# Patient Record
Sex: FEMALE | Race: WHITE | Employment: OTHER | URBAN - METROPOLITAN AREA
[De-identification: names, ages, dates, MRNs, and addresses within clinical notes are randomized per-mention and may not be internally consistent; named-entity substitution may affect disease eponyms.]

---

## 2019-07-03 ASSESSMENT — ENCOUNTER SYMPTOMS
SORE THROAT: 0
CONSTIPATION: 0
DIARRHEA: 0
BLOOD IN STOOL: 0
SINUS PAIN: 0
COUGH: 0
NAUSEA: 0
BACK PAIN: 0
ABDOMINAL PAIN: 0
WHEEZING: 0
SHORTNESS OF BREATH: 0
VOMITING: 0

## 2019-07-03 NOTE — PROGRESS NOTES
Constitutional: She is oriented to person, place, and time. She appears well-developed and well-nourished. No distress. HENT:   Head: Normocephalic and atraumatic. Right Ear: External ear normal.   Left Ear: External ear normal.   Mouth/Throat: Oropharynx is clear and moist. No oropharyngeal exudate. Eyes: Pupils are equal, round, and reactive to light. Conjunctivae are normal.   Neck: Normal range of motion. Neck supple. No thyromegaly present. Cardiovascular: Normal rate, regular rhythm, normal heart sounds and intact distal pulses. Exam reveals no gallop and no friction rub. No murmur heard. Pulmonary/Chest: Effort normal and breath sounds normal. No respiratory distress. She has no wheezes. She has no rales. She exhibits no tenderness. Abdominal: Soft. Bowel sounds are normal. She exhibits no distension. There is no tenderness. There is no guarding. Musculoskeletal: Normal range of motion. She exhibits no edema, tenderness or deformity. Lymphadenopathy:     She has no cervical adenopathy. Neurological: She is alert and oriented to person, place, and time. Coordination normal.   Skin: Skin is warm and dry. No rash noted. She is not diaphoretic. No erythema. No pallor. Psychiatric: She has a normal mood and affect. Her behavior is normal. Judgment and thought content normal.   Vitals reviewed. Assessment and Plan:  Avery Melo was seen today for establish care. Diagnoses and all orders for this visit:    Encounter to establish care with new doctor    Anxiety and depression/PTSD (post-traumatic stress disorder)   - The patient is very poor historian. She is unsure of what medications she is taking and what doses she is taking. She did not bring her medications with her. She is unsure of the dose of Zoloft at this time.   - PHQ9 today is 3.   - The patient was seeing a counselor in her old city and she would like to establish with one today.    - Referral to psychology    Type 2 diabetes

## 2019-07-10 ENCOUNTER — OFFICE VISIT (OUTPATIENT)
Dept: INTERNAL MEDICINE CLINIC | Age: 64
End: 2019-07-10
Payer: MEDICAID

## 2019-07-10 VITALS
BODY MASS INDEX: 22.12 KG/M2 | DIASTOLIC BLOOD PRESSURE: 78 MMHG | SYSTOLIC BLOOD PRESSURE: 130 MMHG | HEART RATE: 90 BPM | HEIGHT: 71 IN | OXYGEN SATURATION: 98 % | WEIGHT: 158 LBS

## 2019-07-10 DIAGNOSIS — F43.10 PTSD (POST-TRAUMATIC STRESS DISORDER): ICD-10-CM

## 2019-07-10 DIAGNOSIS — Z11.59 NEED FOR HEPATITIS C SCREENING TEST: ICD-10-CM

## 2019-07-10 DIAGNOSIS — Z11.4 SCREENING FOR HIV (HUMAN IMMUNODEFICIENCY VIRUS): ICD-10-CM

## 2019-07-10 DIAGNOSIS — I10 ESSENTIAL HYPERTENSION: ICD-10-CM

## 2019-07-10 DIAGNOSIS — Z79.4 TYPE 2 DIABETES MELLITUS WITHOUT COMPLICATION, WITH LONG-TERM CURRENT USE OF INSULIN (HCC): ICD-10-CM

## 2019-07-10 DIAGNOSIS — F32.A ANXIETY AND DEPRESSION: ICD-10-CM

## 2019-07-10 DIAGNOSIS — Z12.39 SCREENING FOR BREAST CANCER: ICD-10-CM

## 2019-07-10 DIAGNOSIS — F41.9 ANXIETY AND DEPRESSION: ICD-10-CM

## 2019-07-10 DIAGNOSIS — E11.9 TYPE 2 DIABETES MELLITUS WITHOUT COMPLICATION, WITH LONG-TERM CURRENT USE OF INSULIN (HCC): ICD-10-CM

## 2019-07-10 DIAGNOSIS — Z76.89 ENCOUNTER TO ESTABLISH CARE WITH NEW DOCTOR: Primary | ICD-10-CM

## 2019-07-10 DIAGNOSIS — A60.00 GENITAL HERPES SIMPLEX, UNSPECIFIED SITE: ICD-10-CM

## 2019-07-10 PROCEDURE — 96160 PT-FOCUSED HLTH RISK ASSMT: CPT | Performed by: NURSE PRACTITIONER

## 2019-07-10 PROCEDURE — G8420 CALC BMI NORM PARAMETERS: HCPCS | Performed by: NURSE PRACTITIONER

## 2019-07-10 PROCEDURE — 1036F TOBACCO NON-USER: CPT | Performed by: NURSE PRACTITIONER

## 2019-07-10 PROCEDURE — 99204 OFFICE O/P NEW MOD 45 MIN: CPT | Performed by: NURSE PRACTITIONER

## 2019-07-10 PROCEDURE — 3017F COLORECTAL CA SCREEN DOC REV: CPT | Performed by: NURSE PRACTITIONER

## 2019-07-10 PROCEDURE — 3046F HEMOGLOBIN A1C LEVEL >9.0%: CPT | Performed by: NURSE PRACTITIONER

## 2019-07-10 PROCEDURE — G8427 DOCREV CUR MEDS BY ELIG CLIN: HCPCS | Performed by: NURSE PRACTITIONER

## 2019-07-10 PROCEDURE — 2022F DILAT RTA XM EVC RTNOPTHY: CPT | Performed by: NURSE PRACTITIONER

## 2019-07-10 RX ORDER — CHOLECALCIFEROL (VITAMIN D3) 25 MCG
1000 CAPSULE ORAL DAILY
Qty: 30 CAPSULE | Refills: 0 | Status: CANCELLED | OUTPATIENT
Start: 2019-07-10

## 2019-07-10 RX ORDER — SERTRALINE HYDROCHLORIDE 100 MG/1
100 TABLET, FILM COATED ORAL DAILY
Refills: 11 | COMMUNITY
Start: 2019-04-29 | End: 2019-07-18 | Stop reason: SDUPTHER

## 2019-07-10 RX ORDER — CYCLOBENZAPRINE HCL 5 MG
5 TABLET ORAL 3 TIMES DAILY
Refills: 0 | COMMUNITY
Start: 2019-05-10 | End: 2019-07-18 | Stop reason: SDUPTHER

## 2019-07-10 RX ORDER — CHOLECALCIFEROL (VITAMIN D3) 25 MCG
1000 CAPSULE ORAL DAILY
Refills: 90 | COMMUNITY
Start: 2019-04-29 | End: 2019-07-18

## 2019-07-10 RX ORDER — SERTRALINE HYDROCHLORIDE 100 MG/1
50 TABLET, FILM COATED ORAL 2 TIMES DAILY
Qty: 30 TABLET | Refills: 0 | Status: CANCELLED | OUTPATIENT
Start: 2019-07-10

## 2019-07-10 RX ORDER — CYCLOBENZAPRINE HCL 5 MG
5 TABLET ORAL 3 TIMES DAILY
Qty: 90 TABLET | Refills: 0 | Status: CANCELLED | OUTPATIENT
Start: 2019-07-10

## 2019-07-10 RX ORDER — ACYCLOVIR 200 MG/1
400 CAPSULE ORAL 2 TIMES DAILY
Qty: 60 CAPSULE | Refills: 0 | Status: CANCELLED | OUTPATIENT
Start: 2019-07-10

## 2019-07-10 RX ORDER — AMITRIPTYLINE HYDROCHLORIDE 100 MG/1
100 TABLET, FILM COATED ORAL NIGHTLY
COMMUNITY
Start: 2019-05-01 | End: 2019-07-18 | Stop reason: SDUPTHER

## 2019-07-10 RX ORDER — ACYCLOVIR 200 MG/1
400 CAPSULE ORAL 2 TIMES DAILY
Refills: 0 | COMMUNITY
Start: 2019-06-17 | End: 2019-07-18 | Stop reason: SDUPTHER

## 2019-07-10 RX ORDER — AMITRIPTYLINE HYDROCHLORIDE 100 MG/1
100 TABLET, FILM COATED ORAL NIGHTLY
Qty: 30 TABLET | Refills: 0 | Status: CANCELLED | OUTPATIENT
Start: 2019-07-10

## 2019-07-10 SDOH — HEALTH STABILITY: MENTAL HEALTH: HOW OFTEN DO YOU HAVE A DRINK CONTAINING ALCOHOL?: NEVER

## 2019-07-10 ASSESSMENT — PATIENT HEALTH QUESTIONNAIRE - PHQ9
SUM OF ALL RESPONSES TO PHQ QUESTIONS 1-9: 3
SUM OF ALL RESPONSES TO PHQ QUESTIONS 1-9: 3
SUM OF ALL RESPONSES TO PHQ9 QUESTIONS 1 & 2: 1
6. FEELING BAD ABOUT YOURSELF - OR THAT YOU ARE A FAILURE OR HAVE LET YOURSELF OR YOUR FAMILY DOWN: 0
4. FEELING TIRED OR HAVING LITTLE ENERGY: 1
7. TROUBLE CONCENTRATING ON THINGS, SUCH AS READING THE NEWSPAPER OR WATCHING TELEVISION: 0
3. TROUBLE FALLING OR STAYING ASLEEP: 1
8. MOVING OR SPEAKING SO SLOWLY THAT OTHER PEOPLE COULD HAVE NOTICED. OR THE OPPOSITE, BEING SO FIGETY OR RESTLESS THAT YOU HAVE BEEN MOVING AROUND A LOT MORE THAN USUAL: 0
5. POOR APPETITE OR OVEREATING: 0
10. IF YOU CHECKED OFF ANY PROBLEMS, HOW DIFFICULT HAVE THESE PROBLEMS MADE IT FOR YOU TO DO YOUR WORK, TAKE CARE OF THINGS AT HOME, OR GET ALONG WITH OTHER PEOPLE: 0
9. THOUGHTS THAT YOU WOULD BE BETTER OFF DEAD, OR OF HURTING YOURSELF: 0
1. LITTLE INTEREST OR PLEASURE IN DOING THINGS: 1
2. FEELING DOWN, DEPRESSED OR HOPELESS: 0

## 2019-07-15 ASSESSMENT — ENCOUNTER SYMPTOMS
DIARRHEA: 0
ABDOMINAL PAIN: 0
SHORTNESS OF BREATH: 0
NAUSEA: 0
WHEEZING: 0
CONSTIPATION: 0
COUGH: 0
VOMITING: 0

## 2019-07-15 NOTE — PROGRESS NOTES
Office Visit  7/18/2019    Subjective:  Chief Complaint   Patient presents with    Medication Check     1 week med. check     Shoulder Pain     R. shoulder no improvement      HPI:   Farida Pisano is a 60 y.o. female who presents to the clinic today for medication management. The pt established care last visit and forgot her medications and was unable to provide information on her PMH and medications. She brought them this visit for evaluation. T1DM- Pt takes Lantus 50 units nightly and Humalog 15 units SQ 3 times daily with sliding scale. She follows with endocrine - Dr. Barrington Joyce. Prescribed losartan 25 mg PO daily- she states that she would like to check her microalbumin before taking this. This was already ordered. Reviewed that if another specialist prescribed this medication, she should take as recommended. Pt agreeable. HTN- Pt reports that she takes losartan for kidneys. No BP medications for BP per pt. BP well controlled. Asymptomatic. No chest pain, palpitations, shortness of breath, trouble breathing, lightheadedness, dizziness or blurred vision. Depression/PTSD/Anxiety-Pt reports her  tried to commit suicide multiple times. Her  was placed in a facility and his family took him and now she is unable to see him. She reports that her mother was in hospice in the pt's home. Life stressors. Pt is taking Zoloft 100 mg daily and amitriptyline 100 mg nightly. She reports her mood is to baseline and she is much better on these medications. Did not schedule with psychology- scheduling out of this office d/t insurance. Genital herpes- Pt reports that she takes acyclovir 400 mg once nightly- prescribed for BID. She states she increases the dose with a flare, which she has not experienced. Vit D deficiency- Pt reports that she takes these daily. Right shoulder pains- She also reports chronic shoulder pains that have been present for years. No injury/trauma. she ran a  distal pulses. Exam reveals no gallop and no friction rub. No murmur heard. Pulmonary/Chest: Effort normal and breath sounds normal. No stridor. No respiratory distress. She has no wheezes. She has no rales. She exhibits no tenderness. Abdominal: Soft. Bowel sounds are normal.   Musculoskeletal:   Drop arm test negative. Empty can test negative. Pain with palpation of anterior right shoulder. Pt refusing right sided adduction and internal rotation d/t pain. No redness, swelling, heat or rash. Neurological: She is alert and oriented to person, place, and time. Coordination normal.   Diabetic foot exam: 2+ DP and PT pulses. Sensation intact to monofilament testing. No callous or ulcers visualized; toenails are normal in appearance, proprioception intact in the bilateral feet   Skin: Skin is warm and dry. No rash noted. She is not diaphoretic. No erythema. No pallor. Psychiatric: She has a normal mood and affect. Her behavior is normal. Judgment and thought content normal.   Vitals reviewed. Assessment and Plan:  Avery Melo was seen today for medication check and shoulder pain. Diagnoses and all orders for this visit:    Type 1 diabetes mellitus without complication, with long-term current use of insulin ([address]  -     Continue with endocrine.   - Continue current regimen as prescribed by endocrine.  - Lifestyle modifications such as exercise, weight loss and healthy diet encouraged and reviewed with the pt. - Diabetic Foot Exam- see physical exam    Vitamin D deficiency  -    Takes supplements daily. Will re-evaluate labs today   - VITAMIN D 25 HYDROXY; Future    Genital herpes simplex, unspecified site  -    Taking acyclovir to prevent outbreaks. Recommended the patient take medication as prescribed. No side effects. Denies flares at this time. - acyclovir (ZOVIRAX) 200 MG capsule; Take 2 capsules by mouth 2 times daily- refilled today    Chronic right shoulder pain  -    Pain for years.   No injury or

## 2019-07-18 ENCOUNTER — OFFICE VISIT (OUTPATIENT)
Dept: INTERNAL MEDICINE CLINIC | Age: 64
End: 2019-07-18
Payer: MEDICAID

## 2019-07-18 VITALS
BODY MASS INDEX: 22.26 KG/M2 | WEIGHT: 159 LBS | OXYGEN SATURATION: 98 % | SYSTOLIC BLOOD PRESSURE: 120 MMHG | HEART RATE: 82 BPM | HEIGHT: 71 IN | DIASTOLIC BLOOD PRESSURE: 64 MMHG

## 2019-07-18 DIAGNOSIS — I10 ESSENTIAL HYPERTENSION: ICD-10-CM

## 2019-07-18 DIAGNOSIS — E55.9 VITAMIN D DEFICIENCY: ICD-10-CM

## 2019-07-18 DIAGNOSIS — G89.29 CHRONIC RIGHT SHOULDER PAIN: ICD-10-CM

## 2019-07-18 DIAGNOSIS — F43.10 PTSD (POST-TRAUMATIC STRESS DISORDER): ICD-10-CM

## 2019-07-18 DIAGNOSIS — F32.A ANXIETY AND DEPRESSION: ICD-10-CM

## 2019-07-18 DIAGNOSIS — E10.9 TYPE 1 DIABETES MELLITUS WITHOUT COMPLICATION (HCC): Primary | ICD-10-CM

## 2019-07-18 DIAGNOSIS — Z11.4 SCREENING FOR HIV (HUMAN IMMUNODEFICIENCY VIRUS): ICD-10-CM

## 2019-07-18 DIAGNOSIS — Z11.59 NEED FOR HEPATITIS C SCREENING TEST: ICD-10-CM

## 2019-07-18 DIAGNOSIS — E11.9 TYPE 2 DIABETES MELLITUS WITHOUT COMPLICATION, WITH LONG-TERM CURRENT USE OF INSULIN (HCC): ICD-10-CM

## 2019-07-18 DIAGNOSIS — A60.00 GENITAL HERPES SIMPLEX, UNSPECIFIED SITE: ICD-10-CM

## 2019-07-18 DIAGNOSIS — M25.511 CHRONIC RIGHT SHOULDER PAIN: ICD-10-CM

## 2019-07-18 DIAGNOSIS — F41.9 ANXIETY AND DEPRESSION: ICD-10-CM

## 2019-07-18 DIAGNOSIS — Z79.4 TYPE 2 DIABETES MELLITUS WITHOUT COMPLICATION, WITH LONG-TERM CURRENT USE OF INSULIN (HCC): ICD-10-CM

## 2019-07-18 LAB
A/G RATIO: 2.2 (ref 1.1–2.2)
ALBUMIN SERPL-MCNC: 4.3 G/DL (ref 3.4–5)
ALP BLD-CCNC: 105 U/L (ref 40–129)
ALT SERPL-CCNC: 18 U/L (ref 10–40)
ANION GAP SERPL CALCULATED.3IONS-SCNC: 11 MMOL/L (ref 3–16)
AST SERPL-CCNC: 25 U/L (ref 15–37)
BASOPHILS ABSOLUTE: 0.1 K/UL (ref 0–0.2)
BASOPHILS RELATIVE PERCENT: 1.2 %
BILIRUB SERPL-MCNC: 0.3 MG/DL (ref 0–1)
BUN BLDV-MCNC: 11 MG/DL (ref 7–20)
CALCIUM SERPL-MCNC: 10 MG/DL (ref 8.3–10.6)
CHLORIDE BLD-SCNC: 103 MMOL/L (ref 99–110)
CHOLESTEROL, FASTING: 212 MG/DL (ref 0–199)
CO2: 29 MMOL/L (ref 21–32)
CREAT SERPL-MCNC: 0.6 MG/DL (ref 0.6–1.2)
CREATININE URINE: 49.2 MG/DL (ref 28–259)
EOSINOPHILS ABSOLUTE: 0.2 K/UL (ref 0–0.6)
EOSINOPHILS RELATIVE PERCENT: 2.5 %
GFR AFRICAN AMERICAN: >60
GFR NON-AFRICAN AMERICAN: >60
GLOBULIN: 2 G/DL
GLUCOSE BLD-MCNC: 163 MG/DL (ref 70–99)
HCT VFR BLD CALC: 39.9 % (ref 36–48)
HDLC SERPL-MCNC: 58 MG/DL (ref 40–60)
HEMOGLOBIN: 13.2 G/DL (ref 12–16)
HEPATITIS C ANTIBODY INTERPRETATION: NORMAL
LDL CHOLESTEROL CALCULATED: 131 MG/DL
LYMPHOCYTES ABSOLUTE: 2.4 K/UL (ref 1–5.1)
LYMPHOCYTES RELATIVE PERCENT: 32.5 %
MCH RBC QN AUTO: 31.2 PG (ref 26–34)
MCHC RBC AUTO-ENTMCNC: 33.1 G/DL (ref 31–36)
MCV RBC AUTO: 94.2 FL (ref 80–100)
MICROALBUMIN UR-MCNC: <1.2 MG/DL
MICROALBUMIN/CREAT UR-RTO: NORMAL MG/G (ref 0–30)
MONOCYTES ABSOLUTE: 0.5 K/UL (ref 0–1.3)
MONOCYTES RELATIVE PERCENT: 6.3 %
NEUTROPHILS ABSOLUTE: 4.2 K/UL (ref 1.7–7.7)
NEUTROPHILS RELATIVE PERCENT: 57.5 %
PDW BLD-RTO: 13.3 % (ref 12.4–15.4)
PLATELET # BLD: 258 K/UL (ref 135–450)
PMV BLD AUTO: 8.6 FL (ref 5–10.5)
POTASSIUM SERPL-SCNC: 4.4 MMOL/L (ref 3.5–5.1)
RBC # BLD: 4.23 M/UL (ref 4–5.2)
SODIUM BLD-SCNC: 143 MMOL/L (ref 136–145)
TOTAL PROTEIN: 6.3 G/DL (ref 6.4–8.2)
TRIGLYCERIDE, FASTING: 117 MG/DL (ref 0–150)
VITAMIN D 25-HYDROXY: 23.9 NG/ML
VLDLC SERPL CALC-MCNC: 23 MG/DL
WBC # BLD: 7.3 K/UL (ref 4–11)

## 2019-07-18 PROCEDURE — 2022F DILAT RTA XM EVC RTNOPTHY: CPT | Performed by: NURSE PRACTITIONER

## 2019-07-18 PROCEDURE — G8427 DOCREV CUR MEDS BY ELIG CLIN: HCPCS | Performed by: NURSE PRACTITIONER

## 2019-07-18 PROCEDURE — 3017F COLORECTAL CA SCREEN DOC REV: CPT | Performed by: NURSE PRACTITIONER

## 2019-07-18 PROCEDURE — 1036F TOBACCO NON-USER: CPT | Performed by: NURSE PRACTITIONER

## 2019-07-18 PROCEDURE — 3046F HEMOGLOBIN A1C LEVEL >9.0%: CPT | Performed by: NURSE PRACTITIONER

## 2019-07-18 PROCEDURE — G8420 CALC BMI NORM PARAMETERS: HCPCS | Performed by: NURSE PRACTITIONER

## 2019-07-18 PROCEDURE — 99214 OFFICE O/P EST MOD 30 MIN: CPT | Performed by: NURSE PRACTITIONER

## 2019-07-18 RX ORDER — FLUTICASONE PROPIONATE 50 MCG
1 SPRAY, SUSPENSION (ML) NASAL DAILY
COMMUNITY
Start: 2018-12-29 | End: 2020-02-06 | Stop reason: SDUPTHER

## 2019-07-18 RX ORDER — CYCLOBENZAPRINE HCL 5 MG
5 TABLET ORAL NIGHTLY
Qty: 90 TABLET | Refills: 0 | Status: SHIPPED | OUTPATIENT
Start: 2019-07-18 | End: 2020-01-21 | Stop reason: SDUPTHER

## 2019-07-18 RX ORDER — INSULIN GLARGINE 100 [IU]/ML
50 INJECTION, SOLUTION SUBCUTANEOUS EVERY MORNING
COMMUNITY

## 2019-07-18 RX ORDER — BRIMONIDINE TARTRATE 2 MG/ML
1 SOLUTION/ DROPS OPHTHALMIC EVERY 12 HOURS
COMMUNITY
End: 2021-11-09

## 2019-07-18 RX ORDER — LOSARTAN POTASSIUM 25 MG/1
25 TABLET ORAL DAILY
COMMUNITY
End: 2019-09-18

## 2019-07-18 RX ORDER — AMITRIPTYLINE HYDROCHLORIDE 100 MG/1
100 TABLET, FILM COATED ORAL NIGHTLY
Qty: 90 TABLET | Refills: 0 | Status: SHIPPED | OUTPATIENT
Start: 2019-07-18 | End: 2019-11-07 | Stop reason: SDUPTHER

## 2019-07-18 RX ORDER — ACYCLOVIR 200 MG/1
400 CAPSULE ORAL 2 TIMES DAILY
Qty: 120 CAPSULE | Refills: 1 | Status: SHIPPED | OUTPATIENT
Start: 2019-07-18 | End: 2019-11-07 | Stop reason: SDUPTHER

## 2019-07-18 RX ORDER — SERTRALINE HYDROCHLORIDE 100 MG/1
100 TABLET, FILM COATED ORAL DAILY
Qty: 90 TABLET | Refills: 0 | Status: SHIPPED | OUTPATIENT
Start: 2019-07-18 | End: 2019-09-25 | Stop reason: SDUPTHER

## 2019-07-18 RX ORDER — PREDNISOLONE ACETATE 10 MG/ML
4 SUSPENSION/ DROPS OPHTHALMIC 4 TIMES DAILY
COMMUNITY
Start: 2019-01-09 | End: 2021-11-09

## 2019-07-19 LAB
ESTIMATED AVERAGE GLUCOSE: 174.3 MG/DL
HBA1C MFR BLD: 7.7 %
HIV AG/AB: NORMAL
HIV ANTIGEN: NORMAL
HIV-1 ANTIBODY: NORMAL
HIV-2 AB: NORMAL

## 2019-07-30 ENCOUNTER — HOSPITAL ENCOUNTER (OUTPATIENT)
Dept: MAMMOGRAPHY | Age: 64
Discharge: HOME | End: 2019-07-30
Payer: MEDICAID

## 2019-07-30 DIAGNOSIS — Z12.39 SCREENING FOR BREAST CANCER: ICD-10-CM

## 2019-07-30 PROCEDURE — 77067 SCR MAMMO BI INCL CAD: CPT

## 2019-07-31 ENCOUNTER — TELEPHONE (OUTPATIENT)
Dept: INTERNAL MEDICINE CLINIC | Age: 64
End: 2019-07-31

## 2019-07-31 NOTE — LETTER
[address] [place] [address] INTERNAL MEDICINE  [address] [place] [address]  Dept: [phone]  Dept Fax: [phone] Ze Cano, APRN - CNP   7/31/2019        Dear John Caro ,    Our records show that you are due or overdue for a colon cancer screening. Colorectal cancer is the second leading cause of cancer-related death in the [facility]. The good news is that this disease can be prevented. Colon Cancer screening can detect precancerous polyps that can be removed with easy procedures. The U.S Preventative Services Task Force tells us that appropriately scheduled colon cancer screening reduces death from colorectal cancer, and strongly suggests screening for men and women ages 48 and older. I recommend that you elect one of the following two approved options:         1) A test that finds polyps and cancer - colonoscopy       2) A test that primarily finds cancer - yearly stool testing, with a colonoscopy for                    positive tests showing presence of blood in the bowels (FOBT or FIT)    The colonoscopy is preferable as it is able to identify and treat both pre-cancerous polyps and early forms of colon cancer. Please call our office at [phone] so we can assist you in scheduling your colonoscopy. We look forward to hearing from you. If you already have had this done, we praise your attention to your health. Please call our office so we can update your records. Please make sure to let us know who performed your test and where it was done. We also welcome you to reach out to us online using [employer]. Ask us how!     Thank you!      www.cdc.gov

## 2019-08-06 DIAGNOSIS — R92.8 ABNORMAL MAMMOGRAM: Primary | ICD-10-CM

## 2019-08-26 ENCOUNTER — HOSPITAL ENCOUNTER (OUTPATIENT)
Dept: WOMENS IMAGING | Age: 64
Discharge: HOME | End: 2019-08-26
Payer: MEDICAID

## 2019-08-26 ENCOUNTER — HOSPITAL ENCOUNTER (OUTPATIENT)
Dept: ULTRASOUND IMAGING | Age: 64
Discharge: HOME | End: 2019-08-26
Payer: MEDICAID

## 2019-08-26 DIAGNOSIS — R92.8 ABNORMAL FINDING ON MAMMOGRAPHY: ICD-10-CM

## 2019-08-26 PROCEDURE — 76642 ULTRASOUND BREAST LIMITED: CPT

## 2019-08-26 PROCEDURE — G0279 TOMOSYNTHESIS, MAMMO: HCPCS

## 2019-08-27 DIAGNOSIS — R92.8 ABNORMAL MAMMOGRAM: Primary | ICD-10-CM

## 2019-09-18 ENCOUNTER — OFFICE VISIT (OUTPATIENT)
Dept: INTERNAL MEDICINE CLINIC | Age: 64
End: 2019-09-18
Payer: MEDICAID

## 2019-09-18 VITALS
TEMPERATURE: 98.5 F | HEART RATE: 95 BPM | BODY MASS INDEX: 21.9 KG/M2 | OXYGEN SATURATION: 98 % | DIASTOLIC BLOOD PRESSURE: 72 MMHG | WEIGHT: 157 LBS | SYSTOLIC BLOOD PRESSURE: 124 MMHG

## 2019-09-18 DIAGNOSIS — R42 DIZZINESS: ICD-10-CM

## 2019-09-18 DIAGNOSIS — R42 DIZZINESS: Primary | ICD-10-CM

## 2019-09-18 LAB
A/G RATIO: 2 (ref 1.1–2.2)
ALBUMIN SERPL-MCNC: 4.3 G/DL (ref 3.4–5)
ALP BLD-CCNC: 104 U/L (ref 40–129)
ALT SERPL-CCNC: 10 U/L (ref 10–40)
ANION GAP SERPL CALCULATED.3IONS-SCNC: 15 MMOL/L (ref 3–16)
AST SERPL-CCNC: 15 U/L (ref 15–37)
BASOPHILS ABSOLUTE: 0.1 K/UL (ref 0–0.2)
BASOPHILS RELATIVE PERCENT: 1.2 %
BILIRUB SERPL-MCNC: 0.4 MG/DL (ref 0–1)
BUN BLDV-MCNC: 11 MG/DL (ref 7–20)
CALCIUM SERPL-MCNC: 10 MG/DL (ref 8.3–10.6)
CHLORIDE BLD-SCNC: 98 MMOL/L (ref 99–110)
CO2: 25 MMOL/L (ref 21–32)
CREAT SERPL-MCNC: 0.6 MG/DL (ref 0.6–1.2)
EOSINOPHILS ABSOLUTE: 0.2 K/UL (ref 0–0.6)
EOSINOPHILS RELATIVE PERCENT: 2.8 %
GFR AFRICAN AMERICAN: >60
GFR NON-AFRICAN AMERICAN: >60
GLOBULIN: 2.2 G/DL
GLUCOSE BLD-MCNC: 230 MG/DL (ref 70–99)
HCT VFR BLD CALC: 40.9 % (ref 36–48)
HEMOGLOBIN: 13.6 G/DL (ref 12–16)
LYMPHOCYTES ABSOLUTE: 1.6 K/UL (ref 1–5.1)
LYMPHOCYTES RELATIVE PERCENT: 23.3 %
MCH RBC QN AUTO: 31.3 PG (ref 26–34)
MCHC RBC AUTO-ENTMCNC: 33.4 G/DL (ref 31–36)
MCV RBC AUTO: 94 FL (ref 80–100)
MONOCYTES ABSOLUTE: 0.5 K/UL (ref 0–1.3)
MONOCYTES RELATIVE PERCENT: 7 %
NEUTROPHILS ABSOLUTE: 4.4 K/UL (ref 1.7–7.7)
NEUTROPHILS RELATIVE PERCENT: 65.7 %
PDW BLD-RTO: 14.1 % (ref 12.4–15.4)
PLATELET # BLD: 263 K/UL (ref 135–450)
PMV BLD AUTO: 8.3 FL (ref 5–10.5)
POTASSIUM SERPL-SCNC: 4.4 MMOL/L (ref 3.5–5.1)
RBC # BLD: 4.35 M/UL (ref 4–5.2)
SODIUM BLD-SCNC: 138 MMOL/L (ref 136–145)
TOTAL PROTEIN: 6.5 G/DL (ref 6.4–8.2)
TSH REFLEX FT4: 2.12 UIU/ML (ref 0.27–4.2)
WBC # BLD: 6.8 K/UL (ref 4–11)

## 2019-09-18 PROCEDURE — G8427 DOCREV CUR MEDS BY ELIG CLIN: HCPCS | Performed by: NURSE PRACTITIONER

## 2019-09-18 PROCEDURE — 99213 OFFICE O/P EST LOW 20 MIN: CPT | Performed by: NURSE PRACTITIONER

## 2019-09-18 PROCEDURE — 1036F TOBACCO NON-USER: CPT | Performed by: NURSE PRACTITIONER

## 2019-09-18 PROCEDURE — G8420 CALC BMI NORM PARAMETERS: HCPCS | Performed by: NURSE PRACTITIONER

## 2019-09-18 PROCEDURE — 3017F COLORECTAL CA SCREEN DOC REV: CPT | Performed by: NURSE PRACTITIONER

## 2019-09-18 RX ORDER — MECLIZINE HCL 12.5 MG/1
12.5 TABLET ORAL 3 TIMES DAILY PRN
Qty: 15 TABLET | Refills: 0 | Status: CANCELLED | OUTPATIENT
Start: 2019-09-18 | End: 2019-09-28

## 2019-09-18 ASSESSMENT — ENCOUNTER SYMPTOMS
CONSTIPATION: 0
SHORTNESS OF BREATH: 0
WHEEZING: 0
ABDOMINAL PAIN: 0
NAUSEA: 0
DIARRHEA: 0
VOMITING: 0
COUGH: 0

## 2019-09-18 NOTE — PROGRESS NOTES
(ELAVIL) 100 MG tablet Take 1 tablet by mouth nightly 90 tablet 0    acyclovir (ZOVIRAX) 200 MG capsule Take 2 capsules by mouth 2 times daily 120 capsule 1    cyclobenzaprine (FLEXERIL) 5 MG tablet Take 1 tablet by mouth nightly 90 tablet 0    insulin lispro (HUMALOG) 100 UNIT/ML injection vial Inject 15 Units into the skin 3 times daily (before meals) Indications: with sliding scale Sliding scal   <200 = 0 units   221-250 = 1 unit  251-300 = 2 units   301 - 350 = 3 units   351-400= 4 units   401-430 = 5 units       No current facility-administered medications for this visit. Review of Systems   Constitutional: Negative for chills, fatigue, fever and unexpected weight change. Eyes: Negative for visual disturbance. Respiratory: Negative for cough, shortness of breath and wheezing. Cardiovascular: Negative for chest pain, palpitations and leg swelling. Gastrointestinal: Negative for abdominal pain, constipation, diarrhea, nausea and vomiting. Skin: Negative for pallor and rash. Neurological: Positive for dizziness and light-headedness. Negative for tremors, seizures, syncope, facial asymmetry, speech difficulty, weakness, numbness and headaches. OBJECTIVE:  /72 (Site: Left Upper Arm, Position: Supine)   Pulse 95   Temp 98.5 °F (36.9 °C) (Temporal)   Wt 157 lb (71.2 kg)   SpO2 98%   BMI 21.90 kg/m²    Physical Exam   Constitutional: She is oriented to person, place, and time. She appears well-developed and well-nourished. No distress. HENT:   Head: Normocephalic and atraumatic. Eyes: Pupils are equal, round, and reactive to light. Neck: Normal range of motion. Cardiovascular: Normal rate, regular rhythm, normal heart sounds and intact distal pulses. Exam reveals no gallop and no friction rub. No murmur heard. Pulmonary/Chest: Effort normal and breath sounds normal. No respiratory distress. She has no wheezes. She has no rales. She exhibits no tenderness.    Abdominal:

## 2019-09-18 NOTE — PATIENT INSTRUCTIONS
Labs today- I will call you with your results  Epley maneuver - perform exercises  Increase fluid water intake      Patient Education   Epley Maneuver at Home for Vertigo: Exercises  Introduction  Vertigo is a spinning or whirling sensation when you move your head. Your doctor may have moved you in different positions to help your vertigo get better faster. This is called the Epley maneuver. Your doctor also may have asked you to do these exercises at home. Do the exercises as often as your doctor recommends. If your vertigo is getting worse, your doctor may have you change the exercise or stop it. Step 1  Step 1    1. Sit on the edge of a bed or sofa. Step 2    1. Turn your head 45 degrees in the direction your doctor told you to. This should be toward the ear that causes the most vertigo for you. In this picture, the woman is turning toward her left ear. Step 3    1. Tilt yourself backward until you are lying on your back. Your head should still be at a 45-degree turn. Your head should be about midway between looking straight ahead and looking out to your side. Hold for 30 seconds. If you have vertigo, stay in this position until it stops. Step 4    1. Turn your head 90 degrees toward the ear that has the least vertigo. In this picture, the woman is turning to the right because she has vertigo on her left side. The point of your chin should be raised and over your shoulder. Hold for 30 seconds. Step 5    1. Roll onto the side with the least vertigo. You should now be looking at the floor. Hold for 30 seconds. Follow-up care is a key part of your treatment and safety. Be sure to make and go to all appointments, and call your doctor if you are having problems. It's also a good idea to know your test results and keep a list of the medicines you take. Where can you learn more? Go to [email or web address] and sign in to your [employer] account.  Enter F242 in the [address]

## 2019-09-20 ENCOUNTER — TELEPHONE (OUTPATIENT)
Dept: INTERNAL MEDICINE CLINIC | Age: 64
End: 2019-09-20

## 2019-09-23 ASSESSMENT — ENCOUNTER SYMPTOMS
NAUSEA: 0
CONSTIPATION: 0
VOMITING: 0
WHEEZING: 0
DIARRHEA: 0
SHORTNESS OF BREATH: 0
ABDOMINAL PAIN: 0
COUGH: 0

## 2019-09-23 NOTE — PROGRESS NOTES
pulses. Exam reveals no gallop and no friction rub. No murmur heard. Pulmonary/Chest: Effort normal and breath sounds normal. No respiratory distress. She has no wheezes. She has no rales. She exhibits no tenderness. Abdominal: Soft. Bowel sounds are normal.   Neurological: She is alert and oriented to person, place, and time. No sensory deficit. She exhibits normal muscle tone. Coordination normal.   Skin: Skin is warm and dry. No rash noted. She is not diaphoretic. No erythema. No pallor. Psychiatric: She has a normal mood and affect. Her behavior is normal. Judgment and thought content normal.   Vitals reviewed. Assessment and Plan:  Meliton Coyne was seen today for dizziness. Diagnoses and all orders for this visit:    Dizziness  -    The patient reports that the dizziness is becoming less frequent, but it is more severe when they occur. The patient reports that she took her friend's Antivert with relief. She would like a prescription for this. No dizziness at this time. Safety reviewed. Encouraged an increase in fluid water intake. - Labs reviewed with the patient. Vertigo reviewed with the patient. Patient education handout on vertigo provided and reviewed with the patient. The patient did not perform the Epley maneuvers as recommended. I recommended that the patient perform the Epley maneuver and patient education handout was provided and reviewed with the patient today. - meclizine (ANTIVERT) 12.5 MG tablet; Take 1 tablet by mouth 3 times daily as needed for Dizziness - patient education handout provided and reviewed with the pt. - The patient will call if symptoms worsen or fail to improve. - Red flag findings reviewed with the patient and she will call if these occur    Return for as previously scheduled or sooner if needed. Pt will call if symptoms worsen or fail to improve. All questions answered. Pt states no further questions or concerns at this time.    Electronically signed by: Franci Jorgensen 09/25/19

## 2019-09-25 ENCOUNTER — OFFICE VISIT (OUTPATIENT)
Dept: INTERNAL MEDICINE CLINIC | Age: 64
End: 2019-09-25
Payer: MEDICAID

## 2019-09-25 VITALS
WEIGHT: 158 LBS | BODY MASS INDEX: 22.04 KG/M2 | HEART RATE: 98 BPM | OXYGEN SATURATION: 98 % | SYSTOLIC BLOOD PRESSURE: 120 MMHG | DIASTOLIC BLOOD PRESSURE: 80 MMHG

## 2019-09-25 DIAGNOSIS — F43.10 PTSD (POST-TRAUMATIC STRESS DISORDER): ICD-10-CM

## 2019-09-25 DIAGNOSIS — R42 DIZZINESS: Primary | ICD-10-CM

## 2019-09-25 PROCEDURE — G8420 CALC BMI NORM PARAMETERS: HCPCS | Performed by: NURSE PRACTITIONER

## 2019-09-25 PROCEDURE — 3017F COLORECTAL CA SCREEN DOC REV: CPT | Performed by: NURSE PRACTITIONER

## 2019-09-25 PROCEDURE — 99213 OFFICE O/P EST LOW 20 MIN: CPT | Performed by: NURSE PRACTITIONER

## 2019-09-25 PROCEDURE — G8427 DOCREV CUR MEDS BY ELIG CLIN: HCPCS | Performed by: NURSE PRACTITIONER

## 2019-09-25 PROCEDURE — 1036F TOBACCO NON-USER: CPT | Performed by: NURSE PRACTITIONER

## 2019-09-25 RX ORDER — MECLIZINE HCL 12.5 MG/1
12.5 TABLET ORAL 3 TIMES DAILY PRN
Qty: 15 TABLET | Refills: 0 | Status: SHIPPED | OUTPATIENT
Start: 2019-09-25 | End: 2019-10-05

## 2019-09-25 NOTE — PATIENT INSTRUCTIONS
Increase fluid water intake      Patient Education   Epley Maneuver at Home for Vertigo: Exercises  Introduction  Vertigo is a spinning or whirling sensation when you move your head. Your doctor may have moved you in different positions to help your vertigo get better faster. This is called the Epley maneuver. Your doctor also may have asked you to do these exercises at home. Do the exercises as often as your doctor recommends. If your vertigo is getting worse, your doctor may have you change the exercise or stop it. Step 1  Step 1    1. Sit on the edge of a bed or sofa. Step 2    1. Turn your head 45 degrees in the direction your doctor told you to. This should be toward the ear that causes the most vertigo for you. In this picture, the woman is turning toward her left ear. Step 3    1. Tilt yourself backward until you are lying on your back. Your head should still be at a 45-degree turn. Your head should be about midway between looking straight ahead and looking out to your side. Hold for 30 seconds. If you have vertigo, stay in this position until it stops. Step 4    1. Turn your head 90 degrees toward the ear that has the least vertigo. In this picture, the woman is turning to the right because she has vertigo on her left side. The point of your chin should be raised and over your shoulder. Hold for 30 seconds. Step 5    1. Roll onto the side with the least vertigo. You should now be looking at the floor. Hold for 30 seconds. Follow-up care is a key part of your treatment and safety. Be sure to make and go to all appointments, and call your doctor if you are having problems. It's also a good idea to know your test results and keep a list of the medicines you take. Where can you learn more? Go to [email or web address] and sign in to your [employer] account. Enter Y504 in the [place] box to learn more about \"Epley Maneuver at Home for Vertigo: Exercises. \"     If

## 2019-09-26 RX ORDER — SERTRALINE HYDROCHLORIDE 100 MG/1
TABLET, FILM COATED ORAL
Qty: 90 TABLET | Refills: 0 | Status: SHIPPED | OUTPATIENT
Start: 2019-09-26 | End: 2019-11-07 | Stop reason: SDUPTHER

## 2019-11-07 ENCOUNTER — OFFICE VISIT (OUTPATIENT)
Dept: INTERNAL MEDICINE CLINIC | Age: 64
End: 2019-11-07
Payer: MEDICAID

## 2019-11-07 VITALS
BODY MASS INDEX: 21.9 KG/M2 | OXYGEN SATURATION: 98 % | WEIGHT: 157 LBS | DIASTOLIC BLOOD PRESSURE: 70 MMHG | SYSTOLIC BLOOD PRESSURE: 126 MMHG | HEART RATE: 91 BPM

## 2019-11-07 DIAGNOSIS — G89.29 CHRONIC RIGHT SHOULDER PAIN: ICD-10-CM

## 2019-11-07 DIAGNOSIS — E78.2 MIXED HYPERLIPIDEMIA: ICD-10-CM

## 2019-11-07 DIAGNOSIS — A60.00 GENITAL HERPES SIMPLEX, UNSPECIFIED SITE: ICD-10-CM

## 2019-11-07 DIAGNOSIS — F43.10 PTSD (POST-TRAUMATIC STRESS DISORDER): ICD-10-CM

## 2019-11-07 DIAGNOSIS — E10.9 TYPE 1 DIABETES MELLITUS WITHOUT COMPLICATION (HCC): Primary | ICD-10-CM

## 2019-11-07 DIAGNOSIS — Z12.11 SCREEN FOR COLON CANCER: ICD-10-CM

## 2019-11-07 DIAGNOSIS — M25.511 CHRONIC RIGHT SHOULDER PAIN: ICD-10-CM

## 2019-11-07 DIAGNOSIS — E55.9 VITAMIN D DEFICIENCY: ICD-10-CM

## 2019-11-07 DIAGNOSIS — I10 ESSENTIAL HYPERTENSION: ICD-10-CM

## 2019-11-07 PROCEDURE — G8420 CALC BMI NORM PARAMETERS: HCPCS | Performed by: NURSE PRACTITIONER

## 2019-11-07 PROCEDURE — 1036F TOBACCO NON-USER: CPT | Performed by: NURSE PRACTITIONER

## 2019-11-07 PROCEDURE — 96160 PT-FOCUSED HLTH RISK ASSMT: CPT | Performed by: NURSE PRACTITIONER

## 2019-11-07 PROCEDURE — 99214 OFFICE O/P EST MOD 30 MIN: CPT | Performed by: NURSE PRACTITIONER

## 2019-11-07 PROCEDURE — G8427 DOCREV CUR MEDS BY ELIG CLIN: HCPCS | Performed by: NURSE PRACTITIONER

## 2019-11-07 PROCEDURE — 3017F COLORECTAL CA SCREEN DOC REV: CPT | Performed by: NURSE PRACTITIONER

## 2019-11-07 PROCEDURE — G8484 FLU IMMUNIZE NO ADMIN: HCPCS | Performed by: NURSE PRACTITIONER

## 2019-11-07 PROCEDURE — 2022F DILAT RTA XM EVC RTNOPTHY: CPT | Performed by: NURSE PRACTITIONER

## 2019-11-07 RX ORDER — SERTRALINE HYDROCHLORIDE 100 MG/1
TABLET, FILM COATED ORAL
Qty: 90 TABLET | Refills: 1 | Status: SHIPPED | OUTPATIENT
Start: 2019-11-07 | End: 2020-02-06 | Stop reason: SDUPTHER

## 2019-11-07 RX ORDER — AMITRIPTYLINE HYDROCHLORIDE 100 MG/1
100 TABLET, FILM COATED ORAL NIGHTLY
Qty: 90 TABLET | Refills: 1 | Status: SHIPPED | OUTPATIENT
Start: 2019-11-07 | End: 2020-02-06 | Stop reason: SDUPTHER

## 2019-11-07 RX ORDER — ACYCLOVIR 200 MG/1
400 CAPSULE ORAL 2 TIMES DAILY
Qty: 120 CAPSULE | Refills: 1 | Status: SHIPPED | OUTPATIENT
Start: 2019-11-07 | End: 2020-01-21 | Stop reason: SDUPTHER

## 2019-11-07 RX ORDER — PRAVASTATIN SODIUM 40 MG
40 TABLET ORAL DAILY
Qty: 30 TABLET | Refills: 0 | Status: SHIPPED | OUTPATIENT
Start: 2019-11-07 | End: 2020-05-06 | Stop reason: SDUPTHER

## 2019-11-07 ASSESSMENT — PATIENT HEALTH QUESTIONNAIRE - PHQ9
5. POOR APPETITE OR OVEREATING: 0
SUM OF ALL RESPONSES TO PHQ9 QUESTIONS 1 & 2: 2
SUM OF ALL RESPONSES TO PHQ QUESTIONS 1-9: 6
8. MOVING OR SPEAKING SO SLOWLY THAT OTHER PEOPLE COULD HAVE NOTICED. OR THE OPPOSITE, BEING SO FIGETY OR RESTLESS THAT YOU HAVE BEEN MOVING AROUND A LOT MORE THAN USUAL: 0
4. FEELING TIRED OR HAVING LITTLE ENERGY: 1
9. THOUGHTS THAT YOU WOULD BE BETTER OFF DEAD, OR OF HURTING YOURSELF: 0
10. IF YOU CHECKED OFF ANY PROBLEMS, HOW DIFFICULT HAVE THESE PROBLEMS MADE IT FOR YOU TO DO YOUR WORK, TAKE CARE OF THINGS AT HOME, OR GET ALONG WITH OTHER PEOPLE: 0
SUM OF ALL RESPONSES TO PHQ QUESTIONS 1-9: 6
3. TROUBLE FALLING OR STAYING ASLEEP: 1
6. FEELING BAD ABOUT YOURSELF - OR THAT YOU ARE A FAILURE OR HAVE LET YOURSELF OR YOUR FAMILY DOWN: 1
1. LITTLE INTEREST OR PLEASURE IN DOING THINGS: 1
7. TROUBLE CONCENTRATING ON THINGS, SUCH AS READING THE NEWSPAPER OR WATCHING TELEVISION: 1
2. FEELING DOWN, DEPRESSED OR HOPELESS: 1

## 2019-11-07 ASSESSMENT — ENCOUNTER SYMPTOMS
ABDOMINAL PAIN: 0
NAUSEA: 0
COUGH: 0
SHORTNESS OF BREATH: 0
WHEEZING: 0
DIARRHEA: 0
CONSTIPATION: 0
VOMITING: 0

## 2019-11-11 ENCOUNTER — OFFICE VISIT (OUTPATIENT)
Dept: PSYCHOLOGY | Age: 64
End: 2019-11-11
Payer: MEDICAID

## 2019-11-11 DIAGNOSIS — F33.0 MILD EPISODE OF RECURRENT MAJOR DEPRESSIVE DISORDER (HCC): Primary | ICD-10-CM

## 2019-11-11 PROCEDURE — 90791 PSYCH DIAGNOSTIC EVALUATION: CPT | Performed by: PSYCHOLOGIST

## 2019-11-11 PROCEDURE — 99999 PR OFFICE/OUTPT VISIT,PROCEDURE ONLY: CPT | Performed by: PSYCHOLOGIST

## 2019-11-11 ASSESSMENT — PATIENT HEALTH QUESTIONNAIRE - PHQ9
SUM OF ALL RESPONSES TO PHQ QUESTIONS 1-9: 6
8. MOVING OR SPEAKING SO SLOWLY THAT OTHER PEOPLE COULD HAVE NOTICED. OR THE OPPOSITE, BEING SO FIGETY OR RESTLESS THAT YOU HAVE BEEN MOVING AROUND A LOT MORE THAN USUAL: 0
4. FEELING TIRED OR HAVING LITTLE ENERGY: 1
7. TROUBLE CONCENTRATING ON THINGS, SUCH AS READING THE NEWSPAPER OR WATCHING TELEVISION: 1
SUM OF ALL RESPONSES TO PHQ9 QUESTIONS 1 & 2: 2
3. TROUBLE FALLING OR STAYING ASLEEP: 0
SUM OF ALL RESPONSES TO PHQ QUESTIONS 1-9: 6
9. THOUGHTS THAT YOU WOULD BE BETTER OFF DEAD, OR OF HURTING YOURSELF: 0
2. FEELING DOWN, DEPRESSED OR HOPELESS: 1
10. IF YOU CHECKED OFF ANY PROBLEMS, HOW DIFFICULT HAVE THESE PROBLEMS MADE IT FOR YOU TO DO YOUR WORK, TAKE CARE OF THINGS AT HOME, OR GET ALONG WITH OTHER PEOPLE: 1
6. FEELING BAD ABOUT YOURSELF - OR THAT YOU ARE A FAILURE OR HAVE LET YOURSELF OR YOUR FAMILY DOWN: 1
1. LITTLE INTEREST OR PLEASURE IN DOING THINGS: 1
5. POOR APPETITE OR OVEREATING: 1

## 2019-11-22 ENCOUNTER — OFFICE VISIT (OUTPATIENT)
Dept: ORTHOPEDIC SURGERY | Age: 64
End: 2019-11-22
Payer: MEDICAID

## 2019-11-22 VITALS
WEIGHT: 156 LBS | DIASTOLIC BLOOD PRESSURE: 76 MMHG | BODY MASS INDEX: 32.75 KG/M2 | HEIGHT: 58 IN | HEART RATE: 115 BPM | SYSTOLIC BLOOD PRESSURE: 145 MMHG

## 2019-11-22 DIAGNOSIS — M19.011 PRIMARY OSTEOARTHRITIS OF RIGHT SHOULDER: Primary | ICD-10-CM

## 2019-11-22 DIAGNOSIS — M75.31 CALCIFIC TENDINITIS OF RIGHT SHOULDER: ICD-10-CM

## 2019-11-22 DIAGNOSIS — M25.511 CHRONIC RIGHT SHOULDER PAIN: ICD-10-CM

## 2019-11-22 DIAGNOSIS — G89.29 CHRONIC RIGHT SHOULDER PAIN: ICD-10-CM

## 2019-11-22 PROCEDURE — 20610 DRAIN/INJ JOINT/BURSA W/O US: CPT | Performed by: PHYSICIAN ASSISTANT

## 2019-11-22 PROCEDURE — 3017F COLORECTAL CA SCREEN DOC REV: CPT | Performed by: PHYSICIAN ASSISTANT

## 2019-11-22 PROCEDURE — 99203 OFFICE O/P NEW LOW 30 MIN: CPT | Performed by: PHYSICIAN ASSISTANT

## 2019-11-22 PROCEDURE — G8417 CALC BMI ABV UP PARAM F/U: HCPCS | Performed by: PHYSICIAN ASSISTANT

## 2019-11-22 PROCEDURE — G8484 FLU IMMUNIZE NO ADMIN: HCPCS | Performed by: PHYSICIAN ASSISTANT

## 2019-11-22 PROCEDURE — 1036F TOBACCO NON-USER: CPT | Performed by: PHYSICIAN ASSISTANT

## 2019-11-22 PROCEDURE — G8427 DOCREV CUR MEDS BY ELIG CLIN: HCPCS | Performed by: PHYSICIAN ASSISTANT

## 2019-11-22 RX ORDER — BETAMETHASONE SODIUM PHOSPHATE AND BETAMETHASONE ACETATE 3; 3 MG/ML; MG/ML
12 INJECTION, SUSPENSION INTRA-ARTICULAR; INTRALESIONAL; INTRAMUSCULAR; SOFT TISSUE ONCE
Status: COMPLETED | OUTPATIENT
Start: 2019-11-22 | End: 2019-11-22

## 2019-11-22 RX ADMIN — BETAMETHASONE SODIUM PHOSPHATE AND BETAMETHASONE ACETATE 12 MG: 3; 3 INJECTION, SUSPENSION INTRA-ARTICULAR; INTRALESIONAL; INTRAMUSCULAR; SOFT TISSUE at 14:41

## 2020-01-09 LAB — DIABETIC RETINOPATHY: NEGATIVE

## 2020-01-21 RX ORDER — ACYCLOVIR 200 MG/1
400 CAPSULE ORAL 2 TIMES DAILY
Qty: 120 CAPSULE | Refills: 1 | Status: SHIPPED | OUTPATIENT
Start: 2020-01-21 | End: 2020-02-06 | Stop reason: SDUPTHER

## 2020-01-21 RX ORDER — CYCLOBENZAPRINE HCL 5 MG
5 TABLET ORAL NIGHTLY
Qty: 90 TABLET | Refills: 0 | Status: SHIPPED | OUTPATIENT
Start: 2020-01-21 | End: 2020-02-06 | Stop reason: SDUPTHER

## 2020-01-21 NOTE — TELEPHONE ENCOUNTER
Pt is asking for a refill to [facility] in [place], [place].     Last appt 11/07/2019  Next appt 02/06/2020    Please call Pt if unable  Thank you

## 2020-01-29 ASSESSMENT — ENCOUNTER SYMPTOMS
ABDOMINAL PAIN: 0
WHEEZING: 0
SHORTNESS OF BREATH: 0
DIARRHEA: 0
VOMITING: 0
COUGH: 0
CONSTIPATION: 0
NAUSEA: 0

## 2020-01-29 NOTE — PROGRESS NOTES
Office Visit  2/6/2020    Subjective:  Chief Complaint   Patient presents with    Mood Swings     \"better\"    Hypertension     f/u     HPI:  Alondra Talamantes is a 63 y.o. female who presents to the clinic today for follow up. T1DM- Pt takes Lantus and Humalog with sliding scale. She follows with endocrine - Dr. Candice Liu. No episodes of hypoglycemia. Asymptomatic.      HTN- Pt reports that she was taking losartan for kidneys. Stopped d/t cough. Approved by endocrinology. BP well controlled today without medications. Asymptomatic. No chest pain, palpitations, shortness of breath, trouble breathing, lightheadedness, dizziness or blurred vision.     Depression/PTSD/Anxiety-Pt reports her  tried to commit suicide multiple times. Her  was placed in a facility and his family took him and now she is unable to see him. Today is her late 's birthday and her fathers date of passing. Life stressors are frequent. Pt is taking Zoloft 100 mg daily and amitriptyline 100 mg nightly. States her mood is doing better. She would like to remain on the current regimen. No SI/HI. Did see a psychologist- reports that this went \"pretty good. \"    HLD- Allergy to atorvastatin- swelling reported \"all over my body. \" Took pravastatin with with relief but insurance stopped covering it. Not on any statin at this time.     Genital herpes- Pt reports that she takes acyclovir 400 mg once nightly- prescribed for BID. She states she increases the dose with a flare, which she has not experienced.     Vit D deficiency- Pt reports that she takes these daily. Asymptomatic.      Right shoulder pains- She also reports chronic shoulder pains that have been present for years.  No injury/trauma. 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 Stable. Referred to ortho and had a cortisone injection. Ortho had Xrays completed. Takes flexeril PRN. No acute concerns.     Review of Systems   Constitutional: Negative for Indications: with sliding scale Sliding scal   <200 = 0 units   221-250 = 1 unit  251-300 = 2 units   301 - 350 = 3 units   351-400= 4 units   401-430 = 5 units      brimonidine (ALPHAGAN) 0.2 % ophthalmic solution Place 1 drop into both eyes every 12 hours      prednisoLONE acetate (PRED FORTE) 1 % ophthalmic suspension Place 4 drops into both eyes 4 times daily       Patient Active Problem List   Diagnosis    Anxiety and depression    PTSD (post-traumatic stress disorder)    Type 2 diabetes mellitus without complication, with long-term current use of insulin ([facility])    Essential hypertension    Genital herpes simplex    Hyperlipidemia      Wt Readings from Last 3 Encounters:   02/06/20 158 lb (71.7 kg)   11/22/19 156 lb (70.8 kg)   11/07/19 157 lb (71.2 kg)     BP Readings from Last 3 Encounters:   02/06/20 (!) 107/59   11/22/19 (!) 145/76   11/07/19 126/70     The 10-year ASCVD risk score (Jennifer Cabrera, et al., 2013) is: 6.2%    Values used to calculate the score:      Age: 63 years      Sex: Female      Is Non- : No      Diabetic: Yes      Tobacco smoker: No      Systolic Blood Pressure: 189 mmHg      Is BP treated: No      HDL Cholesterol: 58 mg/dL      Total Cholesterol: 212 mg/dL    PHQ-9 Total Score: 4 (2/6/2020  2:16 PM)  Thoughts that you would be better off dead, or of hurting yourself in some way: 0 (2/6/2020  2:16 PM)    Objective/Physical Exam:  BP (!) 107/59   Pulse 94   Wt 158 lb (71.7 kg)   SpO2 98%   BMI 33.02 kg/m²   Body mass index is 33.02 kg/m². Physical Exam  Vitals signs reviewed. Constitutional:       General: She is not in acute distress. Appearance: She is well-developed. She is not diaphoretic. HENT:      Head: Normocephalic and atraumatic. Eyes:      Pupils: Pupils are equal, round, and reactive to light. Cardiovascular:      Rate and Rhythm: Normal rate and regular rhythm.    Pulmonary:      Effort: Pulmonary effort is normal. No respiratory distress. Breath sounds: Normal breath sounds. No wheezing or rales. Chest:      Chest wall: No tenderness. Abdominal:      General: Bowel sounds are normal.      Palpations: Abdomen is soft. Skin:     General: Skin is warm and dry. Coloration: Skin is not pale. Findings: No erythema or rash. Neurological:      Mental Status: She is alert and oriented to person, place, and time. Coordination: Coordination normal.   Psychiatric:         Mood and Affect: Mood normal.       Assessment and Plan:  Nathalia Saldivar was seen today for mood swings and hypertension. Diagnoses and all orders for this visit:    PTSD (post-traumatic stress disorder)  -    Well controlled on the current regimen. No SI/HI.  - She is seeing psych PRN. - Continue current regimen. - sertraline (ZOLOFT) 100 MG tablet; TAKE 1 TABLET BY MOUTH ONCE DAILY  -     amitriptyline (ELAVIL) 100 MG tablet; Take 1 tablet by mouth nightly    Genital herpes simplex, unspecified site   - Well controlled on the current regimen. No recent flares. Continue current regimen. - acyclovir (ZOVIRAX) 200 MG capsule; Take 2 capsules by mouth 2 times daily    Chronic right shoulder pain  -    Continue seeing Ortho. - cyclobenzaprine (FLEXERIL) 5 MG tablet; Take 1 tablet by mouth nightly- refilled today    Seasonal allergic rhinitis due to other allergic trigger  -    Well-controlled at this time. Continue current regimen.  - fluticasone (FLONASE) 50 MCG/ACT nasal spray; 1 spray by Each Nostril route daily    Essential hypertension   - Lifestyle modifications such as exercise, weight loss and healthy diet encouraged and reviewed with the pt. - Well controlled today. Continue current regimen. Mixed hyperlipidemia   - ASCVD risk score reviewed. - Lifestyle modifications such as exercise, weight loss and healthy diet encouraged and reviewed with the pt. Will monitor.     Vitamin D deficiency   - Taking vit D supplements   - Reviewed repeating labs at this time for re-evaluation. She would like to wait until yearly labs    Well woman exam  -    Has not had pap smear completed. She would like to see OBGYN. Referral placed. - Lisa Knutson MD, Gynecology, [facility]    Continue with endocrinology. Return in about 3 months (around 5/6/2020) for HTN/mood/HLD/genital herpes/vit D f/u, or sooner if needed. Pt will call if symptoms worsen or fail to improve. All questions answered. Pt states no further questions or concerns at this time.    Electronically signed by: Clint Keller 02/06/20

## 2020-01-31 ENCOUNTER — TELEPHONE (OUTPATIENT)
Dept: INTERNAL MEDICINE CLINIC | Age: 65
End: 2020-01-31

## 2020-02-06 ENCOUNTER — TELEPHONE (OUTPATIENT)
Dept: INTERNAL MEDICINE CLINIC | Age: 65
End: 2020-02-06

## 2020-02-06 ENCOUNTER — OFFICE VISIT (OUTPATIENT)
Dept: INTERNAL MEDICINE CLINIC | Age: 65
End: 2020-02-06
Payer: MEDICAID

## 2020-02-06 VITALS
WEIGHT: 158 LBS | BODY MASS INDEX: 33.02 KG/M2 | SYSTOLIC BLOOD PRESSURE: 107 MMHG | DIASTOLIC BLOOD PRESSURE: 59 MMHG | OXYGEN SATURATION: 98 % | HEART RATE: 94 BPM

## 2020-02-06 LAB
CONTROL: NORMAL
HEMOCCULT STL QL: NEGATIVE

## 2020-02-06 PROCEDURE — 99214 OFFICE O/P EST MOD 30 MIN: CPT | Performed by: NURSE PRACTITIONER

## 2020-02-06 PROCEDURE — G8417 CALC BMI ABV UP PARAM F/U: HCPCS | Performed by: NURSE PRACTITIONER

## 2020-02-06 PROCEDURE — 82274 ASSAY TEST FOR BLOOD FECAL: CPT | Performed by: NURSE PRACTITIONER

## 2020-02-06 PROCEDURE — 1036F TOBACCO NON-USER: CPT | Performed by: NURSE PRACTITIONER

## 2020-02-06 PROCEDURE — G8427 DOCREV CUR MEDS BY ELIG CLIN: HCPCS | Performed by: NURSE PRACTITIONER

## 2020-02-06 PROCEDURE — 96160 PT-FOCUSED HLTH RISK ASSMT: CPT | Performed by: NURSE PRACTITIONER

## 2020-02-06 PROCEDURE — G8484 FLU IMMUNIZE NO ADMIN: HCPCS | Performed by: NURSE PRACTITIONER

## 2020-02-06 PROCEDURE — 3017F COLORECTAL CA SCREEN DOC REV: CPT | Performed by: NURSE PRACTITIONER

## 2020-02-06 RX ORDER — AMITRIPTYLINE HYDROCHLORIDE 100 MG/1
100 TABLET, FILM COATED ORAL NIGHTLY
Qty: 90 TABLET | Refills: 1 | Status: SHIPPED | OUTPATIENT
Start: 2020-02-06 | End: 2020-08-06 | Stop reason: SDUPTHER

## 2020-02-06 RX ORDER — ACYCLOVIR 200 MG/1
400 CAPSULE ORAL 2 TIMES DAILY
Qty: 120 CAPSULE | Refills: 1 | Status: SHIPPED | OUTPATIENT
Start: 2020-02-06 | End: 2020-05-06 | Stop reason: SDUPTHER

## 2020-02-06 RX ORDER — CYCLOBENZAPRINE HCL 5 MG
5 TABLET ORAL NIGHTLY
Qty: 90 TABLET | Refills: 1 | Status: SHIPPED | OUTPATIENT
Start: 2020-02-06 | End: 2020-09-22

## 2020-02-06 RX ORDER — FLUTICASONE PROPIONATE 50 MCG
1 SPRAY, SUSPENSION (ML) NASAL DAILY
Qty: 1 BOTTLE | Refills: 0 | Status: SHIPPED | OUTPATIENT
Start: 2020-02-06 | End: 2020-05-06 | Stop reason: SDUPTHER

## 2020-02-06 RX ORDER — SERTRALINE HYDROCHLORIDE 100 MG/1
TABLET, FILM COATED ORAL
Qty: 90 TABLET | Refills: 1 | Status: SHIPPED | OUTPATIENT
Start: 2020-02-06 | End: 2020-08-06 | Stop reason: SDUPTHER

## 2020-02-06 SDOH — ECONOMIC STABILITY: INCOME INSECURITY: HOW HARD IS IT FOR YOU TO PAY FOR THE VERY BASICS LIKE FOOD, HOUSING, MEDICAL CARE, AND HEATING?: NOT HARD AT ALL

## 2020-02-06 SDOH — ECONOMIC STABILITY: FOOD INSECURITY: WITHIN THE PAST 12 MONTHS, THE FOOD YOU BOUGHT JUST DIDN'T LAST AND YOU DIDN'T HAVE MONEY TO GET MORE.: NEVER TRUE

## 2020-02-06 SDOH — ECONOMIC STABILITY: TRANSPORTATION INSECURITY
IN THE PAST 12 MONTHS, HAS LACK OF TRANSPORTATION KEPT YOU FROM MEETINGS, WORK, OR FROM GETTING THINGS NEEDED FOR DAILY LIVING?: NO

## 2020-02-06 SDOH — ECONOMIC STABILITY: TRANSPORTATION INSECURITY
IN THE PAST 12 MONTHS, HAS THE LACK OF TRANSPORTATION KEPT YOU FROM MEDICAL APPOINTMENTS OR FROM GETTING MEDICATIONS?: NO

## 2020-02-06 SDOH — ECONOMIC STABILITY: FOOD INSECURITY: WITHIN THE PAST 12 MONTHS, YOU WORRIED THAT YOUR FOOD WOULD RUN OUT BEFORE YOU GOT MONEY TO BUY MORE.: NEVER TRUE

## 2020-02-06 ASSESSMENT — PATIENT HEALTH QUESTIONNAIRE - PHQ9
5. POOR APPETITE OR OVEREATING: 0
2. FEELING DOWN, DEPRESSED OR HOPELESS: 1
8. MOVING OR SPEAKING SO SLOWLY THAT OTHER PEOPLE COULD HAVE NOTICED. OR THE OPPOSITE, BEING SO FIGETY OR RESTLESS THAT YOU HAVE BEEN MOVING AROUND A LOT MORE THAN USUAL: 0
9. THOUGHTS THAT YOU WOULD BE BETTER OFF DEAD, OR OF HURTING YOURSELF: 0
6. FEELING BAD ABOUT YOURSELF - OR THAT YOU ARE A FAILURE OR HAVE LET YOURSELF OR YOUR FAMILY DOWN: 0
4. FEELING TIRED OR HAVING LITTLE ENERGY: 1
10. IF YOU CHECKED OFF ANY PROBLEMS, HOW DIFFICULT HAVE THESE PROBLEMS MADE IT FOR YOU TO DO YOUR WORK, TAKE CARE OF THINGS AT HOME, OR GET ALONG WITH OTHER PEOPLE: 0
7. TROUBLE CONCENTRATING ON THINGS, SUCH AS READING THE NEWSPAPER OR WATCHING TELEVISION: 1
SUM OF ALL RESPONSES TO PHQ QUESTIONS 1-9: 4
1. LITTLE INTEREST OR PLEASURE IN DOING THINGS: 1
SUM OF ALL RESPONSES TO PHQ9 QUESTIONS 1 & 2: 2
SUM OF ALL RESPONSES TO PHQ QUESTIONS 1-9: 4
3. TROUBLE FALLING OR STAYING ASLEEP: 0

## 2020-02-25 LAB
CANDIDA SPECIES, DNA PROBE: NORMAL
GARDNERELLA VAGINALIS, DNA PROBE: NORMAL
TRICHOMONAS VAGINALIS DNA: NORMAL

## 2020-02-26 LAB
HPV COMMENT: NORMAL
HPV TYPE 16: NOT DETECTED
HPV TYPE 18: NOT DETECTED
HPVOH (OTHER TYPES): NOT DETECTED

## 2020-02-28 ENCOUNTER — TELEPHONE (OUTPATIENT)
Dept: INTERNAL MEDICINE CLINIC | Age: 65
End: 2020-02-28

## 2020-05-06 ENCOUNTER — VIRTUAL VISIT (OUTPATIENT)
Dept: INTERNAL MEDICINE CLINIC | Age: 65
End: 2020-05-06
Payer: MEDICAID

## 2020-05-06 ENCOUNTER — TELEPHONE (OUTPATIENT)
Dept: INTERNAL MEDICINE CLINIC | Age: 65
End: 2020-05-06

## 2020-05-06 VITALS — SYSTOLIC BLOOD PRESSURE: 119 MMHG | HEART RATE: 109 BPM | DIASTOLIC BLOOD PRESSURE: 77 MMHG

## 2020-05-06 PROCEDURE — 2022F DILAT RTA XM EVC RTNOPTHY: CPT | Performed by: NURSE PRACTITIONER

## 2020-05-06 PROCEDURE — 99214 OFFICE O/P EST MOD 30 MIN: CPT | Performed by: NURSE PRACTITIONER

## 2020-05-06 PROCEDURE — 1036F TOBACCO NON-USER: CPT | Performed by: NURSE PRACTITIONER

## 2020-05-06 PROCEDURE — 3017F COLORECTAL CA SCREEN DOC REV: CPT | Performed by: NURSE PRACTITIONER

## 2020-05-06 PROCEDURE — G8427 DOCREV CUR MEDS BY ELIG CLIN: HCPCS | Performed by: NURSE PRACTITIONER

## 2020-05-06 PROCEDURE — G8417 CALC BMI ABV UP PARAM F/U: HCPCS | Performed by: NURSE PRACTITIONER

## 2020-05-06 PROCEDURE — 3046F HEMOGLOBIN A1C LEVEL >9.0%: CPT | Performed by: NURSE PRACTITIONER

## 2020-05-06 RX ORDER — ACYCLOVIR 200 MG/1
400 CAPSULE ORAL 2 TIMES DAILY
Qty: 120 CAPSULE | Refills: 1 | Status: SHIPPED | OUTPATIENT
Start: 2020-05-06 | End: 2020-08-06 | Stop reason: SDUPTHER

## 2020-05-06 RX ORDER — PRAVASTATIN SODIUM 40 MG
40 TABLET ORAL DAILY
Qty: 90 TABLET | Refills: 0 | Status: SHIPPED | OUTPATIENT
Start: 2020-05-06 | End: 2020-08-03

## 2020-05-06 RX ORDER — FLUTICASONE PROPIONATE 50 MCG
1 SPRAY, SUSPENSION (ML) NASAL DAILY
Qty: 1 BOTTLE | Refills: 0 | Status: SHIPPED | OUTPATIENT
Start: 2020-05-06 | End: 2020-08-06 | Stop reason: SDUPTHER

## 2020-05-06 RX ORDER — AMITRIPTYLINE HYDROCHLORIDE 100 MG/1
100 TABLET, FILM COATED ORAL NIGHTLY
Qty: 90 TABLET | Refills: 1 | Status: CANCELLED | OUTPATIENT
Start: 2020-05-06

## 2020-05-06 ASSESSMENT — ENCOUNTER SYMPTOMS
COUGH: 0
NAUSEA: 0
WHEEZING: 0
SHORTNESS OF BREATH: 0
DIARRHEA: 0
VOMITING: 0
ABDOMINAL PAIN: 0
CONSTIPATION: 0

## 2020-05-06 NOTE — PROGRESS NOTES
TELEHEALTH EVALUATION -- Audio/Visual (During [id number]-61 public health emergency)  Office Visit  5/6/2020    Subjective:  Chief Complaint   Patient presents with    Hypertension     HPI:  Robyn Cook is a 63 y.o. female who presents today for follow up. T1DM- Pt takes Lantus and Humalog with sliding scale. She follows with endocrine - Dr. Shivam Diaz episodes of hypoglycemia. Asymptomatic.      HTN- Pt reports that she was taking losartan for kidneys. Stopped d/t cough- approved by endocrinology. Not taking BP at home. BP controlled today without medications. Asymptomatic. No chest pain, palpitations, shortness of breath, trouble breathing, lightheadedness, dizziness or blurred vision. Walks for exercise- 3x/week. States her diet is \"pretty good. \"     Depression/PTSD/Anxiety-Pt reports her  tried to commit suicide multiple times. Her  was placed in a facility and his family took him and now the pt is unable to see him. Life stressors are multiple. Pt is taking Zoloft 100 mg daily and amitriptyline 100 mg nightly. States her mood is doing better. She would like to remain on the current medication regimen. No SI/HI. Has not seen a psychologist in a while. States she is trying to deal with all life stressors and is having trouble processing it all alone. Denies SI/HI. HLD- Allergy to atorvastatin- swelling reported \"all over my body. \" Took pravastatin with with relief (and no side effects) but insurance stopped covering it.  Not on any statin at this time. Genital herpes- Pt reports that she takes acyclovir 400 mg once nightly- prescribed for BID. She states she increases the dose with a flare, which she has not experienced recently. Vit D deficiency- Pt reports that she takes these daily. Asymptomatic.      Allergic rhinitis- well controlled with flonase. Would like a refill.     Right shoulder pains- She also reports chronic shoulder pains that have been present for years.  No

## 2020-05-06 NOTE — TELEPHONE ENCOUNTER
----- Message from Richardson Zepeda, [address] - CNP sent at 5/6/2020  1:37 PM EDT -----  Please call for denial on pravastatin

## 2020-05-19 ENCOUNTER — TELEPHONE (OUTPATIENT)
Dept: INTERNAL MEDICINE CLINIC | Age: 65
End: 2020-05-19

## 2020-05-19 ENCOUNTER — NURSE TRIAGE (OUTPATIENT)
Dept: OTHER | Facility: CLINIC | Age: 65
End: 2020-05-19

## 2020-05-19 NOTE — TELEPHONE ENCOUNTER
Reason for Disposition   [1] COVID-19 EXPOSURE (Close Contact) within last 14 days AND [2] NO cough, fever, or breathing difficulty    Answer Assessment - Initial Assessment Questions  1. CLOSE CONTACT: \"Who is the person with the confirmed or suspected COVID-19 infection that you were exposed to? \"      A lady at her gym  2. PLACE of CONTACT: \"Where were you when you were exposed to COVID-19? \" (e.g., home, school, medical waiting room; which city?)      A lady at a gym  3. TYPE of CONTACT: \"How much contact was there? \" (e.g., sitting next to, live in same house, work in same office, same building)      Sat near her on several ocassions  4. DURATION of CONTACT: \"How long were you in contact with the COVID-19 patient? \" (e.g., a few seconds, passed by person, a few minutes, live with the patient)      2 hours each time at the gym  5. DATE of CONTACT: \"When did you have contact with a COVID-19 patient? \" (e.g., how many days ago)      Last time was about 2 months ago  6. TRAVEL: \"Have you traveled out of the country recently? \" If so, \"When and where? \"      * Also ask about out-of-state travel, since the [facility] has identified some high risk cities for community spread in the [facility]. * Note: Travel becomes less relevant if there is widespread community transmission where the patient lives. no  7. COMMUNITY SPREAD: \"Are there lots of cases of COVID-19 (community spread) where you live? \" (See public health department website, if unsure)    * MAJOR community spread: high number of cases; numbers of cases are increasing; many people hospitalized. * MINOR community spread: low number of cases; not increasing; few or no people hospitalized      unknown  8. SYMPTOMS: \"Do you have any symptoms? \" (e.g., fever, cough, breathing difficulty)      no  9. PREGNANCY OR POSTPARTUM: \"Is there any chance you are pregnant? \" \"When was your last menstrual period? \" \"Did you deliver in the last 2 weeks? \"      no  10.  HIGH RISK: \"Do

## 2020-08-03 RX ORDER — PRAVASTATIN SODIUM 40 MG
TABLET ORAL
Qty: 90 TABLET | Refills: 0 | Status: SHIPPED | OUTPATIENT
Start: 2020-08-03 | End: 2020-08-06 | Stop reason: SDUPTHER

## 2020-08-05 NOTE — PROGRESS NOTES
TELEHEALTH EVALUATION -- Audio/Visual (During [facility]XX[facility]-93 public health emergency)  Office Visit   8/6/2020    Subjective:  Chief Complaint   Patient presents with    Follow-up     HPI:   Sebastian Jimenez is a 63 y.o. female who presents today for follow up. T1DM- Pt takes Lantus and Humalog with sliding scale. Has automatic blood sugar monitor. She follows with endocrine - Dr. Sabine Granda episodes of hypoglycemia. Asymptomatic.      HTN- using lifestyle modification. Pt reports that she was taking losartan for kidneys. Stopped d/t cough- approved by endocrinology. BP controlled today without medications. Asymptomatic. No chest pain, palpitations, shortness of breath, trouble breathing, lightheadedness, dizziness or blurred vision.     Depression/PTSD/Anxiety-Life stressors are multiple. Pt is taking Zoloft 100 mg daily and amitriptyline 100 mg nightly. States her mood is doing better. Has not seen a psychologist in a while. Denies SI/HI.     HLD- Allergy to atorvastatin- swelling reported \"all over my body. \" Took pravastatin without concerns (and no side effects). Genital herpes- Pt reports that she takes acyclovir 400 mg once nightly- prescribed for BID. She states she increases the dose with a flare, which she has not experienced recently.     Vit D deficiency- Pt reports that she takes these supplements daily. Asymptomatic.      Allergic rhinitis- well controlled with flonase. Would like a refill. Review of Systems   Constitutional: Negative for chills, fatigue, fever and unexpected weight change. Eyes: Negative for visual disturbance. Respiratory: Negative for cough, shortness of breath and wheezing. Cardiovascular: Negative for chest pain, palpitations and leg swelling. Gastrointestinal: Negative for abdominal pain, constipation, diarrhea, nausea and vomiting. Skin: Negative for pallor and rash. Neurological: Negative for dizziness, weakness, light-headedness, numbness and headaches. Psychiatric/Behavioral: Negative for dysphoric mood, self-injury, sleep disturbance and suicidal ideas. The patient is not nervous/anxious.       Allergies   Allergen Reactions    Atorvastatin Swelling    Lisinopril Other (See Comments)    Naproxen Nausea Only    Oxycodone-Acetaminophen Other (See Comments)       Current Outpatient Rx   Medication Sig Dispense Refill    pravastatin (PRAVACHOL) 40 MG tablet Take 1 tablet by mouth once daily 90 tablet 1    sertraline (ZOLOFT) 100 MG tablet TAKE 1 TABLET BY MOUTH ONCE DAILY 90 tablet 1    Cholecalciferol (VITAMIN D3) 25 MCG (1000 UT) CAPS Take 1 capsule by mouth daily 60 capsule 1    acyclovir (ZOVIRAX) 200 MG capsule Take 2 capsules by mouth 2 times daily 120 capsule 1    fluticasone (FLONASE) 50 MCG/ACT nasal spray 1 spray by Each Nostril route daily 1 Bottle 2    amitriptyline (ELAVIL) 100 MG tablet Take 1 tablet by mouth nightly 90 tablet 1    cyclobenzaprine (FLEXERIL) 5 MG tablet Take 1 tablet by mouth nightly 90 tablet 1    insulin glargine (LANTUS) 100 UNIT/ML injection vial Inject 50 Units into the skin every morning      brimonidine (ALPHAGAN) 0.2 % ophthalmic solution Place 1 drop into both eyes every 12 hours      prednisoLONE acetate (PRED FORTE) 1 % ophthalmic suspension Place 4 drops into both eyes 4 times daily      Calcium-Vitamins C & D (CALCIUM/C/D PO) Take by mouth      Multiple Vitamins-Minerals (MULTI-AFIA PO) Take by mouth      Glucosamine-MSM-Hyaluronic Acd (JOINT HEALTH PO) Take by mouth      aspirin 81 MG tablet Take 81 mg by mouth daily      insulin lispro (HUMALOG) 100 UNIT/ML injection vial Inject 15 Units into the skin 3 times daily (before meals) Indications: with sliding scale Sliding scal   <200 = 0 units   221-250 = 1 unit  251-300 = 2 units   301 - 350 = 3 units   351-400= 4 units   401-430 = 5 units         Patient Active Problem List   Diagnosis    Anxiety and depression    PTSD (post-traumatic stress disorder)  Type 2 diabetes mellitus without complication, with long-term current use of insulin ([address]    Essential hypertension    Genital herpes simplex    Hyperlipidemia        Wt Readings from Last 3 Encounters:   02/06/20 158 lb (71.7 kg)   11/22/19 156 lb (70.8 kg)   11/07/19 157 lb (71.2 kg)     BP Readings from Last 3 Encounters:   05/06/20 119/77   02/06/20 (!) 107/59   11/22/19 (!) 145/76     The 10-year ASCVD risk score (Brody Hernandez et al., 2013) is: 7.5%    Values used to calculate the score:      Age: 63 years      Sex: Female      Is Non- : No      Diabetic: Yes      Tobacco smoker: No      Systolic Blood Pressure: 516 mmHg      Is BP treated: No      HDL Cholesterol: 58 mg/dL      Total Cholesterol: 212 mg/dL    Objective/Physical Exam:  Virtual Video Exam  Patient-Reported Vitals 8/6/2020   Patient-Reported Weight 157 lb   Patient-Reported Height 4'11''   Patient-Reported Systolic 205   Patient-Reported Diastolic 67   Patient-Reported Pulse 103    ^no access to other VS d/t VV per pt. Physical Exam  Constitutional:       General: She is not in acute distress. Appearance: Normal appearance. She is not ill-appearing. Pulmonary:      Effort: Pulmonary effort is normal. No respiratory distress. Skin:     Coloration: Skin is not jaundiced or pale. Neurological:      Mental Status: She is alert. Comments: No facial asymmetry noted   Psychiatric:         Mood and Affect: Mood normal.     Due to this being a TeleHealth encounter, evaluation of the following organ systems is limited: Vitals/Constitutional/EENT/Resp/CV/GI//MS/Neuro/Skin/Heme-Lymph-Imm. Assessment and Plan:  Ron Castellano was seen today for follow-up. Diagnoses and all orders for this visit:    Essential hypertension   - Blood pressure well controlled using lifestyle modifications. Pulse is elevated. Patient reports she was at [facility] when she took this reading, using their machine.   She states she had just run into [employer] to get this completed which is why she thinks her HR was high. Asymptomatic.   - She will continue to monitor and call with elevated blood pressure or HR readings. Mixed hyperlipidemia  -    No side effects. Will re-evaluate labs next visit. - pravastatin (PRAVACHOL) 40 MG tablet; Take 1 tablet by mouth once daily- refilled today    PTSD (post-traumatic stress disorder)/Anxiety and depression  -     Well controlled on the current regimen. Denies SI/HI. - Recommend pt reschedule with psychology as needed. Pt agreeable  - sertraline (ZOLOFT) 100 MG tablet; TAKE 1 TABLET BY MOUTH ONCE DAILY- refilled today  - Amitriptyline- refilled today    Genital herpes simplex, unspecified site  -    No recent flares. Continue current regimen  - acyclovir (ZOVIRAX) 200 MG capsule; Take 2 capsules by mouth 2 times daily-refilled today    Seasonal allergic rhinitis due to other allergic trigger  -    Well-controlled on current regimen.  - fluticasone (FLONASE) 50 MCG/ACT nasal spray; 1 spray by Each Nostril route daily- refilled today    Type 2 diabetes mellitus without complication, with long-term current use of insulin ([address]   - Continue with endocrine    Vitamin D deficiency  -     Asymptomatic. Continue current regimen. Will reevaluate next lab draw. - Cholecalciferol (VITAMIN D3) 25 MCG (1000 UT) CAPS; Take 1 capsule by mouth daily-refilled today    Return in about 3 months (around 11/6/2020) for 40 min annual exam/labswith mood/HTN/HLD f/u, or sooner if needed. Pt will call if symptoms worsen or fail to improve. Mona Araya is a 63 y.o. female being evaluated by a Virtual Visit (video visit) encounter to address concerns as mentioned above. A caregiver was present when appropriate.  Due to this being a TeleHealth encounter (During [facility] public health emergency), evaluation of the following organ systems was limited: Vitals/Constitutional/EENT/Resp/CV/GI//MS/Neuro/Skin/Heme-Lymph-Imm. Pursuant to the emergency declaration under the [address] [place] [address], [address] [place] [address] authority and the [employer] Act, this Virtual Visit was conducted with patient's (and/or legal guardian's) consent, to reduce the patient's risk of exposure to COVID-19 and provide necessary medical care. The patient (and/or legal guardian) has also been advised to contact this office for worsening conditions or problems, and seek emergency medical treatment and/or call 911 if deemed necessary. Patient identification was verified at the start of the visit: Yes    Total time spent on this encounter: Not billed by time    Services were provided through a video synchronous discussion virtually to substitute for in-person clinic visit. Patient and provider were located at their individual homes. All questions answered. Pt states no further questions or concerns at this time.    Electronically signed by: Samy Bauman 08/06/20

## 2020-08-06 ENCOUNTER — VIRTUAL VISIT (OUTPATIENT)
Dept: INTERNAL MEDICINE CLINIC | Age: 65
End: 2020-08-06
Payer: MEDICAID

## 2020-08-06 PROCEDURE — 1036F TOBACCO NON-USER: CPT | Performed by: NURSE PRACTITIONER

## 2020-08-06 PROCEDURE — 2022F DILAT RTA XM EVC RTNOPTHY: CPT | Performed by: NURSE PRACTITIONER

## 2020-08-06 PROCEDURE — 99214 OFFICE O/P EST MOD 30 MIN: CPT | Performed by: NURSE PRACTITIONER

## 2020-08-06 PROCEDURE — G8417 CALC BMI ABV UP PARAM F/U: HCPCS | Performed by: NURSE PRACTITIONER

## 2020-08-06 PROCEDURE — 3046F HEMOGLOBIN A1C LEVEL >9.0%: CPT | Performed by: NURSE PRACTITIONER

## 2020-08-06 PROCEDURE — G8427 DOCREV CUR MEDS BY ELIG CLIN: HCPCS | Performed by: NURSE PRACTITIONER

## 2020-08-06 PROCEDURE — 3017F COLORECTAL CA SCREEN DOC REV: CPT | Performed by: NURSE PRACTITIONER

## 2020-08-06 RX ORDER — AMITRIPTYLINE HYDROCHLORIDE 100 MG/1
100 TABLET, FILM COATED ORAL NIGHTLY
Qty: 90 TABLET | Refills: 1 | Status: SHIPPED | OUTPATIENT
Start: 2020-08-06 | End: 2020-11-04 | Stop reason: SDUPTHER

## 2020-08-06 RX ORDER — FLUTICASONE PROPIONATE 50 MCG
1 SPRAY, SUSPENSION (ML) NASAL DAILY
Qty: 1 BOTTLE | Refills: 2 | Status: SHIPPED | OUTPATIENT
Start: 2020-08-06 | End: 2020-11-04 | Stop reason: SDUPTHER

## 2020-08-06 RX ORDER — SERTRALINE HYDROCHLORIDE 100 MG/1
TABLET, FILM COATED ORAL
Qty: 90 TABLET | Refills: 1 | Status: SHIPPED | OUTPATIENT
Start: 2020-08-06 | End: 2020-11-04 | Stop reason: SDUPTHER

## 2020-08-06 RX ORDER — ACYCLOVIR 200 MG/1
400 CAPSULE ORAL 2 TIMES DAILY
Qty: 120 CAPSULE | Refills: 1 | Status: SHIPPED | OUTPATIENT
Start: 2020-08-06 | End: 2020-11-04 | Stop reason: SDUPTHER

## 2020-08-06 RX ORDER — PRAVASTATIN SODIUM 40 MG
TABLET ORAL
Qty: 90 TABLET | Refills: 1 | Status: SHIPPED | OUTPATIENT
Start: 2020-08-06 | End: 2020-11-04 | Stop reason: SDUPTHER

## 2020-08-06 ASSESSMENT — ENCOUNTER SYMPTOMS
DIARRHEA: 0
COUGH: 0
NAUSEA: 0
ABDOMINAL PAIN: 0
SHORTNESS OF BREATH: 0
CONSTIPATION: 0
WHEEZING: 0
VOMITING: 0

## 2020-08-11 ENCOUNTER — TELEPHONE (OUTPATIENT)
Dept: INTERNAL MEDICINE CLINIC | Age: 65
End: 2020-08-11

## 2020-08-11 NOTE — TELEPHONE ENCOUNTER
----- Message from Reva Crooks sent at 8/11/2020  3:55 PM EDT -----  Subject: Message to Provider    QUESTIONS  Information for Provider? Pt is requesting a call back. She would like to   know why she is in need of an followup appointment.  ---------------------------------------------------------------------------  --------------  [address]  What is the best way for the office to contact you? OK to leave message on   voicemail  Preferred Call Back Phone Number? [phone]  ---------------------------------------------------------------------------  --------------  SCRIPT ANSWERS  Relationship to Patient?  Self

## 2020-08-11 NOTE — TELEPHONE ENCOUNTER
----- Message from Cecily Mariee, [address] - CNP sent at 8/6/2020  1:52 PM EDT -----  Call to schedule f/u

## 2020-09-01 LAB
AVERAGE GLUCOSE: NORMAL
AVERAGE GLUCOSE: NORMAL
CHOLESTEROL, TOTAL: 179 MG/DL
CHOLESTEROL/HDL RATIO: NORMAL
CREATININE: 0.7 MG/DL
HBA1C MFR BLD: 7 %
HBA1C MFR BLD: 7 %
HDLC SERPL-MCNC: 47 MG/DL (ref 35–70)
LDL CHOLESTEROL CALCULATED: NORMAL
NONHDLC SERPL-MCNC: NORMAL MG/DL
POTASSIUM (K+): 4.6
TRIGL SERPL-MCNC: 120 MG/DL
VLDLC SERPL CALC-MCNC: NORMAL MG/DL

## 2020-09-17 ENCOUNTER — VIRTUAL VISIT (OUTPATIENT)
Dept: PSYCHOLOGY | Age: 65
End: 2020-09-17
Payer: MEDICAID

## 2020-09-17 PROCEDURE — 90791 PSYCH DIAGNOSTIC EVALUATION: CPT | Performed by: PSYCHOLOGIST

## 2020-09-17 NOTE — PROGRESS NOTES
Behavioral Health Consultation  Ly Montalvo, Ph.D.  [facility] Psychologist  9/17/2020  2:40 PM      Time spent with Patient: 28 minutes  This is patient's first [facility] appointment. Reason for Consult:    Chief Complaint   Patient presents with    Depression       Pt provided informed consent for the behavioral health program. Discussed with patient model of service to include the limits of confidentiality (i.e. abuse reporting, suicide intervention, etc.) and short-term intervention focused approach. Pt indicated understanding. Feedback given to PCP. Pursuant to the emergency declaration under the [address] [place] [address], Replaced by [facility] waiver authority and the [employer] Act, this Virtual Visit was conducted, with patient's consent, to reduce the patient's risk of exposure to COVID-19 and provide continuity of care for an established patient. Services were provided through a video synchronous discussion virtually to substitute for in-person clinic visit. Pt gave verbal informed consent to participate in telehealth services. Conducted a risk-benefit analysis and determined that the patient's presenting problems are consistent with the use of telepsychology. Determined that the patient has sufficient knowledge and skills in the use of technology enabling them to adequately benefit from telepsychology. It was determined that this patient was able to be properly treated without an in-person session. Patient verified that they were currently located at the [facility] address that was provided during registration.     Verified the following information:  Patient's identification: Yes  Patient location: [address] [address]   Patient's call back number: [phone]   Patient's emergency contact's name and number, as well as permission to contact them if needed: Extended Emergency Contact Information  Primary Emergency Contact: FredyChristy   [address] Phone: [phone]  Relation: Child     Provider location: [facility] Revering:  Pt seen per PCP re: depressed mood. Patient reported depressive symptoms, including periodic depressed mood, deactivation, and onset insomnia. She sometimes \"gets depressed\" and \"cries about it some\". Symptoms have been present for greater than one year and presented following the death of her mother and the stressors related to her . Reference note by Nupur Shaw on 11/11/2019 for more information. Pt reported that the last time she spoke with her  was September 24th, 2019; Pt does not know how or where he is at the moment. She indicated that her  would have \"episodes\" after his attempted suicide. Pt noted that these \"episodes\" consisted of visual hallucinations and described an example where he thought his family was in the front yard with guns. She reported that recently, her mind feels clearer and she \"relives\" memories. When asked what she wanted out of treatment, she stated that she \"wants to get better\" and needs someone to [facility] to\" about these topics, as she wants to avoid burdening her daughter with her concerns.     Caffeine: 3 cans of soda/day      O:  MSE:    Appearance    alert, cooperative, healthy, mild distress  Impulsive behavior No  Speech    normal rate and normal volume  Mood    Depressed  Affect    depressed affect  Thought Content    intact  Thought Process    linear and coherent  Associations    logical connections  Insight    Good  Judgment    Intact  Orientation    oriented to person, place, time, and general circumstances  Memory    Not tested  Attention/Concentration    intact  Morbid ideation No  Suicide Assessment    no suicidal ideation    History:    Social History:   Social History     Socioeconomic History    Marital status: Legally      Spouse name: Not on file    Number of children: Not on file    Years of

## 2020-10-14 ENCOUNTER — TELEPHONE (OUTPATIENT)
Dept: PSYCHOLOGY | Age: 65
End: 2020-10-14

## 2020-10-15 ENCOUNTER — OFFICE VISIT (OUTPATIENT)
Dept: PSYCHOLOGY | Age: 65
End: 2020-10-15
Payer: MEDICAID

## 2020-10-15 PROCEDURE — 90832 PSYTX W PT 30 MINUTES: CPT | Performed by: PSYCHOLOGIST

## 2020-10-15 NOTE — PROGRESS NOTES
TELEHEALTH VISIT -- Audio/Visual (During [facility]-09 public health emergency)  }  Pursuant to the emergency declaration under the [address] [place] [address], [facility] waiver authority and the [employer] Act, this Virtual Visit was conducted, with patient's consent, to reduce the patient's risk of exposure to COVID-19 and provide continuity of care for an established patient. Services were provided through a video synchronous discussion virtually to substitute for in-person clinic visit. Pt gave verbal informed consent to participate in telehealth services. Conducted a risk-benefit analysis and determined that the patient's presenting problems are consistent with the use of telepsychology. Determined that the patient has sufficient knowledge and skills in the use of technology enabling them to adequately benefit from telepsychology. It was determined that this patient was able to be properly treated without an in-person session. Patient verified that they were currently located at the [facility] address that was provided during registration or another [facility] address, if noted below. Verified the following information:  Patient's identification: Yes  Patient location: [address] [place] [address]   Patient's call back number: [phone]   Patient's emergency contact's name and number, as well as permission to contact them if needed: Extended Emergency Contact Information  Primary Emergency Contact: [address] Phone: [phone]  Relation: Child     Provider location: [facility] Consultation  Suellen Hassan, Ph.D.  Psychologist  10/15/2020  10:39 AM      Time spent with Patient: 25 minutes  This is patient's second  [facility] appointment. Reason for Consult:    Chief Complaint   Patient presents with    Depression       Feedback given to PCP.     S:  Pt seen for f/u of depression and PTSD. Pt reported worsened mood and sxs w preparing to put house on the market. Stated preparing to move has solidified the reality of her situation and bringing up memories (good and bad). Stated she notices more depression if she forgets medication 1 day. Things she might need a different medication to help, but fearful of requiring med her whole life. Experiencing guilt bc other wives take care of their spouses until death. Examined how her circumstances were r/t safety, not unwillingness to provide care. Formerly would go watch granddaughter's cheer practices and talking to the other family watching, but w pandemic has had very limited social contact. Was in [place] [employer] in [place]. Was going before COVID even though it is 90 mins away. Has not determined if she can join local group(s) once she is able to be around others.      O:  MSE:    Appearance    alert, cooperative  Appetite normal  Sleep disturbance Yes  Fatigue Yes  Loss of pleasure No  Impulsive behavior No  Speech    spontaneous, normal rate, normal volume and well articulated  Mood    Anxious  Depressed  Affect    normal affect  Thought Content    intact and excessive guilt  Thought Process    linear, goal directed and coherent  Associations    logical connections  Insight    Fair  Judgment    Intact  Orientation    oriented to person, place, time, and general circumstances  Memory    recent and remote memory intact  Attention/Concentration    intact  Morbid ideation No  Suicide Assessment    no suicidal ideation    History:  Social History:   Social History     Socioeconomic History    Marital status: Legally      Spouse name: Not on file    Number of children: Not on file    Years of education: Not on file    Highest education level: Not on file   Occupational History    Not on file   Social Needs    Financial resource strain: Not hard at all   [employer] insecurity     Worry: Never true     Inability: Never true  Transportation needs     Medical: No     Non-medical: No   Tobacco Use    Smoking status: Never Smoker    Smokeless tobacco: Never Used   Substance and Sexual Activity    Alcohol use: Not Currently     Frequency: Never    Drug use: Never    Sexual activity: Not Currently   Lifestyle    Physical activity     Days per week: Not on file     Minutes per session: Not on file    Stress: Not on file   Relationships    Social connections     Talks on phone: Not on file     Gets together: Not on file     Attends Congregation service: Not on file     Active member of club or organization: Not on file     Attends meetings of clubs or organizations: Not on file     Relationship status: Not on file    Intimate partner violence     Fear of current or ex partner: Not on file     Emotionally abused: Not on file     Physically abused: Not on file     Forced sexual activity: Not on file   Other Topics Concern    Not on file   Social History Narrative    Moved from [address] [place] [address] [address] to [place]. Has a daughter, son-in-law and grandchildren live with her. TOBACCO:   reports that she has never smoked. She has never used smokeless tobacco.  ETOH:   reports previous alcohol use. Diagnosis:  1. PTSD (post-traumatic stress disorder)          Plan:  Pt interventions:  Discussed and set plan for behavioral activation, Discussed self-care (sleep, nutrition, rewarding activities, social support, exercise), Supportive techniques and Identified maladaptive thoughts        Documentation was done using voice recognition dragon software. Every effort was made to ensure accuracy; however, inadvertent, unintentional computerized transcription errors may be present.

## 2020-10-27 RX ORDER — CYCLOBENZAPRINE HCL 5 MG
TABLET ORAL
Qty: 30 TABLET | Refills: 0 | Status: SHIPPED | OUTPATIENT
Start: 2020-10-27 | End: 2020-12-04

## 2020-11-04 ENCOUNTER — OFFICE VISIT (OUTPATIENT)
Dept: INTERNAL MEDICINE CLINIC | Age: 65
End: 2020-11-04
Payer: MEDICAID

## 2020-11-04 VITALS
SYSTOLIC BLOOD PRESSURE: 130 MMHG | HEART RATE: 95 BPM | WEIGHT: 156 LBS | TEMPERATURE: 97.9 F | DIASTOLIC BLOOD PRESSURE: 86 MMHG | BODY MASS INDEX: 32.6 KG/M2 | OXYGEN SATURATION: 98 %

## 2020-11-04 DIAGNOSIS — E10.9 TYPE 1 DIABETES MELLITUS WITHOUT COMPLICATION (HCC): ICD-10-CM

## 2020-11-04 LAB
CREATININE URINE: 89.6 MG/DL (ref 28–259)
MICROALBUMIN UR-MCNC: <1.2 MG/DL
MICROALBUMIN/CREAT UR-RTO: NORMAL MG/G (ref 0–30)

## 2020-11-04 PROCEDURE — G8482 FLU IMMUNIZE ORDER/ADMIN: HCPCS | Performed by: NURSE PRACTITIONER

## 2020-11-04 PROCEDURE — 96160 PT-FOCUSED HLTH RISK ASSMT: CPT | Performed by: NURSE PRACTITIONER

## 2020-11-04 PROCEDURE — 99396 PREV VISIT EST AGE 40-64: CPT | Performed by: NURSE PRACTITIONER

## 2020-11-04 PROCEDURE — 90471 IMMUNIZATION ADMIN: CPT | Performed by: NURSE PRACTITIONER

## 2020-11-04 PROCEDURE — 90686 IIV4 VACC NO PRSV 0.5 ML IM: CPT | Performed by: NURSE PRACTITIONER

## 2020-11-04 PROCEDURE — G8431 POS CLIN DEPRES SCRN F/U DOC: HCPCS | Performed by: NURSE PRACTITIONER

## 2020-11-04 RX ORDER — FLUTICASONE PROPIONATE 50 MCG
1 SPRAY, SUSPENSION (ML) NASAL DAILY
Qty: 1 BOTTLE | Refills: 2 | Status: CANCELLED | OUTPATIENT
Start: 2020-11-04

## 2020-11-04 RX ORDER — ACYCLOVIR 200 MG/1
400 CAPSULE ORAL 2 TIMES DAILY
Qty: 180 CAPSULE | Refills: 0 | Status: SHIPPED | OUTPATIENT
Start: 2020-11-04 | End: 2021-01-04

## 2020-11-04 RX ORDER — FLUTICASONE PROPIONATE 50 MCG
1 SPRAY, SUSPENSION (ML) NASAL DAILY
Qty: 1 BOTTLE | Refills: 2 | Status: SHIPPED | OUTPATIENT
Start: 2020-11-04 | End: 2021-02-05 | Stop reason: SDUPTHER

## 2020-11-04 RX ORDER — SERTRALINE HYDROCHLORIDE 100 MG/1
TABLET, FILM COATED ORAL
Qty: 90 TABLET | Refills: 1 | Status: CANCELLED | OUTPATIENT
Start: 2020-11-04

## 2020-11-04 RX ORDER — PRAVASTATIN SODIUM 40 MG
TABLET ORAL
Qty: 90 TABLET | Refills: 0 | Status: SHIPPED | OUTPATIENT
Start: 2020-11-04 | End: 2021-02-05 | Stop reason: SDUPTHER

## 2020-11-04 RX ORDER — AMITRIPTYLINE HYDROCHLORIDE 100 MG/1
100 TABLET, FILM COATED ORAL NIGHTLY
Qty: 90 TABLET | Refills: 0 | Status: SHIPPED | OUTPATIENT
Start: 2020-11-04 | End: 2021-02-05 | Stop reason: SDUPTHER

## 2020-11-04 RX ORDER — CYCLOBENZAPRINE HCL 5 MG
TABLET ORAL
Qty: 30 TABLET | Refills: 0 | Status: CANCELLED | OUTPATIENT
Start: 2020-11-04

## 2020-11-04 RX ORDER — SERTRALINE HYDROCHLORIDE 100 MG/1
TABLET, FILM COATED ORAL
Qty: 90 TABLET | Refills: 0 | Status: SHIPPED | OUTPATIENT
Start: 2020-11-04 | End: 2021-02-05 | Stop reason: SDUPTHER

## 2020-11-04 RX ORDER — AMITRIPTYLINE HYDROCHLORIDE 100 MG/1
100 TABLET, FILM COATED ORAL NIGHTLY
Qty: 90 TABLET | Refills: 1 | Status: CANCELLED | OUTPATIENT
Start: 2020-11-04

## 2020-11-04 RX ORDER — PRAVASTATIN SODIUM 40 MG
TABLET ORAL
Qty: 90 TABLET | Refills: 1 | Status: CANCELLED | OUTPATIENT
Start: 2020-11-04

## 2020-11-04 ASSESSMENT — ENCOUNTER SYMPTOMS
BLOOD IN STOOL: 0
DIARRHEA: 0
SORE THROAT: 0
NAUSEA: 0
ABDOMINAL PAIN: 0
WHEEZING: 0
SHORTNESS OF BREATH: 0
COUGH: 0
VOMITING: 0
SINUS PAIN: 0
BACK PAIN: 0
CONSTIPATION: 0

## 2020-11-04 ASSESSMENT — PATIENT HEALTH QUESTIONNAIRE - PHQ9
9. THOUGHTS THAT YOU WOULD BE BETTER OFF DEAD, OR OF HURTING YOURSELF: 0
7. TROUBLE CONCENTRATING ON THINGS, SUCH AS READING THE NEWSPAPER OR WATCHING TELEVISION: 1
SUM OF ALL RESPONSES TO PHQ9 QUESTIONS 1 & 2: 2
5. POOR APPETITE OR OVEREATING: 1
4. FEELING TIRED OR HAVING LITTLE ENERGY: 1
8. MOVING OR SPEAKING SO SLOWLY THAT OTHER PEOPLE COULD HAVE NOTICED. OR THE OPPOSITE, BEING SO FIGETY OR RESTLESS THAT YOU HAVE BEEN MOVING AROUND A LOT MORE THAN USUAL: 0
1. LITTLE INTEREST OR PLEASURE IN DOING THINGS: 1
3. TROUBLE FALLING OR STAYING ASLEEP: 0
6. FEELING BAD ABOUT YOURSELF - OR THAT YOU ARE A FAILURE OR HAVE LET YOURSELF OR YOUR FAMILY DOWN: 1
2. FEELING DOWN, DEPRESSED OR HOPELESS: 1
SUM OF ALL RESPONSES TO PHQ QUESTIONS 1-9: 6
10. IF YOU CHECKED OFF ANY PROBLEMS, HOW DIFFICULT HAVE THESE PROBLEMS MADE IT FOR YOU TO DO YOUR WORK, TAKE CARE OF THINGS AT HOME, OR GET ALONG WITH OTHER PEOPLE: 0

## 2020-11-04 NOTE — PROGRESS NOTES
Annual Exam Office Visit  11/4/2020    Subjective:  Chief Complaint   Patient presents with    Flu Vaccine     today     Annual Exam     refills     Depression     \"not too good\"    Hypertension     HPI:  Tomy Lopez is a 63 y.o. female who presents to the clinic today for annual exam.    T1DM- Has automatic blood sugar monitor. She follows with endocrine - Dr. Richard Sol episodes of hypoglycemia. Asymptomatic.      HTN- using lifestyle modification. Pt reports that she was taking losartan for kidneys. Stopped d/t cough- approved by endocrinology. BP controlled today without medications. Takes BP at home and states it is well controlled. Asymptomatic. No chest pain, palpitations, shortness of breath, trouble breathing, lightheadedness, dizziness or blurred vision. Depression/PTSD/Anxiety-Life stressors are multiple per pt. Pt is taking Zoloft 100 mg daily and amitriptyline 100 mg nightly. States her mood is \"back and forth. \" She states that she has good days and bad days. More good days than bad per pt. Seeing psychology-states this is helping. Denies SI/HI. Not interested in changing medication regimen. HLD- Allergy to atorvastatin- swelling reported \"all over my body. \" Took pravastatin without concerns (and without side effects).     Genital herpes- Pt reports that she takes acyclovir 400 mg once nightly- prescribed for BID. She states she increases the dose with a flare- no recent flare.     Vit D deficiency- Pt reports that she takes these supplements daily. Asymptomatic. Had labs on 09/2020 with endocrine and her dose was increased.     Allergic rhinitis- well controlled with flonase. Would like a refill.     Right shoulder pains- She also reports chronic shoulder pains that have been present for years.  No injury/trauma. 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 Stable.  Referred to ortho and had a cortisone injection. Ortho had Xrays completed. Takes flexeril PRN with relief. Review of Systems   Constitutional: Negative for chills, fatigue, fever and unexpected weight change. HENT: Negative for congestion, postnasal drip, sinus pain, sore throat and tinnitus. Respiratory: Negative for cough, shortness of breath and wheezing. Cardiovascular: Negative for chest pain, palpitations and leg swelling. Gastrointestinal: Negative for abdominal pain, blood in stool, constipation, diarrhea, nausea and vomiting. Genitourinary: Negative for dysuria, frequency, hematuria and urgency. Musculoskeletal: Negative for back pain, gait problem, myalgias and neck pain. Skin: Negative for pallor and rash. Neurological: Negative for dizziness, weakness, light-headedness, numbness and headaches. Psychiatric/Behavioral: Negative for behavioral problems, confusion, dysphoric mood, sleep disturbance and suicidal ideas. The patient is not nervous/anxious.       Allergies   Allergen Reactions    Atorvastatin Swelling    Lisinopril Other (See Comments)    Naproxen Nausea Only    Oxycodone-Acetaminophen Other (See Comments)     Family History   Problem Relation Age of Onset    Cirrhosis Mother         of liver    Lung Cancer Paternal Grandfather     Other Father         blood clot- pt unsure of where clot was    Breast Cancer Neg Hx     Ovarian Cancer Neg Hx     Heart Attack Neg Hx     Stroke Neg Hx      Current Outpatient Rx   Medication Sig Dispense Refill    Cholecalciferol (VITAMIN D3) 25 MCG (1000 UT) CAPS Take 2 capsules by mouth daily 180 capsule 0    acyclovir (ZOVIRAX) 200 MG capsule Take 2 capsules by mouth 2 times daily 180 capsule 0    amitriptyline (ELAVIL) 100 MG tablet Take 1 tablet by mouth nightly 90 tablet 0    fluticasone (FLONASE) 50 MCG/ACT nasal spray 1 spray by Each Nostril route daily 1 Bottle 2    sertraline (ZOLOFT) 100 MG tablet TAKE 1 TABLET BY MOUTH ONCE DAILY 90 tablet 0    pravastatin (PRAVACHOL) 40 MG tablet Take 1 tablet by mouth once daily 90 tablet 0    cyclobenzaprine (FLEXERIL) 5 MG tablet TAKE 1 TABLET BY MOUTH NIGHTLY AS NEEDED FOR MUSCLE SPASM 30 tablet 0    insulin glargine (LANTUS) 100 UNIT/ML injection vial Inject 50 Units into the skin every morning      Calcium-Vitamins C & D (CALCIUM/C/D PO) Take by mouth      Multiple Vitamins-Minerals (MULTI-AFIA PO) Take by mouth      Glucosamine-MSM-Hyaluronic Acd (JOINT HEALTH PO) Take by mouth      aspirin 81 MG tablet Take 81 mg by mouth daily      insulin lispro (HUMALOG) 100 UNIT/ML injection vial Inject 15 Units into the skin 3 times daily (before meals) Indications: with sliding scale Sliding scal   <200 = 0 units   221-250 = 1 unit  251-300 = 2 units   301 - 350 = 3 units   351-400= 4 units   401-430 = 5 units      brimonidine (ALPHAGAN) 0.2 % ophthalmic solution Place 1 drop into both eyes every 12 hours      prednisoLONE acetate (PRED FORTE) 1 % ophthalmic suspension Place 4 drops into both eyes 4 times daily       Social History     Socioeconomic History    Marital status: Legally      Spouse name: Not on file    Number of children: Not on file    Years of education: Not on file    Highest education level: Not on file   Occupational History    Not on file   Social Needs    Financial resource strain: Not hard at all   [place] insecurity     Worry: Never true     Inability: Never true   [employer] needs     Medical: No     Non-medical: No   Tobacco Use    Smoking status: Never Smoker    Smokeless tobacco: Never Used   Substance and Sexual Activity    Alcohol use: Not Currently     Frequency: Never    Drug use: Never    Sexual activity: Not Currently   Lifestyle    Physical activity     Days per week: Not on file     Minutes per session: Not on file    Stress: Not on file   Relationships    Social connections     Talks on phone: Not on file     Gets together: Not on file     Attends Zoroastrian service: Not on file     Active member of club or organization: Not on file     Attends meetings of clubs or organizations: Not on file     Relationship status: Not on file    Intimate partner violence     Fear of current or ex partner: Not on file     Emotionally abused: Not on file     Physically abused: Not on file     Forced sexual activity: Not on file   Other Topics Concern    Not on file   Social History Narrative    Moved from [place] to [place]. Has a daughter, son-in-law and grandchildren live with her. Past Medical History:   Diagnosis Date    Anxiety     Depression     Diabetes mellitus ([address]     Genital herpes     Hyperlipidemia     Hypertension     PTSD (post-traumatic stress disorder)     Vitamin D deficiency      Patient Active Problem List   Diagnosis    Anxiety and depression    PTSD (post-traumatic stress disorder)    Type 2 diabetes mellitus without complication, with long-term current use of insulin ([address]    Essential hypertension    Genital herpes simplex    Hyperlipidemia      Wt Readings from Last 3 Encounters:   11/04/20 156 lb (70.8 kg)   02/06/20 158 lb (71.7 kg)   11/22/19 156 lb (70.8 kg)     BP Readings from Last 3 Encounters:   11/04/20 130/86   05/06/20 119/77   02/06/20 (!) 107/59     The 10-year ASCVD risk score (Ashley Giles, et al., 2013) is: 8.9%    Values used to calculate the score:      Age: 63 years      Sex: Female      Is Non- : No      Diabetic: Yes      Tobacco smoker: No      Systolic Blood Pressure: 775 mmHg      Is BP treated: No      HDL Cholesterol: 58 mg/dL      Total Cholesterol: 212 mg/dL    PHQ-9 Total Score: 6 (11/4/2020  2:55 PM)  Thoughts that you would be better off dead, or of hurting yourself in some way: 0 (11/4/2020  2:55 PM)    Objective/Physical Exam:  /86   Pulse 95   Temp 97.9 °F (36.6 °C) (Temporal)   Wt 156 lb (70.8 kg)   SpO2 98%   BMI 32.60 kg/m²   Body mass index is 32.6 kg/m². Physical Exam  Vitals signs reviewed.    Constitutional: hyperlipidemia  -    Stable on 9/1/2020 labs in care everywhere. - Lifestyle modifications such as exercise, weight loss and healthy diet encouraged and reviewed with the pt. - Continue current regimen  - pravastatin (PRAVACHOL) 40 MG tablet; Take 1 tablet by mouth once daily-refilled today    Chronic right shoulder pain   - Patient reports this is still occurring. Recommended seeing orthopedic again- pt agreeable. PTSD (post-traumatic stress disorder)/Anxiety and depression  -     Patient reports she is not interested in making any changes to her medication regimen at this time. Denies suicidal or homicidal ideation.  - Continue with psychology  - amitriptyline (ELAVIL) 100 MG tablet; Take 1 tablet by mouth nightly-refilled today  -     sertraline (ZOLOFT) 100 MG tablet; TAKE 1 TABLET BY MOUTH ONCE DAILY-refilled today    Vitamin D deficiency  -    Increased dose recently since September labs showed low vitamin D. Asymptomatic. Will monitor.  - Cholecalciferol (VITAMIN D3) 25 MCG (1000 UT) CAPS; Take 2 capsules by mouth daily- increased dose. Genital herpes simplex, unspecified site  -    No recent flares. Continue current regimen  - acyclovir (ZOVIRAX) 200 MG capsule; Take 2 capsules by mouth 2 times daily-refilled today    Seasonal allergic rhinitis due to other allergic trigger  -     Well-controlled on the current regimen.  - fluticasone (FLONASE) 50 MCG/ACT nasal spray; 1 spray by Each Nostril route daily- refilled today    Type 1 diabetes mellitus without complication ([facility]  -     Continue with endocrine.  - MICROALBUMIN / CREATININE URINE RATIO; Future  -     Diabetic Foot Exam- see physical exam.    Long toenail  -    Long toenails on physical exam.  Patient reports she cannot cut them herself. Not fungal in appearance. - Patient would like referred to podiatry.   - Amb External Referral To Podiatry    Encounter for screening mammogram for malignant neoplasm of breast  -     FELIX RAIN DIGITAL DIAGNOSTIC BILATERAL; Future    Return in about 3 months (around 2/4/2021) for HTN/mood/HLD f/u, or sooner if needed. Pt will call if symptoms worsen or fail to improve. All questions answered. Pt states no further questions or concerns at this time.    Electronically signed by: Dorrie Schlatter 11/04/20

## 2020-11-06 ENCOUNTER — VIRTUAL VISIT (OUTPATIENT)
Dept: PSYCHOLOGY | Age: 65
End: 2020-11-06
Payer: MEDICAID

## 2020-11-06 PROCEDURE — 90832 PSYTX W PT 30 MINUTES: CPT | Performed by: PSYCHOLOGIST

## 2020-11-06 NOTE — PROGRESS NOTES
TELEHEALTH VISIT -- Audio/Visual (During WDDFN-90 public health emergency)  }  Pursuant to the emergency declaration under the [address] [place] [address], [facility] waiver authority and the [employer] Act, this Virtual Visit was conducted, with patient's consent, to reduce the patient's risk of exposure to COVID-19 and provide continuity of care for an established patient. Services were provided through a video synchronous discussion virtually to substitute for in-person clinic visit. Pt gave verbal informed consent to participate in telehealth services. Conducted a risk-benefit analysis and determined that the patient's presenting problems are consistent with the use of telepsychology. Determined that the patient has sufficient knowledge and skills in the use of technology enabling them to adequately benefit from telepsychology. It was determined that this patient was able to be properly treated without an in-person session. Patient verified that they were currently located at the [facility] address that was provided during registration or another [facility] address, if noted below. Verified the following information:  Patient's identification: Yes  Patient location: [address] [place] [address]   Patient's call back number: [phone]   Patient's emergency contact's name and number, as well as permission to contact them if needed: Extended Emergency Contact Information  Primary Emergency Contact: Steven Stroud [address] Phone: [phone]  Relation: Child     Provider location: [facility]  Dm Aquino, Ph.D.  Psychologist  11/6/2020  9:09 AM      Time spent with Patient: 30 minutes  This is patient's third  [facility] appointment. Reason for Consult:    Chief Complaint   Patient presents with    Anxiety       Feedback given to PCP. S:  Pt seen for f/u of PTSD.  Pt reported generally stable mood and sxs. Discussed social support, continues to talk to best friend 2-3x/wk. Discussed guilt and cognitive distortions r/t that. Reviewed insomnia. W medication, sleep onset is brief. Goes to bed around 1-2am and wakes 9 or 10 am.     Did sell her house in [place], which is \"[place]. \" Stated she did didn't process emotions while around family, but did engage in grief later. Noted she has not yet grieved the death of her mother since it occurred at the same time as kori w  and doesn't believe she grieved death of her father the year prior bc she was focused on caring for mom.      O:  MSE:    Appearance    alert, cooperative, crying  Appetite normal  Sleep disturbance No  Fatigue No  Loss of pleasure No  Impulsive behavior No  Speech    spontaneous, normal rate, normal volume and well articulated  Mood    Depressed  Affect    depressed affect  Thought Content    intact and cognitive distortions  Thought Process    goal directed and coherent  Associations    logical connections  Insight    Fair  Judgment    Fair  Orientation    oriented to person, place, time, and general circumstances  Memory    recent and remote memory intact  Attention/Concentration    intact  Morbid ideation No  Suicide Assessment    no suicidal ideation    History:  Social History:   Social History     Socioeconomic History    Marital status: Legally      Spouse name: Not on file    Number of children: Not on file    Years of education: Not on file    Highest education level: Not on file   Occupational History    Not on file   Social Needs    Financial resource strain: Not hard at all   [employer]iy[employer] insecurity     Worry: Never true     Inability: Never true   [employer] needs     Medical: No     Non-medical: No   Tobacco Use    Smoking status: Never Smoker    Smokeless tobacco: Never Used   Substance and Sexual Activity    Alcohol use: Not Currently     Frequency: Never    Drug use: Never    Sexual activity: Not Currently   Lifestyle    Physical activity     Days per week: Not on file     Minutes per session: Not on file    Stress: Not on file   Relationships    Social connections     Talks on phone: Not on file     Gets together: Not on file     Attends Jain service: Not on file     Active member of club or organization: Not on file     Attends meetings of clubs or organizations: Not on file     Relationship status: Not on file    Intimate partner violence     Fear of current or ex partner: Not on file     Emotionally abused: Not on file     Physically abused: Not on file     Forced sexual activity: Not on file   Other Topics Concern    Not on file   Social History Narrative    Moved from [place] to [place]. Has a daughter, son-in-law and grandchildren live with her. TOBACCO:   reports that she has never smoked. She has never used smokeless tobacco.  ETOH:   reports previous alcohol use. Diagnosis:  1. PTSD (post-traumatic stress disorder)          Plan:  Pt interventions:  Trained in strategies for increasing balanced thinking, Supportive techniques and Identified maladaptive thoughts        Documentation was done using voice recognition dragon software. Every effort was made to ensure accuracy; however, inadvertent, unintentional computerized transcription errors may be present.

## 2020-11-19 ENCOUNTER — TELEPHONE (OUTPATIENT)
Dept: INTERNAL MEDICINE CLINIC | Age: 65
End: 2020-11-19

## 2020-11-19 NOTE — TELEPHONE ENCOUNTER
[facility]. I have discussed this with pt multiple times- She states that she has taken the amitriptyline since the 1980s and Zoloft for over 5-6 years. Tamie Anthony would like to continue and she will monitor for side effects. Aware of interaction.

## 2020-11-19 NOTE — TELEPHONE ENCOUNTER
[address] called requesting a call back states pt is taking Sertraline and questioning if you want her to take Amitriptyline also.

## 2020-12-04 RX ORDER — CYCLOBENZAPRINE HCL 5 MG
TABLET ORAL
Qty: 30 TABLET | Refills: 0 | Status: SHIPPED | OUTPATIENT
Start: 2020-12-04 | End: 2021-01-04

## 2020-12-07 ENCOUNTER — TELEPHONE (OUTPATIENT)
Dept: INTERNAL MEDICINE CLINIC | Age: 65
End: 2020-12-07

## 2020-12-07 NOTE — TELEPHONE ENCOUNTER
----- Message from Chandler Resendez sent at 12/7/2020 12:19 PM EST -----  Subject: Message to Provider    QUESTIONS  Information for Provider? ##CHERI Stokes## Patient fell about 10 days ago   and her ring finger on left hand is hurting. She's not sure if she needs   to be referred to a specialist   jammed and swollen. ---------------------------------------------------------------------------  --------------  Jeffry MARTI  What is the best way for the office to contact you? OK to leave message on   voicemail  Preferred Call Back Phone Number? [phone]  ---------------------------------------------------------------------------  --------------  SCRIPT ANSWERS  Relationship to Patient?  Self

## 2020-12-08 ENCOUNTER — NURSE TRIAGE (OUTPATIENT)
Dept: OTHER | Facility: CLINIC | Age: 65
End: 2020-12-08

## 2020-12-08 ENCOUNTER — HOSPITAL ENCOUNTER (OUTPATIENT)
Age: 65
Discharge: HOME | End: 2020-12-08
Payer: MEDICAID

## 2020-12-08 ENCOUNTER — HOSPITAL ENCOUNTER (OUTPATIENT)
Dept: GENERAL RADIOLOGY | Age: 65
Discharge: HOME | End: 2020-12-08
Payer: MEDICAID

## 2020-12-08 ENCOUNTER — OFFICE VISIT (OUTPATIENT)
Dept: INTERNAL MEDICINE CLINIC | Age: 65
End: 2020-12-08
Payer: MEDICAID

## 2020-12-08 VITALS
BODY MASS INDEX: 33.02 KG/M2 | TEMPERATURE: 97.3 F | HEART RATE: 82 BPM | WEIGHT: 158 LBS | SYSTOLIC BLOOD PRESSURE: 138 MMHG | DIASTOLIC BLOOD PRESSURE: 78 MMHG

## 2020-12-08 PROCEDURE — 99213 OFFICE O/P EST LOW 20 MIN: CPT | Performed by: NURSE PRACTITIONER

## 2020-12-08 PROCEDURE — 72100 X-RAY EXAM L-S SPINE 2/3 VWS: CPT

## 2020-12-08 PROCEDURE — 1036F TOBACCO NON-USER: CPT | Performed by: NURSE PRACTITIONER

## 2020-12-08 PROCEDURE — 73140 X-RAY EXAM OF FINGER(S): CPT

## 2020-12-08 PROCEDURE — G8427 DOCREV CUR MEDS BY ELIG CLIN: HCPCS | Performed by: NURSE PRACTITIONER

## 2020-12-08 PROCEDURE — G8482 FLU IMMUNIZE ORDER/ADMIN: HCPCS | Performed by: NURSE PRACTITIONER

## 2020-12-08 PROCEDURE — 3017F COLORECTAL CA SCREEN DOC REV: CPT | Performed by: NURSE PRACTITIONER

## 2020-12-08 PROCEDURE — G8417 CALC BMI ABV UP PARAM F/U: HCPCS | Performed by: NURSE PRACTITIONER

## 2020-12-08 NOTE — PROGRESS NOTES
SUBJECTIVE:    Patient ID: Ernestina Elizalde is a 63 y.o. female. CC: finger injury    HPI: The patient presents to the office for an acute visit. She fell over thanksgiving and injured her left ring finger and lower back. She has decreased ROM and swelling. End of the finger won't straighten. She had past trigger finger surgery on that finger. She placed finger in brace and iced it which helped by not curing. She took advil and arthritis cream as well. She also injured her back with the fall. This worsened from previous fall. No radiation of pain. Pain worse with laying down, standing. Pain improves with sitting in certain positions. She reports no improvement with advil. She is using heat and ice. Right strain leg raise.       Current Outpatient Medications   Medication Sig Dispense Refill    cyclobenzaprine (FLEXERIL) 5 MG tablet TAKE 1 TABLET BY MOUTH NIGHTLY AS NEEDED FOR MUSCLE SPASM 30 tablet 0    Cholecalciferol (VITAMIN D3) 25 MCG (1000 UT) CAPS Take 2 capsules by mouth daily 180 capsule 0    acyclovir (ZOVIRAX) 200 MG capsule Take 2 capsules by mouth 2 times daily 180 capsule 0    amitriptyline (ELAVIL) 100 MG tablet Take 1 tablet by mouth nightly 90 tablet 0    fluticasone (FLONASE) 50 MCG/ACT nasal spray 1 spray by Each Nostril route daily 1 Bottle 2    sertraline (ZOLOFT) 100 MG tablet TAKE 1 TABLET BY MOUTH ONCE DAILY 90 tablet 0    pravastatin (PRAVACHOL) 40 MG tablet Take 1 tablet by mouth once daily 90 tablet 0    insulin glargine (LANTUS) 100 UNIT/ML injection vial Inject 50 Units into the skin every morning      brimonidine (ALPHAGAN) 0.2 % ophthalmic solution Place 1 drop into both eyes every 12 hours      prednisoLONE acetate (PRED FORTE) 1 % ophthalmic suspension Place 4 drops into both eyes 4 times daily      Calcium-Vitamins C & D (CALCIUM/C/D PO) Take by mouth      Multiple Vitamins-Minerals (MULTI-AFIA PO) Take by mouth      Glucosamine-MSM-Hyaluronic Acd brace, ice  - Offered referral to hand specialist if needed  - She wants XR which is reasonable given trauma but doubt will be helpful with mallet finger    Injury of left ring finger, initial encounter  -     XR FINGER LEFT (MIN 2 VIEWS)    Lumbar pain  -     XR LUMBAR SPINE (2-3 VIEWS)    Fall in home, initial encounter  -     XR FINGER LEFT (MIN 2 VIEWS)  -     XR LUMBAR SPINE (2-3 VIEWS)      BHAVANA Singh - CNP

## 2020-12-08 NOTE — TELEPHONE ENCOUNTER
Patient called pre-service center [facility]) [place] with red flag complaint. Brief description of triage: finger injury    Triage indicates for patient to be seen today    Care advice provided, patient verbalizes understanding; denies any other questions or concerns; instructed to call back for any new or worsening symptoms. Writer provided warm transfer to Hailee Rolle at [facility] for appointment scheduling. Attention Provider: Thank you for allowing me to participate in the care of your patient. The patient was connected to triage in response to information provided to the [facility]. Please do not respond through this encounter as the response is not directed to a shared pool. Reason for Disposition   Finger joint can't be opened (straightened) or closed (bent) completely    Answer Assessment - Initial Assessment Questions  1. MECHANISM: \"How did the injury happen? \"       Jordin Pompa on thanksgiving day, finger got caught on couch as she was falling. 2. ONSET: \"When did the injury happen? \" (Minutes or hours ago)       Thanksgiving day    3. LOCATION: \"What part of the finger is injured? \" \"Is the nail damaged? \"       Ring finger of left hand    4. APPEARANCE of the INJURY: \"What does the injury look like? \"       Whole finger is swollen    5. SEVERITY: \"Can you use the hand normally? \"  \"Can you bend your fingers into a ball and then fully open them? \"      No    6. SIZE: For cuts, bruises, or swelling, ask: \"How large is it? \" (e.g., inches or centimeters;  entire finger)       None    7. PAIN: \"Is there pain? \" If so, ask: \"How bad is the pain? \"    (e.g., Scale 1-10; or mild, moderate, severe)      No    8. TETANUS: For any breaks in the skin, ask: \"When was the last tetanus booster? \"      N/A    9. OTHER SYMPTOMS: \"Do you have any other symptoms? \"      Back pain from same fall    10. PREGNANCY: \"Is there any chance you are pregnant? \" \"When was your last menstrual period? \"        N/A    Protocols used: FINGER INJURY-ADULT-[place]

## 2020-12-09 ENCOUNTER — TELEPHONE (OUTPATIENT)
Dept: INTERNAL MEDICINE CLINIC | Age: 65
End: 2020-12-09

## 2020-12-09 ENCOUNTER — VIRTUAL VISIT (OUTPATIENT)
Dept: PSYCHOLOGY | Age: 65
End: 2020-12-09
Payer: MEDICAID

## 2020-12-09 PROCEDURE — 90832 PSYTX W PT 30 MINUTES: CPT | Performed by: PSYCHOLOGIST

## 2020-12-09 NOTE — TELEPHONE ENCOUNTER
Just BELEMI- Spoke with pt she was aware that she already had the XR of her back she was just calling for the results. Pt given the results. Voiced understanding.

## 2020-12-09 NOTE — PROGRESS NOTES
TELEHEALTH VISIT -- Audio/Visual (During LQCHW-36 public health emergency)  }  Pursuant to the emergency declaration under the [address] [place] [place] [address], [facility] authority and the [employer] Act, this Virtual Visit was conducted, with patient's consent, to reduce the patient's risk of exposure to COVID-19 and provide continuity of care for an established patient. Services were provided through a video synchronous discussion virtually to substitute for in-person clinic visit. Pt gave verbal informed consent to participate in telehealth services. Conducted a risk-benefit analysis and determined that the patient's presenting problems are consistent with the use of telepsychology. Determined that the patient has sufficient knowledge and skills in the use of technology enabling them to adequately benefit from telepsychology. It was determined that this patient was able to be properly treated without an in-person session. Patient verified that they were currently located at the [facility] address that was provided during registration or another [facility] address, if noted below. Verified the following information:  Patient's identification: Yes  Patient location: [address] [place] [address]   Patient's call back number: [phone]   Patient's emergency contact's name and number, as well as permission to contact them if needed: Extended Emergency Contact Information  Primary Emergency Contact: Dayna Irene [address] Phone: [phone]  Relation: Child     Provider location: [place], [address] Consultation  Roe Soto, Ph.D.  Psychologist  12/9/2020  2:34 PM      Time spent with Patient: 30 minutes  This is patient's fourth  [facility] appointment. Reason for Consult:    Chief Complaint   Patient presents with    Anxiety       Feedback given to PCP.     S:

## 2020-12-09 NOTE — TELEPHONE ENCOUNTER
----- Message from Johnnie Elaine sent at 12/9/2020 11:01 AM EST -----  Subject: Message to Provider    QUESTIONS  Information for Provider? Patient called and has more questions for the   person who called her today about her x-ray results. [employer] rep called office   twice and was disconnected twice- possible phone issues still. Pt does not   want message left. She actually needs to speak to someone  ---------------------------------------------------------------------------  --------------  CALL BACK INFO  What is the best way for the office to contact you? Do not leave any   message   patient will call back for answer  Preferred Call Back Phone Number? [phone]  ---------------------------------------------------------------------------  --------------  SCRIPT ANSWERS  Relationship to Patient?  Self

## 2020-12-10 ASSESSMENT — ENCOUNTER SYMPTOMS: BACK PAIN: 1

## 2020-12-18 ENCOUNTER — NURSE TRIAGE (OUTPATIENT)
Dept: OTHER | Facility: CLINIC | Age: 65
End: 2020-12-18

## 2020-12-18 NOTE — TELEPHONE ENCOUNTER
[facility] Care Advice provided; patient acknowledged understanding of care advice and is in agreement with plan. Patient has no further questions; instructed to call back for any new or worsening symptoms. Call soft transferred to [address] [place] [address] to schedule appointment. Reason for Disposition   Injury to the tailbone   [1] High-risk adult (e.g., age > 61, osteoporosis, chronic steroid use) AND [2] still hurts    Answer Assessment - Initial Assessment Questions  1. MECHANISM: \"How did the injury happen? \" (Consider the possibility of domestic violence or elder abuse)   Mercedes Bauman on Thanksgiving and injured lower back and left ring finger         2. ONSET: \"When did the injury happen? \" (Minutes or hours ago)     ~3 weeks     3. LOCATION: \"What part of the back is injured? \"   Across lower back, worse in middle and on right         4. SEVERITY: \"Can you move the back normally? \"  Can move normally but not for very long      5. PAIN: \"Is there any pain? \" If so, ask: \"How bad is the pain? \"   (Scale 1-10; or mild, moderate, severe)   8       6. CORD SYMPTOMS: Any weakness or numbness of the arms or legs? \"   Denies, reports weakness in back       7. SIZE: For cuts, bruises, or swelling, ask: \"How large is it? \" (e.g., inches or centimeters)   Previously reported bruising, unsure if it is still present        8. TETANUS: For any breaks in the skin, ask: \"When was the last tetanus booster? \"   Did not break skin        9. OTHER SYMPTOMS: \"Do you have any other symptoms? \" (e.g., abdominal pain, blood in urine)   Denies       10. PREGNANCY: \"Is there any chance you are pregnant? \" \"When was your last menstrual period? \"     No    Protocols used: BACK INJURY-ADULT-, TAILBONE INJURY-ADULT-

## 2020-12-21 ENCOUNTER — OFFICE VISIT (OUTPATIENT)
Dept: INTERNAL MEDICINE CLINIC | Age: 65
End: 2020-12-21
Payer: MEDICAID

## 2020-12-21 VITALS
DIASTOLIC BLOOD PRESSURE: 60 MMHG | SYSTOLIC BLOOD PRESSURE: 132 MMHG | WEIGHT: 160 LBS | BODY MASS INDEX: 33.44 KG/M2 | HEART RATE: 102 BPM | TEMPERATURE: 96.1 F

## 2020-12-21 PROCEDURE — G8482 FLU IMMUNIZE ORDER/ADMIN: HCPCS | Performed by: NURSE PRACTITIONER

## 2020-12-21 PROCEDURE — 99213 OFFICE O/P EST LOW 20 MIN: CPT | Performed by: NURSE PRACTITIONER

## 2020-12-21 PROCEDURE — 1036F TOBACCO NON-USER: CPT | Performed by: NURSE PRACTITIONER

## 2020-12-21 PROCEDURE — G8417 CALC BMI ABV UP PARAM F/U: HCPCS | Performed by: NURSE PRACTITIONER

## 2020-12-21 PROCEDURE — G8427 DOCREV CUR MEDS BY ELIG CLIN: HCPCS | Performed by: NURSE PRACTITIONER

## 2020-12-21 PROCEDURE — 3017F COLORECTAL CA SCREEN DOC REV: CPT | Performed by: NURSE PRACTITIONER

## 2020-12-21 ASSESSMENT — ENCOUNTER SYMPTOMS: BACK PAIN: 1

## 2020-12-21 NOTE — PROGRESS NOTES
SUBJECTIVE:    Patient ID: José Antonio Dillon is a 63 y.o. female. CC: Finger pain, back pain    HPI: The patient presents the office today for follow-up of finger and back pain. She arrived 12 minutes late for a 20-minute appointment but was still seen. She previously reported left ring finger pain with no known injury or trauma. She had difficulty straightening the tip of the finger was found to have a mallet finger on the left. An x-ray showed arthritic changes but no other acute issues. She has been wearing a brace but reports worsening pain despite brace. She also reported back pain. An x-ray showed moderate arthritic changes of her back. There were no acute findings. Today, she reports her back pain is worsening. She has previously sought care from chiropractor but is agreeable to physical therapy.       Past Medical History:   Diagnosis Date    Anxiety     Depression     Diabetes mellitus (HCC)     Genital herpes     Hyperlipidemia     Hypertension     PTSD (post-traumatic stress disorder)     Vitamin D deficiency         Current Outpatient Medications   Medication Sig Dispense Refill    cyclobenzaprine (FLEXERIL) 5 MG tablet TAKE 1 TABLET BY MOUTH NIGHTLY AS NEEDED FOR MUSCLE SPASM 30 tablet 0    Cholecalciferol (VITAMIN D3) 25 MCG (1000 UT) CAPS Take 2 capsules by mouth daily 180 capsule 0    acyclovir (ZOVIRAX) 200 MG capsule Take 2 capsules by mouth 2 times daily 180 capsule 0    amitriptyline (ELAVIL) 100 MG tablet Take 1 tablet by mouth nightly 90 tablet 0    fluticasone (FLONASE) 50 MCG/ACT nasal spray 1 spray by Each Nostril route daily 1 Bottle 2    sertraline (ZOLOFT) 100 MG tablet TAKE 1 TABLET BY MOUTH ONCE DAILY 90 tablet 0    pravastatin (PRAVACHOL) 40 MG tablet Take 1 tablet by mouth once daily 90 tablet 0    insulin glargine (LANTUS) 100 UNIT/ML injection vial Inject 50 Units into the skin every morning

## 2020-12-22 ENCOUNTER — HOSPITAL ENCOUNTER (OUTPATIENT)
Dept: PHYSICAL THERAPY | Age: 65
Setting detail: THERAPIES SERIES
Discharge: HOME | End: 2020-12-22
Payer: MEDICAID

## 2020-12-22 PROCEDURE — 97162 PT EVAL MOD COMPLEX 30 MIN: CPT | Performed by: PHYSICAL THERAPIST

## 2020-12-22 PROCEDURE — 97161 PT EVAL LOW COMPLEX 20 MIN: CPT | Performed by: PHYSICAL THERAPIST

## 2020-12-22 NOTE — PLAN OF CARE
[place], [place] [address] [facility], [address] [address]  Phone: [phone]  Fax: [phone]     Physical Therapy Certification    Dear Referring Practitioner: BHAVANA Mendoza CNP,    We had the pleasure of evaluating the following patient for physical therapy services at [address] [place] [address]. A summary of our findings can be found in the initial assessment below. This includes our plan of care. If you have any questions or concerns regarding these findings, please do not hesitate to contact me at the office phone number checked above.   Thank you for the referral.       Physician Signature:_______________________________Date:__________________  By signing above (or electronic signature), therapists plan is approved by physician      Patient: Anita Casey   : 1955   MRN: [id number]  Referring Physician: Referring Practitioner: BHAVANA Mendoza CNP      Evaluation Date: 2020      Medical Diagnosis Information:  Diagnosis: M54.5  M47.816 Lx pain and Spondylosis                                             Insurance information: PT Insurance Information: [facility]  30V PCY     Precautions/ Contra-indications:     C-SSRS Triggered by Intake questionnaire (Past 2 wk assessment ):   [x] No, Questionnaire did not trigger screening.   [] Yes, Patient intake triggered C-SSRS Screening      [] C-SSRS Screening completed  [] PCP notified via Epic    Latex Allergy:  [x]NO      []YES  Preferred Language for Healthcare:   [x]English       []other: SUBJECTIVE: Patient stated complaint: Patient reports a fall in early November down 7 steps while at [employer]. Then on 11/26 she fell at home b/t couch and coffee table resulting in LBP and a torn tendon in her L 4th finger. R sided LBP has persisted. Pt states that L radicular s/s to her posterior knee onset yesterday. Pt is a type I DM but states it is controlled. She lives with her daughter and her family. She was referred by her PCP. 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 OBJECTIVE:     Palpation: tender at R PSIS    Functional Mobility/Transfers: [facility] sit-stand    Posture: decreased lx lordosis with mm guarding in Lx P-S  Inspection: no skin tears or warmth.  Pt is a small framed woman    Gait: (include devices/WB status) No AD, walks with equal step length, walks on the outside of her R foot     Bandages/Dressings/Incisions: NA    Dermatomes Normal Abnormal Comments   inguinal area (L1)  X     anterior mid-thigh (L2) X     distal ant thigh/med knee (L3) X     medial lower leg and foot (L4) X     lateral lower leg and foot (L5) X     posterior calf (S1) X     medial calcaneus (S2) X         Reflexes Normal Abnormal Comments   S1-2 Seated achilles      S1-2 Prone knee bend      L3-4 Patellar tendon X     Clonus      Babinski [x] Patient history, allergies, meds reviewed. Medical chart reviewed. See intake form. Review Of Systems (ROS):  [x]Performed Review of systems (Integumentary, CardioPulmonary, Neurological) by intake and observation. Intake form has been scanned into medical record. Patient has been instructed to contact their primary care physician regarding ROS issues if not already being addressed at this time. Co-morbidities/Complexities (which will affect course of rehabilitation):  []None           Arthritic conditions   []Rheumatoid arthritis (M05.9)  [x]Osteoarthritis (M19.91)   Cardiovascular conditions   []Hypertension (I10)  []Hyperlipidemia (E78.5)  []Angina pectoris (I20)  []Atherosclerosis (I70)   Musculoskeletal conditions   []Disc pathology   []Congenital spine pathologies   []Prior surgical intervention  []Osteoporosis (M81.8)  []Osteopenia (M85.8)   Endocrine conditions   []Hypothyroid (E03.9)  []Hyperthyroid Gastrointestinal conditions   []Constipation (V59.39)   Metabolic conditions   []Morbid obesity (E66.01)  [x]Diabetes type 1(E10.65) or 2 (E11.65)   []Neuropathy (G60.9)     Pulmonary conditions   []Asthma (J45)  []Coughing   []COPD (J44.9)   Psychological Disorders  [x]Anxiety (F41.9)  [x]Depression (F32.9)   []Other:   []Other:     X7 TRIGGER FINGER Sx  L ankle repair    bowel sx      Barriers to/and or personal factors that will affect rehab potential:              []Age  []Sex    []Smoker              []Motivation/Lack of Motivation                        [x]Co-Morbidities              []Cognitive Function, education/learning barriers              [x]Environmental, home barriers              []profession/work barriers  []past PT/medical experience  []other:  Justification:     Falls Risk Assessment (30 days):   [x] Falls Risk assessed and no intervention required.   [] Falls Risk assessed and Patient requires intervention due to being higher risk   TUG score (>12s at risk): [] Falls education provided, including         ASSESSMENT:   Functional Impairments:     [x]Noted lumbar/proximal hip hypomobility   []Noted lumbosacral and/or generalized hypermobility   [x]Decreased Lumbosacral/hip/LE functional ROM   [x]Decreased core/proximal hip strength and neuromuscular control    []Decreased LE functional strength    []Abnormal reflexes/sensation/myotomal/dermatomal deficits  []Reduced balance/proprioceptive control    []other:      Functional Activity Limitations (from functional questionnaire and intake)   [x]Reduced ability to tolerate prolonged functional positions   [x]Reduced ability or difficulty with changes of positions or transfers between positions   [x]Reduced ability to maintain good posture and demonstrate good body mechanics with sitting, bending, and lifting   [x]Reduced ability to sleep   [] Reduced ability or tolerance with driving and/or computer work   []Reduced ability to perform lifting, reaching, carrying tasks   [x]Reduced ability to squat   [x]Reduced ability to forward bend   [x]Reduced ability to ambulate prolonged functional periods/distances/surfaces   [x]Reduced ability to ascend/descend stairs   []other:       Participation Restrictions   [x]Reduced participation in self care activities   [x]Reduced participation in home management activities   []Reduced participation in work activities   [x]Reduced participation in social activities. []Reduced participation in sport/recreational activities. Classification:   [x]Signs/symptoms consistent with Lumbar instability/stabilization subgroup. []Signs/symptoms consistent with Lumbar mobilization/manipulation subgroup, myotomes and dermatomes intact. Meets manipulation criteria.     []Signs/symptoms consistent with Lumbar direction specific/centralization subgroup   []Signs/symptoms consistent with Lumbar traction subgroup     []Signs/symptoms consistent with lumbar facet dysfunction []Signs/symptoms consistent with lumbar stenosis type dysfunction   []Signs/symptoms consistent with nerve root involvement including myotome & dermatome dysfunction   []Signs/symptoms consistent with post-surgical status including: decreased ROM, strength and function. []signs/symptoms consistent with pathology which may benefit from Dry needling     []other:      Prognosis/Rehab Potential:      []Excellent   [x]Good    []Fair   []Poor    Tolerance of evaluation/treatment:    []Excellent   [x]Good    []Fair   []Poor     Physical Therapy Evaluation Complexity Justification  [x] A history of present problem with:  [] no personal factors and/or comorbidities that impact the plan of care;  []1-2 personal factors and/or comorbidities that impact the plan of care  [x]3 personal factors and/or comorbidities that impact the plan of care  [x] An examination of body systems using standardized tests and measures addressing any of the following: body structures and functions (impairments), activity limitations, and/or participation restrictions;:  [] a total of 1-2 or more elements   [x] a total of 3 or more elements   [] a total of 4 or more elements   [x] A clinical presentation with:  [x] stable and/or uncomplicated characteristics   [] evolving clinical presentation with changing characteristics  [] unstable and unpredictable characteristics;   [x] Clinical decision making of [] low, [x] moderate, [] high complexity using standardized patient assessment instrument and/or measurable assessment of functional outcome.     [] EVAL (LOW) 81064 (typically 15 minutes face-to-face)  [x] EVAL (MOD) 73203 (typically 30 minutes face-to-face)  [] EVAL (HIGH) 73802 (typically 45 minutes face-to-face)  [] RE-EVAL     PLAN: Begin PT focusing on: proximal hip mobilizations, LB mobs, LB core activation, proximal hip activation, and HEP    Frequency/Duration:  2 days per week for 6 Weeks:  Interventions: [x]  Therapeutic exercise including: strength training, ROM, for LE, Glutes and core   [x]  NMR activation and proprioception for glutes , LE and Core   [x]  Manual therapy as indicated for Hip complex, LE and spine to include: Dry Needling/IASTM, STM, PROM, Gr I-IV mobilizations, manipulation. [x]  Modalities as needed that may include: thermal agents, E-stim, Biofeedback, US, iontophoresis as indicated  [x]  Patient education on joint protection, postural re-education, activity modification, progression of HEP. HEP instruction: Written HEP instructions provided and reviewed     GOALS:  Patient stated goal:  Decreased pain  [] Progressing: [] Met: [] Not Met: [] Adjusted    Therapist goals for Patient:   Short Term Goals: To be achieved in: 2 weeks  1. Independent in HEP and progression per patient tolerance, in order to prevent re-injury. [] Progressing: [] Met: [] Not Met: [] Adjusted  2. Patient will have a decrease in pain to facilitate improvement in movement, function, and ADLs as indicated by Functional Deficits. [] Progressing: [] Met: [] Not Met: [] Adjusted    Long Term Goals: To be achieved in: 6weeks  1. Disability index score of 25% or less for the JONO to assist with reaching prior level of function. [] Progressing: [] Met: [] Not Met: [] Adjusted  2. Patient will demonstrate increased AROM to WNL, good LS mobility, good hip ROM to allow for proper joint functioning as indicated by patients Functional Deficits. [] Progressing: [] Met: [] Not Met: [] Adjusted  3. Patient will demonstrate an increase in Strength to good proximal hip and core activation to allow for proper functional mobility as indicated by patients Functional Deficits. [] Progressing: [] Met: [] Not Met: [] Adjusted  4. Patient will return to functional activities including sleeping and prolonged positions without increased symptoms or restriction.    [] Progressing: [] Met: [] Not Met: [] Adjusted 5. Patient will report 60% improvement in WB ADL tolerance and ease with turning in bed  [] Progressing: [] Met: [] Not Met: [] Adjusted     Electronically signed by:  LUKE Ortiz [id number]

## 2020-12-22 NOTE — FLOWSHEET NOTE
[place] , [place]  Phone: [phone]  Fax: [phone]    Physical Therapy Treatment Note/ Progress Report:     Date:  2020    Patient Name:  Nicolasa Serna    :  1955  MRN: [id number]  Restrictions/Precautions:    Medical/Treatment Diagnosis Information:  · Diagnosis: M54.5  M47.816 Lx pain and Spondylosis  Treatment Diagnosis: LBP, Lx strain after a fall  Insurance/Certification information:  PT Insurance Information: [facility]  Physician Information:  Referring Practitioner: BHAVANA Silva CNP  Plan of care signed (Y/N): []  Yes  [x]  No     Date of Patient follow up with Physician:      Progress Report: []  Yes  [x]  No     Date Range for reporting period:  Beginnin20  Ending:     Progress report due (10 Rx/or 30 days whichever is less):     Recertification due (POC duration/ or 90 days whichever is less):     Visit # Insurance Allowable Auth required? Date Range   1 30 []  Yes  []  No PCY     Latex Allergy:  [x]NO      []YES  Preferred Language for Healthcare:   [x]English       []other:    Functional Scale:      Date assessed:  Oswestry: raw score =19 ; dysfunction =42 %  20    Pain level: 8/10     SUBJECTIVE:  Patient stated complaint: Patient reports a fall in early November down 7 steps while at [employer]. Then on  she fell at home b/t couch and coffee table resulting in LBP and a torn tendon in her L 4th finger. R sided LBP has persisted. Pt states that L radicular s/s to her posterior knee onset yesterday. Pt is a type I DM but states it is controlled. She lives with her daughter and her family.  She was referred by her PCP.      OBJECTIVE: See eval  ? Observation:   ? Test measurements:      RESTRICTIONS/PRECAUTIONS:     Exercises/Interventions:     Therapeutic Exercise (32095) Resistance / level Sets / Seconds Reps Notes / Cues   bike       IB       HSS  seated  30\" 2                  Seated: Add set  TB hip ER  LAQ              Mat EX:  PPT  TA set  GS with add set  LTR  SKTC  Piriformis str  Clams       10  10  10  10  Add  add           Therapeutic Activities (30629)       Foam:  NBOS  Squat  Hip abd  HR  march                                   Neuromuscular Re-ed (79601)                                          Manual Intervention ([phone])       Prone PA       STM                                                          Pt. Education:  -pt educated on diagnosis, prognosis and expectations for rehab  -all pt questions were answered    Home Exercise Program:    Access Code: [email or web address]   URL: [email or web address]   Date: 12/22/2020   Prepared by: Reina Lewis     Exercises   Supine Posterior Pelvic Tilt - 10 reps - 3 hold - 1-2x daily - 7x weekly   Supine Lower Trunk Rotation - 10 reps - 1x daily - 7x weekly   Hooklying Gluteal Sets - 10 reps - 5 hold - 1x daily - 7x weekly   Supine Transversus Abdominis Bracing - Hands on Stomach - 10 reps - 5 hold - 1x daily - 7x weekly   Seated Hamstring Stretch - 2 reps - 20 hold - 1x daily - 7x weekly     Therapeutic Exercise and NMR EXR  [x] (74851) Provided verbal/tactile cueing for activities related to strengthening, flexibility, endurance, ROM  for improvements in proximal hip and core control with self care, mobility, lifting and ambulation.  [] (97902) Provided verbal/tactile cueing for activities related to improving balance, coordination, kinesthetic sense, posture, motor skill, proprioception  to assist with core control in self care, mobility, lifting, and ambulation.      Therapeutic Activities:    [] (46033 or 77278) Provided verbal/tactile cueing for activities related to improving balance, coordination, kinesthetic sense, posture, motor skill, proprioception and motor activation to allow for proper function  with self care and ADLs [] (33730) Provided training and instruction to the patient for proper core and proximal hip recruitment and positioning with ambulation re-education     Home Exercise Program:    [x] (96339) Reviewed/Progressed HEP activities related to strengthening, flexibility, endurance, ROM of core, proximal hip and LE for functional self-care, mobility, lifting and ambulation   [] (47356) Reviewed/Progressed HEP activities related to improving balance, coordination, kinesthetic sense, posture, motor skill, proprioception of core, proximal hip and LE for self care, mobility, lifting, and ambulation      Manual Treatments:  PROM / STM / Oscillations-Mobs:  G-I, II, III, IV (PA's, Inf., Post.)  [] (68346) Provided manual therapy to mobilize proximal hip and LS spine soft tissue/joints for the purpose of modulating pain, promoting relaxation,  increasing ROM, reducing/eliminating soft tissue swelling/inflammation/restriction, improving soft tissue extensibility and allowing for proper ROM for normal function with self care, mobility, lifting and ambulation. Modalities:       Charges:  Timed Code Treatment Minutes: 40   Total Treatment Minutes: 40       [] EVAL - LOW (19357)   [x] EVAL - MOD (27380)  [] EVAL - HIGH (24648)  [] RE-EVAL (29484)  [x] TE (12518) x 1     [] Ionto (57213)  [] NMR (20534) x      [] Vaso (68015)  [] Manual (77002) x      [] Ultrasound  [] TA (93135) x      [] Mech Traction (32483)  [] Gait Training x     [] ES (un) (20259):   [] Aquatic therapy x   [] Other:   [] Group:     Goals:   Patient stated goal:  Decreased pain  []? Progressing: []? Met: []? Not Met: []? Adjusted     Therapist goals for Patient:   Short Term Goals: To be achieved in: 2 weeks  1. Independent in HEP and progression per patient tolerance, in order to prevent re-injury. []? Progressing: []? Met: []? Not Met: []?  Adjusted 2. Patient will have a decrease in pain to facilitate improvement in movement, function, and ADLs as indicated by Functional Deficits. []? Progressing: []? Met: []? Not Met: []? Adjusted     Long Term Goals: To be achieved in: 6weeks  1. Disability index score of 25% or less for the JONO to assist with reaching prior level of function. []? Progressing: []? Met: []? Not Met: []? Adjusted  2. Patient will demonstrate increased AROM to WNL, good LS mobility, good hip ROM to allow for proper joint functioning as indicated by patients Functional Deficits. []? Progressing: []? Met: []? Not Met: []? Adjusted  3. Patient will demonstrate an increase in Strength to good proximal hip and core activation to allow for proper functional mobility as indicated by patients Functional Deficits. []? Progressing: []? Met: []? Not Met: []? Adjusted  4. Patient will return to functional activities including sleeping and prolonged positions without increased symptoms or restriction. []? Progressing: []? Met: []? Not Met: []? Adjusted  5. Patient will report 60% improvement in WB ADL tolerance and ease with turning in bed  []? Progressing: []? Met: []? Not Met: []? Adjusted          Overall Progression Towards Functional goals/ Treatment Progress Update:  [] Patient is progressing as expected towards functional goals listed. [] Progression is slowed due to complexities/Impairments listed. [] Progression has been slowed due to co-morbidities.   [x] Plan just implemented, too soon to assess goals progression <30days   [] Goals require adjustment due to lack of progress  [] Patient is not progressing as expected and requires additional follow up with physician  [] Other    Persisting Functional Limitations/Impairments:  [x]Sitting []Standing   [x]Walking [x]Squatting/bending    [x]Stairs [x]ADL's    [x]Transfers []Reaching  [x]Housework []Job related tasks  []Driving []Sports/Recreation   [x]Sleeping []Other: ASSESSMENT:  See eval  Treatment/Activity Tolerance:  [] Patient able to complete tx  [] Patient limited by fatique  [x] Patient limited by pain  [] Patient limited by other medical complications  [] Other:     Prognosis: [x] Good [] Fair  [] Poor    Patient Requires Follow-up: [x] Yes  [] No    Plan for next treatment session:    PLAN: See eval. PT 2x / week for 6 weeks. [] Continue per plan of care [] Alter current plan (see comments)  [x] Plan of care initiated [] Hold pending MD visit [] Discharge    Electronically signed by: Brandi Mendoza PT, MPT [id number]      Note: If patient does not return for scheduled/ recommended follow up visits, this note will serve as a discharge from care along with most recent update on progress.

## 2020-12-28 ENCOUNTER — HOSPITAL ENCOUNTER (OUTPATIENT)
Dept: PHYSICAL THERAPY | Age: 65
Setting detail: THERAPIES SERIES
Discharge: HOME | End: 2020-12-28
Payer: MEDICAID

## 2020-12-28 PROCEDURE — 97110 THERAPEUTIC EXERCISES: CPT | Performed by: PHYSICAL THERAPIST

## 2020-12-28 PROCEDURE — 97530 THERAPEUTIC ACTIVITIES: CPT | Performed by: PHYSICAL THERAPIST

## 2020-12-28 NOTE — FLOWSHEET NOTE
[facility] [place]  Phone: [phone]  Fax: [phone]    Physical Therapy Treatment Note/ Progress Report:     Date:  2020    Patient Name:  Preeti Schmitt    :  1955  MRN: [id number]  Restrictions/Precautions:    Medical/Treatment Diagnosis Information:  · Diagnosis: M54.5  M47.816 Lx pain and Spondylosis  Treatment Diagnosis: LBP, Lx strain after a fall  Insurance/Certification information:  PT [facility] Information[facility]  Physician Information:  Referring Practitioner: BHAVANA Hummel CNP  Plan of care signed (Y/N): []  Yes  [x]  No     Date of Patient follow up with Physician:      Progress Report: []  Yes  [x]  No     Date Range for reporting period:  Beginnin20  Ending:     Progress report due (10 Rx/or 30 days whichever is less):     Recertification due (POC duration/ or 90 days whichever is less):     Visit # Insurance Allowable Auth required? Date Range   2 30 []  Yes  []  No PCY     Latex Allergy:  [x]NO      []YES  Preferred Language for Healthcare:   [x]English       []other:    Functional Scale:      Date assessed:  Oswestry: raw score =19 ; dysfunction =42 %  20    Pain level: 8/10     Hx:  Patient stated complaint: Patient reports a fall in early November down 7 steps while at [employer]. Then on  she fell at home b/t couch and coffee table resulting in LBP and a torn tendon in her L 4th finger. R sided LBP has persisted. Pt states that L radicular s/s to her posterior knee onset yesterday. Pt is a type I DM but states it is controlled. She lives with her daughter and her family. She was referred by her PCP.      SUBJECTIVE: Pt is having a good day today - min pain.     OBJECTIVE: See eval  ? Observation:   ? Test measurements:      RESTRICTIONS/PRECAUTIONS:     Exercises/Interventions:     Therapeutic Exercise (31000) Resistance / level Sets / Seconds Reps Nishant / Mike Hunter Bike seat 1 with pillow 1.0mph 3'     IB  30\" 2    HSS  STANDING 6\" step 30\" 2                  Seated: Add set  TB hip ER  LAQ  L hip ER AROM     Green  1#   5\"   10  10  10 L/R  10       L more difficult  Pt unable to perform supine hip ER to cross ankle over knee           Mat EX:  PPT  TA set  GS with add set  LTR  SKTC  Piriformis str  Clams              10\"  10\"   10  10  10  10  2  2 R  add             L manual stretch by PT          Therapeutic Activities (96675)       Foam:  NBOS  Squat  Hip abd  HR  march 20\"   1x  10  10 L/R  10  10 L/R           CC:  multif   add                  Neuromuscular Re-ed (68180)                                          Manual Intervention (50603)       Prone PA       STM w roller to LS, L glut/hip   5' Pt in R [employer] = new ex         Pt. Education:  -pt educated on diagnosis, prognosis and expectations for rehab  -all pt questions were answered    Home Exercise Program:  Access Code: [email or web address]   URL: [email or web address]   Date: 12/28/2020   Prepared by: Scarlet Forbes     Exercises   Standing Hamstring Stretch with Step - 2 reps - 20 hold - 1x daily - 7x weekly   Seated Hip Abduction with Resistance - 10 reps - 1x daily - 7x weekly   Seated Hip Flexion and External Rotation - 10 reps - 1x daily - 7x weekly     Access Code: [email or web address]   URL: [email or web address]   Date: 12/22/2020   Prepared by: Scarlet Forbes     Exercises   Supine Posterior Pelvic Tilt - 10 reps - 3 hold - 1-2x daily - 7x weekly   Supine Lower Trunk Rotation - 10 reps - 1x daily - 7x weekly   Hooklying Gluteal Sets - 10 reps - 5 hold - 1x daily - 7x weekly   Supine Transversus Abdominis Bracing - Hands on Stomach - 10 reps - 5 hold - 1x daily - 7x weekly   Seated Hamstring Stretch - 2 reps - 20 hold - 1x daily - 7x weekly     Therapeutic Exercise and NMR EXR [x] (16207) Provided verbal/tactile cueing for activities related to strengthening, flexibility, endurance, ROM  for improvements in proximal hip and core control with self care, mobility, lifting and ambulation.  [] (15018) Provided verbal/tactile cueing for activities related to improving balance, coordination, kinesthetic sense, posture, motor skill, proprioception  to assist with core control in self care, mobility, lifting, and ambulation. Therapeutic Activities:    [] (26980 or 08149) Provided verbal/tactile cueing for activities related to improving balance, coordination, kinesthetic sense, posture, motor skill, proprioception and motor activation to allow for proper function  with self care and ADLs  [] (53230) Provided training and instruction to the patient for proper core and proximal hip recruitment and positioning with ambulation re-education     Home Exercise Program:    [x] (56881) Reviewed/Progressed HEP activities related to strengthening, flexibility, endurance, ROM of core, proximal hip and LE for functional self-care, mobility, lifting and ambulation   [] (24983) Reviewed/Progressed HEP activities related to improving balance, coordination, kinesthetic sense, posture, motor skill, proprioception of core, proximal hip and LE for self care, mobility, lifting, and ambulation      Manual Treatments:  PROM / STM / Oscillations-Mobs:  G-I, II, III, IV (PA's, Inf., Post.)  [] (22661) Provided manual therapy to mobilize proximal hip and LS spine soft tissue/joints for the purpose of modulating pain, promoting relaxation,  increasing ROM, reducing/eliminating soft tissue swelling/inflammation/restriction, improving soft tissue extensibility and allowing for proper ROM for normal function with self care, mobility, lifting and ambulation.      Modalities:   12/28: MH x 10 min to LS, L hip - pt in R SLying w pillow    Charges:  Timed Code Treatment Minutes: 42   Total Treatment Minutes: 52 [] EVAL - LOW (44510)   [] EVAL - MOD (80843)  [] EVAL - HIGH (13563)  [] RE-EVAL (59083)  [x] TE (79866) x 2     [] Ionto (47514)  [] NMR (49965) x      [] Vaso (88620)  [] Manual (02830) x      [] Ultrasound  [x] TA (03404) x 1     [] Mech Traction (43075)  [] Gait Training x     [] ES (un) (07986):   [] Aquatic therapy x   [] Other:   [] Group:     Goals:   Patient stated goal:  Decreased pain  [x]? Progressing: []? Met: []? Not Met: []? Adjusted     Therapist goals for Patient:   Short Term Goals: To be achieved in: 2 weeks  1. Independent in HEP and progression per patient tolerance, in order to prevent re-injury. [x]? Progressing: []? Met: []? Not Met: []? Adjusted  2. Patient will have a decrease in pain to facilitate improvement in movement, function, and ADLs as indicated by Functional Deficits. [x]? Progressing: []? Met: []? Not Met: []? Adjusted     Long Term Goals: To be achieved in: 6weeks  1. Disability index score of 25% or less for the JONO to assist with reaching prior level of function. [x]? Progressing: []? Met: []? Not Met: []? Adjusted  2. Patient will demonstrate increased AROM to WNL, good LS mobility, good hip ROM to allow for proper joint functioning as indicated by patients Functional Deficits. [x]? Progressing: []? Met: []? Not Met: []? Adjusted  3. Patient will demonstrate an increase in Strength to good proximal hip and core activation to allow for proper functional mobility as indicated by patients Functional Deficits. [x]? Progressing: []? Met: []? Not Met: []? Adjusted  4. Patient will return to functional activities including sleeping and prolonged positions without increased symptoms or restriction. [x]? Progressing: []? Met: []? Not Met: []? Adjusted  5. Patient will report 60% improvement in WB ADL tolerance and ease with turning in bed  [x]? Progressing: []? Met: []? Not Met: []?  Adjusted          Overall Progression Towards Functional goals/ Treatment Progress Update: [] Patient is progressing as expected towards functional goals listed. [] Progression is slowed due to complexities/Impairments listed. [] Progression has been slowed due to co-morbidities. [x] Plan just implemented, too soon to assess goals progression <30days   [] Goals require adjustment due to lack of progress  [] Patient is not progressing as expected and requires additional follow up with physician  [] Other    Persisting Functional Limitations/Impairments:  [x]Sitting []Standing   [x]Walking [x]Squatting/bending    [x]Stairs [x]ADL's    [x]Transfers []Reaching  [x]Housework []Job related tasks  []Driving []Sports/Recreation   [x]Sleeping []Other:    ASSESSMENT:  Pt able to tolerate standing and seated core/LE PREs without increase in pain. She had difficulty with L hip ER to cross ankle over knee. Needs further core stabs. Treatment/Activity Tolerance:  [] Patient able to complete tx  [] Patient limited by fatique  [x] Patient limited by pain  [] Patient limited by other medical complications  [] Other:     Prognosis: [x] Good [] Fair  [] Poor    Patient Requires Follow-up: [x] Yes  [] No    Plan for next treatment session:    PLAN: See eval. PT 2x / week for 6 weeks. [x] Continue per plan of care [] Alter current plan (see comments)  [] Plan of care initiated [] Hold pending MD visit [] Discharge    Electronically signed by: Nikos Bray PT, MPT [id number]      Note: If patient does not return for scheduled/ recommended follow up visits, this note will serve as a discharge from care along with most recent update on progress.

## 2020-12-30 ENCOUNTER — OFFICE VISIT (OUTPATIENT)
Dept: ORTHOPEDIC SURGERY | Age: 65
End: 2020-12-30
Payer: MEDICAID

## 2020-12-30 VITALS — RESPIRATION RATE: 16 BRPM | HEIGHT: 58 IN | TEMPERATURE: 97.7 F | BODY MASS INDEX: 33.58 KG/M2 | WEIGHT: 160 LBS

## 2020-12-30 PROCEDURE — 99203 OFFICE O/P NEW LOW 30 MIN: CPT | Performed by: PHYSICIAN ASSISTANT

## 2020-12-30 PROCEDURE — 1036F TOBACCO NON-USER: CPT | Performed by: PHYSICIAN ASSISTANT

## 2020-12-30 PROCEDURE — G8482 FLU IMMUNIZE ORDER/ADMIN: HCPCS | Performed by: PHYSICIAN ASSISTANT

## 2020-12-30 PROCEDURE — 3017F COLORECTAL CA SCREEN DOC REV: CPT | Performed by: PHYSICIAN ASSISTANT

## 2020-12-30 PROCEDURE — 26432 REPAIR FINGER TENDON: CPT | Performed by: PHYSICIAN ASSISTANT

## 2020-12-30 PROCEDURE — G8427 DOCREV CUR MEDS BY ELIG CLIN: HCPCS | Performed by: PHYSICIAN ASSISTANT

## 2020-12-30 PROCEDURE — G8417 CALC BMI ABV UP PARAM F/U: HCPCS | Performed by: PHYSICIAN ASSISTANT

## 2020-12-30 NOTE — PROGRESS NOTES
Ms. Winnie Santos is a 63 y.o. right handed woman  who is seen today in Hand Surgical Consultation at the request of BHAVANA Renteria CNP. She is seen today regarding an injury occurring on November 26th, 2020. She reports injuring her left Ring Finger, having fell . [place] At the time of injury, there was malposition of the finger with a flexed DIP joint. She was seen for evaluation elsewhere, radiographs were obtained & she has been immobilized. She reports mild pain located in the distal aspect of the Ring Finger, no tenderness of the remaining hand, wrist, or elbow. She notes today, no neurologic symptoms in the Whole Hand. Symptoms show no change over time. I have today reviewed with Winnie Santos the clinically relevant, past medical history, medications, allergies,  family history, social history, and Review Of Systems & I have documented any details relevant to today's presenting complaints in my history above. Ms. Santy Goodson's self-reported past medical history, medications, allergies,  family history, social history, and Review Of Systems have been scanned into the chart under the \"Media\" tab. Physical Exam:  Ms. Santy Goodson's most recent vitals:  Vitals  Temp: 97.7 °F (36.5 °C)  Temp Source: Infrared  Resp: 16  Height: 4' 10\" (147.3 cm)  Weight: 160 lb (72.6 kg)    She is well nourished, oriented to person, place & time. She demonstrates appropriate mood and affect as well as normal gait and station.     Skin: Normal in appearance, Normal Color and Free of Lesions Bilaterally   Digital range of motion is normal bilaterally except in the Ring Finger where the DIP joint shows no active extension, passive motion is full with some discomfort,  Wrist range of motion is Full and equal bilaterally bilaterally  Sensation is subjectively normal in the Whole Hand, objectively present in the same distribution bilaterally Vascular examination reveals normal, good capillary refill and good color bilaterally  There is minimal acute ecchymosis at the site of injury, similar finding is not seen elsewhere bilaterally  Swelling is mild in the Ring Finger, centered about the Distal interphalangeal joint. No swelling of any other digit, bilaterally. There is no evidence of gross joint instability bilaterally. Muscular strength is clinically appropriate bilaterally. There is Mild pain elicited with palpation of the dorsum of the injured DIP Joint. The base of the hand & wrist are not tender to palpation. Radiographic Evaluation:  Radiographs are reviewed  today (2 views of the left Ring Finger). They demonstrate no evidence of acute fracture, subluxation or dislocation. 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 There is not evidence of other injury or bony fracture. Impression:  Ms. Ernestina Elizalde has sustained recent Mallet Finger injury without associated fracture and presents requesting further treatment.     Plan: I have discussed with Ms. Olena Camarena the various treatment options for treatment of left Ring Finger Terminal Tendon Rupture (Mallet Finger). We discussed the options of Conservative management of the terminal tendon injury (accepting its current position and the functional consequences thereof), continuous splinting of the distal interphalangeal joint in hyperextension (and the limitations which go along with finger immobilization), and Surgical Management in the form of Open repair of the terminal tendon & percutaneous pinning of the DIP joint. she has elected to proceed conservativly, voicing an understanding of the other options available to her. I have explained the complications, limitations, expectations, alternatives, & risks of her chosen treatment. We discussed the possibility of residual symptoms as may be related to closed treatment of her tendon rupture including the possiblities of: failure of tendon healing, persistant deformity, persistant pain, limitation of motion (persistant extensor lag), future arthritic symptoms & the possible need for further treatment. She understood our discussion and was comfortable with her decision; she was provided with appropriate expectations. She is today fitted with a carefully applied digital splint, maintaining the DIP joint in a position of Mild Hyperextendion. The splint was secured to the finger in a fashion to allow PIP joint motion, but to prevent displacement of the splint. Ms. Olena Camarena. Is instructed in the continuous wear of the splint 24 hours a day without its removal for a period of 8 weeks. She is instructed to contact the office if she is unable to keep the splint in place or if it becomes dislodged, malpositioned, or otherwise unserviceable. I have asked her to schedule a follow-up appointment for 8 weeks from now at which time we will evaluate her healing. She is specifically instructed to contact the office between now & her scheduled appointment if she has concerns related to the splint or the underlying soft tissues. She is welcome to call for an appointment sooner if she has any additional concerns or questions. I have also discussed with Ms. Emery Lugo  the other treatment options available to her  for this condition. We have today selected to proceed with conservative management. She and I have agreed that if our current course of conservative treatment does not prove to be effective over the short term future, that she will schedule a follow-up appointment to discuss and select an alternate course of therapy including possibly injection or surgical treatment. Ms. Emery Lugo has been given a full verbal list of instructions and precautions related to her present condition. I have asked her to followup with me in the office at the prescribed time. She is also specifically requested to call or return to the office sooner if her symptoms change or worsen prior to the next scheduled appointment.

## 2020-12-30 NOTE — PATIENT INSTRUCTIONS
Instructions for Splint Use  Mallet Finger      1. Wear finger splint at all times. 2. DO NOT REMOVE SPLINT FROM FINGER FOR ANY REASON OR AT ANY TIME. 3.  You may wash and get the finger, tape, and splint wet as you wish. 4.  Please do not remove any of the tape that was applied to the finger or splint in the office. You may add more tape to the finger & splint to keep it secure and in proper position. 5.  If the splint is not fitting properly or becomes displaced, unserviceable or if you think that the splint is not doing its intended job, please call the office at [phone] to schedule an appointment to have it checked.

## 2020-12-31 ENCOUNTER — HOSPITAL ENCOUNTER (OUTPATIENT)
Dept: PHYSICAL THERAPY | Age: 65
Setting detail: THERAPIES SERIES
Discharge: HOME | End: 2020-12-31
Payer: MEDICAID

## 2020-12-31 DIAGNOSIS — A60.00 GENITAL HERPES SIMPLEX, UNSPECIFIED SITE: ICD-10-CM

## 2020-12-31 DIAGNOSIS — M25.511 CHRONIC RIGHT SHOULDER PAIN: ICD-10-CM

## 2020-12-31 DIAGNOSIS — G89.29 CHRONIC RIGHT SHOULDER PAIN: ICD-10-CM

## 2020-12-31 PROCEDURE — 97110 THERAPEUTIC EXERCISES: CPT | Performed by: PHYSICAL THERAPIST

## 2020-12-31 PROCEDURE — 97140 MANUAL THERAPY 1/> REGIONS: CPT | Performed by: PHYSICAL THERAPIST

## 2020-12-31 NOTE — FLOWSHEET NOTE
[place] , [place]  Phone: [phone]  Fax: [phone]    Physical Therapy Treatment Note/ Progress Report:     Date:  2020    Patient Name:  Mitesh Ray    :  1955  MRN: [id number]  Restrictions/Precautions:    Medical/Treatment Diagnosis Information:  · Diagnosis: M54.5  M47.816 Lx pain and Spondylosis  Treatment Diagnosis: LBP, Lx strain after a fall  Insurance/Certification information:  PT Insurance Information: [facility]  Physician Information:  Referring Practitioner: BHAVANA Álvarez CNP  Plan of care signed (Y/N): []  Yes  [x]  No     Date of Patient follow up with Physician:      Progress Report: []  Yes  [x]  No     Date Range for reporting period:  Beginnin20  Ending:     Progress report due (10 Rx/or 30 days whichever is less):     Recertification due (POC duration/ or 90 days whichever is less):     Visit # Insurance Allowable Auth required? Date Range   3 30 []  Yes  []  No PCY     Latex Allergy:  [x]NO      []YES  Preferred Language for Healthcare:   [x]English       []other:    Functional Scale:      Date assessed:  Oswestry: raw score =19 ; dysfunction =42 %  20    Pain level: 6/10 LB     Hx:  Patient stated complaint: Patient reports a fall in early November down 7 steps while at [employer]. Then on  she fell at home b/t couch and coffee table resulting in LBP and a torn tendon in her L 4th finger. R sided LBP has persisted. Pt states that L radicular s/s to her posterior knee onset yesterday. Pt is a type I DM but states it is controlled. She lives with her daughter and her family. She was referred by her PCP.      SUBJECTIVE: Pt reports having flare up in her R shldr today, and had spasm in her R big toe (trigger toe) after last session. She has requested to avoid R SLying.     OBJECTIVE: See eval  ? Observation:   ? Test measurements:      RESTRICTIONS/PRECAUTIONS: Exercises/Interventions:     Therapeutic Exercise (71627) Resistance / level Sets / Seconds Reps Notes / Cues    Hold this date   IB  30\" 2    HSS  Seated 6\" step 30\" 2                  Seated: Add set  TB hip ER  LAQ  L hip ER AROM     Green  1#   5\"   20  10  15 L/R  10       L more difficult  Pt unable to perform supine hip ER to cross ankle over knee           Mat EX:  PPT  TA set  GS with add set  LTR  SKTC  Piriformis str  Clams              10\"  10\"   10  10  10  10  2  2 R  add             L manual stretch by PT          Therapeutic Activities (30515)       DC HR - seemed to flare up trigger toe   Standing at counter:  Hip abd, extension, squat, GS     10x ea    CC:  multif   add Hold this date d/t shdlr pain                 Neuromuscular Re-ed (08967)                                          Manual Intervention (40349)       Prone PA       STM  to LS No roller  8' Pt in L Slying   Manual L hip ditraction   10 L                                       Bold = new ex         Pt. Education:  -pt educated on diagnosis, prognosis and expectations for rehab  -all pt questions were answered    Home Exercise Program:  Access Code: [email or web address]   URL: [employer]/   Date: 12/28/2020   Prepared by: Madhav Beltran     Exercises   Standing Hamstring Stretch with Step - 2 reps - 20 hold - 1x daily - 7x weekly   Seated Hip Abduction with Resistance - 10 reps - 1x daily - 7x weekly   Seated Hip Flexion and External Rotation - 10 reps - 1x daily - 7x weekly     Access Code: [email or web address]   URL: [employer]/   Date: 12/22/2020   Prepared by: Madhav Beltran     Exercises   Supine Posterior Pelvic Tilt - 10 reps - 3 hold - 1-2x daily - 7x weekly   Supine Lower Trunk Rotation - 10 reps - 1x daily - 7x weekly   Hooklying Gluteal Sets - 10 reps - 5 hold - 1x daily - 7x weekly   Supine Transversus Abdominis Bracing - Hands on Stomach - 10 reps - 5 hold - 1x daily - 7x weekly Seated Hamstring Stretch - 2 reps - 20 hold - 1x daily - 7x weekly     Therapeutic Exercise and NMR EXR  [x] (53397) Provided verbal/tactile cueing for activities related to strengthening, flexibility, endurance, ROM  for improvements in proximal hip and core control with self care, mobility, lifting and ambulation.  [] (13674) Provided verbal/tactile cueing for activities related to improving balance, coordination, kinesthetic sense, posture, motor skill, proprioception  to assist with core control in self care, mobility, lifting, and ambulation. Therapeutic Activities:    [] (43753 or 90469) Provided verbal/tactile cueing for activities related to improving balance, coordination, kinesthetic sense, posture, motor skill, proprioception and motor activation to allow for proper function  with self care and ADLs  [] (74326) Provided training and instruction to the patient for proper core and proximal hip recruitment and positioning with ambulation re-education     Home Exercise Program:    [x] (16147) Reviewed/Progressed HEP activities related to strengthening, flexibility, endurance, ROM of core, proximal hip and LE for functional self-care, mobility, lifting and ambulation   [] (52785) Reviewed/Progressed HEP activities related to improving balance, coordination, kinesthetic sense, posture, motor skill, proprioception of core, proximal hip and LE for self care, mobility, lifting, and ambulation      Manual Treatments:  PROM / STM / Oscillations-Mobs:  G-I, II, III, IV (PA's, Inf., Post.)  [x] (18198) Provided manual therapy to mobilize proximal hip and LS spine soft tissue/joints for the purpose of modulating pain, promoting relaxation,  increasing ROM, reducing/eliminating soft tissue swelling/inflammation/restriction, improving soft tissue extensibility and allowing for proper ROM for normal function with self care, mobility, lifting and ambulation. Modalities:   12/31: MH x 10 min to LB and R shldr - pt seated    Charges:  Timed Code Treatment Minutes: 45   Total Treatment Minutes: 55       [] EVAL - LOW (85625)   [] EVAL - MOD (99450)  [] EVAL - HIGH (73754)  [] RE-EVAL (78740)  [x] TE (02774) x 2     [] Ionto (10450)  [] NMR (11690) x      [] Vaso (63754)  [x] Manual (88769) x1      [] Ultrasound  [] TA (61748) x 1     [] Mech Traction (05668)  [] Gait Training x     [] ES (un) (30174):   [] Aquatic therapy x   [] Other:   [] Group:     Goals:   Patient stated goal:  Decreased pain  [x]? Progressing: []? Met: []? Not Met: []? Adjusted     Therapist goals for Patient:   Short Term Goals: To be achieved in: 2 weeks  1. Independent in HEP and progression per patient tolerance, in order to prevent re-injury. [x]? Progressing: []? Met: []? Not Met: []? Adjusted  2. Patient will have a decrease in pain to facilitate improvement in movement, function, and ADLs as indicated by Functional Deficits. [x]? Progressing: []? Met: []? Not Met: []? Adjusted     Long Term Goals: To be achieved in: 6weeks  1. Disability index score of 25% or less for the JONO to assist with reaching prior level of function. [x]? Progressing: []? Met: []? Not Met: []? Adjusted  2. Patient will demonstrate increased AROM to WNL, good LS mobility, good hip ROM to allow for proper joint functioning as indicated by patients Functional Deficits. [x]? Progressing: []? Met: []? Not Met: []? Adjusted  3. Patient will demonstrate an increase in Strength to good proximal hip and core activation to allow for proper functional mobility as indicated by patients Functional Deficits. [x]? Progressing: []? Met: []? Not Met: []? Adjusted  4. Patient will return to functional activities including sleeping and prolonged positions without increased symptoms or restriction. [x]? Progressing: []? Met: []? Not Met: []?  Adjusted 5. Patient will report 60% improvement in WB ADL tolerance and ease with turning in bed  [x]? Progressing: []? Met: []? Not Met: []? Adjusted          Overall Progression Towards Functional goals/ Treatment Progress Update:  [] Patient is progressing as expected towards functional goals listed. [] Progression is slowed due to complexities/Impairments listed. [] Progression has been slowed due to co-morbidities. [x] Plan just implemented, too soon to assess goals progression <30days   [] Goals require adjustment due to lack of progress  [] Patient is not progressing as expected and requires additional follow up with physician  [] Other    Persisting Functional Limitations/Impairments:  [x]Sitting []Standing   [x]Walking [x]Squatting/bending    [x]Stairs [x]ADL's    [x]Transfers []Reaching  [x]Housework []Job related tasks  []Driving []Sports/Recreation   [x]Sleeping []Other:    ASSESSMENT:  Pt able to tolerate standing and seated core/LE PREs . Held foam d/t flare up of R grt toe. She cont to have difficulty with L hip ER to cross ankle over knee - PT performed manual hip distraction. Added STM to LS with hands only this date. Needs further core stabs. Treatment/Activity Tolerance:  [] Patient able to complete tx  [] Patient limited by fatique  [x] Patient limited by pain  [] Patient limited by other medical complications  [] Other:     Prognosis: [x] Good [] Fair  [] Poor    Patient Requires Follow-up: [x] Yes  [] No    Plan for next treatment session: Patient will check with her insurance before she schedules further sessions being a new year. progress strengthening  And core stabs    PLAN: See eval. PT 2x / week for 6 weeks.    [x] Continue per plan of care [] Alter current plan (see comments)  [] Plan of care initiated [] Hold pending MD visit [] Discharge    Electronically signed by: Constantine Bumpers PT, MPT [id number] Note: If patient does not return for scheduled/ recommended follow up visits, this note will serve as a discharge from care along with most recent update on progress.

## 2021-01-04 RX ORDER — CYCLOBENZAPRINE HCL 5 MG
TABLET ORAL
Qty: 30 TABLET | Refills: 0 | Status: SHIPPED | OUTPATIENT
Start: 2021-01-04 | End: 2021-02-15

## 2021-01-04 RX ORDER — ACYCLOVIR 200 MG/1
CAPSULE ORAL
Qty: 120 CAPSULE | Refills: 0 | Status: SHIPPED | OUTPATIENT
Start: 2021-01-04 | End: 2021-02-05 | Stop reason: SDUPTHER

## 2021-01-11 ENCOUNTER — TELEPHONE (OUTPATIENT)
Dept: INTERNAL MEDICINE CLINIC | Age: 66
End: 2021-01-11

## 2021-01-11 NOTE — TELEPHONE ENCOUNTER
Patient is requesting a refill of cyclobenzaprine (FLEXERIL) 5 MG tablet sent to [facility] on [address] in [place].

## 2021-01-13 ENCOUNTER — VIRTUAL VISIT (OUTPATIENT)
Dept: PSYCHOLOGY | Age: 66
End: 2021-01-13
Payer: MEDICAID

## 2021-01-13 DIAGNOSIS — F43.10 PTSD (POST-TRAUMATIC STRESS DISORDER): Primary | ICD-10-CM

## 2021-01-13 PROCEDURE — 90832 PSYTX W PT 30 MINUTES: CPT | Performed by: PSYCHOLOGIST
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
## 2021-01-15 ENCOUNTER — TELEPHONE (OUTPATIENT)
Dept: INTERNAL MEDICINE CLINIC | Age: 66
End: 2021-01-15

## 2021-01-15 NOTE — TELEPHONE ENCOUNTER
----- Message from Mei Krishnamurthy sent at 1/15/2021  2:33 PM EST -----  Subject: Message to Provider    QUESTIONS  Information for Provider? PT wants PCP to call back to confirm 1/19/2021   appointment to discuss her finger and back. PT is unsure which information   is correct      ---------------------------------------------------------------------------  --------------  CALL BACK INFO  What is the best way for the office to contact you? OK to leave message on   voicemail  Preferred Call Back Phone Number? [phone]  ---------------------------------------------------------------------------  --------------  SCRIPT ANSWERS  Relationship to Patient?  Self

## 2021-01-15 NOTE — TELEPHONE ENCOUNTER
Called pt back and advised her that she has an appt scheduled for 02/05. She has finger issue since she had a fall and was seen by [address]. which she wants to go over with [place] during the visit.

## 2021-02-04 ENCOUNTER — HOSPITAL ENCOUNTER (OUTPATIENT)
Dept: WOMENS IMAGING | Age: 66
Discharge: HOME | End: 2021-02-04
Payer: MEDICAID

## 2021-02-04 DIAGNOSIS — Z12.31 ENCOUNTER FOR SCREENING MAMMOGRAM FOR MALIGNANT NEOPLASM OF BREAST: ICD-10-CM

## 2021-02-04 PROCEDURE — 77066 DX MAMMO INCL CAD BI: CPT

## 2021-02-04 NOTE — PROGRESS NOTES
Office Visit   2/5/2021    Subjective:  Chief Complaint   Patient presents with    Hypertension     L ring finger      HPI:   Christy Huerta is a 61 y.o. female who presents to the clinic today for follow up. T1DM- She follows with endocrine - Dr. Charlotte Oneill.     HTN- using lifestyle modification. Pt reports that she was taking losartan for kidneys. Stopped d/t cough- approved by endocrinology.   Not taking BP at home. Not exercising. Asymptomatic. No chest pain, palpitations, shortness of breath, trouble breathing, lightheadedness, dizziness or blurred vision.     Depression/PTSD/Anxiety-Life stressors are multiple per pt. Pt is taking Zoloft 100 mg daily and amitriptyline 100 mg nightly. States her mood is doing well. Denies SI/HI. Not interested in changing medication regimen.     HLD- Allergy to atorvastatin. Took pravastatin without concerns (and without side effects).     Genital herpes- Pt reports that she takes acyclovir 400 mg once nightly- prescribed for BID. She states she increases the dose with a flare- no recent flare.     Vit D deficiency- taking supplements daily. Asymptomatic.      Allergic rhinitis- well controlled with flonase.      States she had an injury to her left 4th finger. She had Xrays and used a brace with lifestyle modifications. Referred to Dr. Odette Aase for pt's finger- has f/u next month. Low back pains- Saw another provider and Xrays completed. Performing PT- states she has stopped this for the last 3 weeks d/t insurance, but states she is doing exercises at home. She thinks her back is improving. Review of Systems   Constitutional: Negative for chills, fatigue, fever and unexpected weight change. Eyes: Negative for visual disturbance. Respiratory: Negative for cough, shortness of breath and wheezing. Cardiovascular: Negative for chest pain, palpitations and leg swelling. 221-250 = 1 unit  251-300 = 2 units   301 - 350 = 3 units   351-400= 4 units   401-430 = 5 units      prednisoLONE acetate (PRED FORTE) 1 % ophthalmic suspension Place 4 drops into both eyes 4 times daily         Patient Active Problem List   Diagnosis    Anxiety and depression    PTSD (post-traumatic stress disorder)    Type 2 diabetes mellitus without complication, with long-term current use of insulin (HCC)    Essential hypertension    Genital herpes simplex    Hyperlipidemia        Wt Readings from Last 3 Encounters:   02/05/21 159 lb (72.1 kg)   12/30/20 160 lb (72.6 kg)   12/21/20 160 lb (72.6 kg)     BP Readings from Last 3 Encounters:   02/05/21 (!) 142/70   12/21/20 132/60   12/08/20 138/78     The 10-year ASCVD risk score (Sav Riddle, et al., 2013) is: 11.6%    Values used to calculate the score:      Age: 61 years      Sex: Female      Is Non- : No      Diabetic: Yes      Tobacco smoker: No      Systolic Blood Pressure: 751 mmHg      Is BP treated: No      HDL Cholesterol: 47 mg/dL      Total Cholesterol: 179 mg/dL    PHQ-9 Total Score: 4 (2/5/2021  3:09 PM)  Thoughts that you would be better off dead, or of hurting yourself in some way: 0 (2/5/2021  3:09 PM)    Vitals 2/5/2021 8/1/7984   SYSTOLIC 250 813   DIASTOLIC 70 70     Objective/Physical Exam:  BP (!) 142/70   Pulse 95   Temp 97.2 °F (36.2 °C) (Temporal)   Wt 159 lb (72.1 kg)   SpO2 97%   BMI 33.23 kg/m²   Body mass index is 33.23 kg/m². Physical Exam  Vitals signs reviewed. Constitutional:       General: She is not in acute distress. Appearance: She is well-developed. She is not diaphoretic. HENT:      Head: Normocephalic and atraumatic. Eyes:      Pupils: Pupils are equal, round, and reactive to light. Cardiovascular:      Rate and Rhythm: Normal rate and regular rhythm. Pulmonary:      Effort: Pulmonary effort is normal. No respiratory distress. Breath sounds: Normal breath sounds. No wheezing or rales. Chest:      Chest wall: No tenderness. Abdominal:      General: Bowel sounds are normal.      Palpations: Abdomen is soft. Musculoskeletal:      Comments: Left finger brace present   Skin:     General: Skin is warm and dry. Coloration: Skin is not pale. Findings: No erythema or rash. Neurological:      Mental Status: She is alert and oriented to person, place, and time. Coordination: Coordination normal.   Psychiatric:         Mood and Affect: Mood normal.       Assessment and Plan:  Kacie Teran was seen today for hypertension. Diagnoses and all orders for this visit:    Type 1 diabetes mellitus without complication ([facility] [address]   - Continue with endocrinology    Essential hypertension   - Blood pressure is elevated today. Reviewed restarting blood pressure medication. Patient reports she thinks her diet has worsened. - Patient reports that she would like to trial lifestyle modifications and monitor blood pressure at home and call with elevated readings. - Lifestyle modifications such as exercise, weight loss and healthy diet encouraged and reviewed with the pt.   - Goal blood pressure reviewed with the patient and she will call with elevated readings. - Red flag warning signs reviewed with the patient she will go to the ER if these occur    PTSD (post-traumatic stress disorder)/Anxiety and depression  -    States her mood is well controlled this regimen. She states she is not interested in changing doses or medications at this time. Denies suicidal/homicidal ideation. - sertraline (ZOLOFT) 100 MG tablet; TAKE 1 TABLET BY MOUTH ONCE DAILY  -     amitriptyline (ELAVIL) 100 MG tablet; Take 1 tablet by mouth nightly    Mixed hyperlipidemia  -    Asymptomatic. denies side effects  - Continue current regimen  - pravastatin (PRAVACHOL) 40 MG tablet;  Take 1 tablet by mouth once daily    Genital herpes simplex, unspecified site

## 2021-02-05 ENCOUNTER — OFFICE VISIT (OUTPATIENT)
Dept: INTERNAL MEDICINE CLINIC | Age: 66
End: 2021-02-05
Payer: MEDICAID

## 2021-02-05 VITALS
OXYGEN SATURATION: 97 % | HEART RATE: 95 BPM | SYSTOLIC BLOOD PRESSURE: 142 MMHG | WEIGHT: 159 LBS | DIASTOLIC BLOOD PRESSURE: 70 MMHG | TEMPERATURE: 97.2 F | BODY MASS INDEX: 33.23 KG/M2

## 2021-02-05 DIAGNOSIS — E55.9 VITAMIN D DEFICIENCY: ICD-10-CM

## 2021-02-05 DIAGNOSIS — F41.9 ANXIETY AND DEPRESSION: ICD-10-CM

## 2021-02-05 DIAGNOSIS — Z12.11 SCREEN FOR COLON CANCER: ICD-10-CM

## 2021-02-05 DIAGNOSIS — F43.10 PTSD (POST-TRAUMATIC STRESS DISORDER): ICD-10-CM

## 2021-02-05 DIAGNOSIS — E78.2 MIXED HYPERLIPIDEMIA: ICD-10-CM

## 2021-02-05 DIAGNOSIS — A60.00 GENITAL HERPES SIMPLEX, UNSPECIFIED SITE: ICD-10-CM

## 2021-02-05 DIAGNOSIS — J30.89 SEASONAL ALLERGIC RHINITIS DUE TO OTHER ALLERGIC TRIGGER: ICD-10-CM

## 2021-02-05 DIAGNOSIS — F32.A ANXIETY AND DEPRESSION: ICD-10-CM

## 2021-02-05 DIAGNOSIS — M54.50 LUMBAR PAIN: ICD-10-CM

## 2021-02-05 DIAGNOSIS — M20.012 MALLET FINGER OF LEFT HAND: ICD-10-CM

## 2021-02-05 DIAGNOSIS — E10.9 TYPE 1 DIABETES MELLITUS WITHOUT COMPLICATION (HCC): Primary | ICD-10-CM

## 2021-02-05 DIAGNOSIS — I10 ESSENTIAL HYPERTENSION: ICD-10-CM

## 2021-02-05 PROCEDURE — 99214 OFFICE O/P EST MOD 30 MIN: CPT | Performed by: NURSE PRACTITIONER

## 2021-02-05 PROCEDURE — G8482 FLU IMMUNIZE ORDER/ADMIN: HCPCS | Performed by: NURSE PRACTITIONER

## 2021-02-05 PROCEDURE — 2022F DILAT RTA XM EVC RTNOPTHY: CPT | Performed by: NURSE PRACTITIONER

## 2021-02-05 PROCEDURE — 3017F COLORECTAL CA SCREEN DOC REV: CPT | Performed by: NURSE PRACTITIONER

## 2021-02-05 PROCEDURE — G8427 DOCREV CUR MEDS BY ELIG CLIN: HCPCS | Performed by: NURSE PRACTITIONER

## 2021-02-05 PROCEDURE — G8417 CALC BMI ABV UP PARAM F/U: HCPCS | Performed by: NURSE PRACTITIONER

## 2021-02-05 PROCEDURE — 3046F HEMOGLOBIN A1C LEVEL >9.0%: CPT | Performed by: NURSE PRACTITIONER

## 2021-02-05 PROCEDURE — 1036F TOBACCO NON-USER: CPT | Performed by: NURSE PRACTITIONER

## 2021-02-05 RX ORDER — FLUTICASONE PROPIONATE 50 MCG
1 SPRAY, SUSPENSION (ML) NASAL DAILY
Qty: 1 BOTTLE | Refills: 2 | Status: SHIPPED | OUTPATIENT
Start: 2021-02-05 | End: 2021-05-07 | Stop reason: SDUPTHER

## 2021-02-05 RX ORDER — ACYCLOVIR 200 MG/1
CAPSULE ORAL
Qty: 120 CAPSULE | Refills: 1 | Status: SHIPPED | OUTPATIENT
Start: 2021-02-05 | End: 2021-05-07 | Stop reason: SDUPTHER

## 2021-02-05 RX ORDER — SERTRALINE HYDROCHLORIDE 100 MG/1
TABLET, FILM COATED ORAL
Qty: 90 TABLET | Refills: 1 | Status: SHIPPED | OUTPATIENT
Start: 2021-02-05 | End: 2021-05-07 | Stop reason: SDUPTHER

## 2021-02-05 RX ORDER — PRAVASTATIN SODIUM 40 MG
TABLET ORAL
Qty: 90 TABLET | Refills: 1 | Status: SHIPPED | OUTPATIENT
Start: 2021-02-05 | End: 2021-05-07 | Stop reason: SDUPTHER

## 2021-02-05 RX ORDER — AMITRIPTYLINE HYDROCHLORIDE 100 MG/1
100 TABLET, FILM COATED ORAL NIGHTLY
Qty: 90 TABLET | Refills: 1 | Status: SHIPPED | OUTPATIENT
Start: 2021-02-05 | End: 2021-05-07 | Stop reason: SDUPTHER

## 2021-02-05 ASSESSMENT — ENCOUNTER SYMPTOMS
COUGH: 0
ABDOMINAL PAIN: 0
WHEEZING: 0
BACK PAIN: 1
CONSTIPATION: 0
DIARRHEA: 0
VOMITING: 0
NAUSEA: 0
SHORTNESS OF BREATH: 0

## 2021-02-05 ASSESSMENT — PATIENT HEALTH QUESTIONNAIRE - PHQ9
3. TROUBLE FALLING OR STAYING ASLEEP: 0
5. POOR APPETITE OR OVEREATING: 0
1. LITTLE INTEREST OR PLEASURE IN DOING THINGS: 0
SUM OF ALL RESPONSES TO PHQ QUESTIONS 1-9: 4
7. TROUBLE CONCENTRATING ON THINGS, SUCH AS READING THE NEWSPAPER OR WATCHING TELEVISION: 1
9. THOUGHTS THAT YOU WOULD BE BETTER OFF DEAD, OR OF HURTING YOURSELF: 0
SUM OF ALL RESPONSES TO PHQ9 QUESTIONS 1 & 2: 1
6. FEELING BAD ABOUT YOURSELF - OR THAT YOU ARE A FAILURE OR HAVE LET YOURSELF OR YOUR FAMILY DOWN: 0

## 2021-02-05 NOTE — PATIENT INSTRUCTIONS
Take a daily BP log and Call if BP >135/85    Bring stool sample to office    Patient Education   DASH Diet: Care Instructions  Your Care Instructions     The DASH diet is an eating plan that can help lower your blood pressure. DASH stands for Dietary Approaches to Stop Hypertension. Hypertension is high blood pressure. The DASH diet focuses on eating foods that are high in calcium, potassium, and magnesium. These nutrients can lower blood pressure. The foods that are highest in these nutrients are fruits, vegetables, low-fat dairy products, nuts, seeds, and legumes. But taking calcium, potassium, and magnesium supplements instead of eating foods that are high in those nutrients does not have the same effect. The DASH diet also includes whole grains, fish, and poultry. The DASH diet is one of several lifestyle changes your doctor may recommend to lower your high blood pressure. Your doctor may also want you to decrease the amount of sodium in your diet. Lowering sodium while following the DASH diet can lower blood pressure even further than just the DASH diet alone. Follow-up care is a key part of your treatment and safety. Be sure to make and go to all appointments, and call your doctor if you are having problems. It's also a good idea to know your test results and keep a list of the medicines you take. How can you care for yourself at home? Following the DASH diet  · Eat 4 to 5 servings of fruit each day. A serving is 1 medium-sized piece of fruit, ½ cup chopped or canned fruit, 1/4 cup dried fruit, or 4 ounces (½ cup) of fruit juice. Choose fruit more often than fruit juice. · Eat 4 to 5 servings of vegetables each day. A serving is 1 cup of lettuce or raw leafy vegetables, ½ cup of chopped or cooked vegetables, or 4 ounces (½ cup) of vegetable juice. Choose vegetables more often than vegetable juice. · Get 2 to 3 servings of low-fat and fat-free dairy each day. A serving is 8 ounces of milk, 1 cup of yogurt, or 1 ½ ounces of cheese. · Eat 6 to 8 servings of grains each day. A serving is 1 slice of bread, 1 ounce of dry cereal, or ½ cup of cooked rice, pasta, or cooked cereal. Try to choose whole-grain products as much as possible. · Limit lean meat, poultry, and fish to 2 servings each day. A serving is 3 ounces, about the size of a deck of cards. · Eat 4 to 5 servings of nuts, seeds, and legumes (cooked dried beans, lentils, and split peas) each week. A serving is 1/3 cup of nuts, 2 tablespoons of seeds, or ½ cup of cooked beans or peas. · Limit fats and oils to 2 to 3 servings each day. A serving is 1 teaspoon of vegetable oil or 2 tablespoons of salad dressing. · Limit sweets and added sugars to 5 servings or less a week. A serving is 1 tablespoon jelly or jam, ½ cup sorbet, or 1 cup of lemonade. · Eat less than 2,300 milligrams (mg) of sodium a day. If you limit your sodium to 1,500 mg a day, you can lower your blood pressure even more. Tips for success  · Start small. Do not try to make dramatic changes to your diet all at once. You might feel that you are missing out on your favorite foods and then be more likely to not follow the plan. Make small changes, and stick with them. Once those changes become habit, add a few more changes. · Try some of the following:  ? Make it a goal to eat a fruit or vegetable at every meal and at snacks. This will make it easy to get the recommended amount of fruits and vegetables each day. ? Try yogurt topped with fruit and nuts for a snack or healthy dessert. ? Add lettuce, tomato, cucumber, and onion to sandwiches. ? Combine a ready-made pizza crust with low-fat mozzarella cheese and lots of vegetable toppings. Try using tomatoes, squash, spinach, broccoli, carrots, cauliflower, and onions. ? Have a variety of cut-up vegetables with a low-fat dip as an appetizer instead of chips and dip. ? Sprinkle sunflower seeds or chopped almonds over salads. Or try adding chopped walnuts or almonds to cooked vegetables. ? Try some vegetarian meals using beans and peas. Add garbanzo or kidney beans to salads. Make burritos and tacos with mashed mcclure beans or black beans. Where can you learn more? Go to https://[employer]. org and sign in to your [employer]One account. Enter V474 in the [employer] box to learn more about \"DASH Diet: Care Instructions. \"     If you do not have an account, please click on the \"Sign Up Now\" link. Current as of: December 16, 2019               Content Version: 12.6  © 8365-8174 [employer]. Care instructions adapted under license by [facility] [place] ([facility]). If you have questions about a medical condition or this instruction, always ask your healthcare professional. [address] any warranty or liability for your use of this information.

## 2021-02-11 ENCOUNTER — VIRTUAL VISIT (OUTPATIENT)
Dept: PSYCHOLOGY | Age: 66
End: 2021-02-11
Payer: MEDICAID

## 2021-02-11 DIAGNOSIS — F43.10 PTSD (POST-TRAUMATIC STRESS DISORDER): Primary | ICD-10-CM

## 2021-02-11 PROCEDURE — 90832 PSYTX W PT 30 MINUTES: CPT | Performed by: PSYCHOLOGIST
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
## 2021-02-13 DIAGNOSIS — M25.511 CHRONIC RIGHT SHOULDER PAIN: ICD-10-CM

## 2021-02-13 DIAGNOSIS — G89.29 CHRONIC RIGHT SHOULDER PAIN: ICD-10-CM

## 2021-02-15 RX ORDER — CYCLOBENZAPRINE HCL 5 MG
TABLET ORAL
Qty: 30 TABLET | Refills: 0 | Status: SHIPPED | OUTPATIENT
Start: 2021-02-15 | End: 2021-03-26

## 2021-02-24 ENCOUNTER — OFFICE VISIT (OUTPATIENT)
Dept: ORTHOPEDIC SURGERY | Age: 66
End: 2021-02-24

## 2021-02-24 VITALS — RESPIRATION RATE: 16 BRPM | HEIGHT: 58 IN | WEIGHT: 159 LBS | TEMPERATURE: 98.4 F | BODY MASS INDEX: 33.37 KG/M2

## 2021-02-24 DIAGNOSIS — M20.012 MALLET FINGER OF LEFT FINGER(S): Primary | ICD-10-CM

## 2021-02-24 PROCEDURE — 99024 POSTOP FOLLOW-UP VISIT: CPT | Performed by: ORTHOPAEDIC SURGERY

## 2021-02-24 NOTE — PROGRESS NOTES
Ms. Jennifer Corrales returns today in follow-up of her recent left Ring Finger mallet injury injury which occurred approximately 3 months ago. She has been treated with DIP Joint Extension Splinting (Stack Splint)  She reports that her splint wear was intermittent since it was applied at the last office visit. She has noted decreased discomfort and decreased swelling. She notes no symptoms of numbness, tingling, no symptoms related to perfusion. I have today reviewed with Jennifer Corrales the clinically relevant, past medical history, medications, allergies,  family history, social history, and Review Of Systems & I have documented any details relevant to today's presenting complaints in my history above. Ms. Halley Goodson's self-reported past medical history, medications, allergies,  family history, social history, and Review Of Systems have been scanned into the chart under the \"Media\" tab. Physical Exam:  Vitals  Temp: 98.4 °F (36.9 °C)  Temp Source: Infrared  Resp: 16  Height: 4' 10\" (147.3 cm)  Weight: 159 lb (72.1 kg)  Ms. Jennifer Corrales appears well, she is in no apparent distress, she demonstrates appropriate mood & affect. Skin: (after immobilization is removed) Skin: Normal Color and Free of Lesions Bilaterally    Digits: left Ring Finger DIP joint shows full passive extension, 10 degrees active extensor lag, flexion is stiff from immobilization. Other Fingers & Thumb show Full bilaterally. There is not residual tenderness at the injury site  Wrist range of motion is full bilaterally  There is no evidence of gross joint instability bilaterally. Sensation is normal on the Left, normal on the Right  Vascular examination reveals normal and good capillary refill bilaterally  Swelling is mild in the injured  Ring Finger on the Left, normal on the Right  Muscular strength is clinically appropriate bilaterally.         Impression: Ms. Tori Thrasher is doing well after recent left Ring Finger Mallet injury. It would appear that she has healed her injury. Plan:  Ms. Tori Thrasher is instructed in weaning from her splint and continued Night Splinting for the next one month. She is also instructed in work on Active & Passive range of motion of the digits, wrist, & elbow. These modalities were demonstrated to her today. We discussed the continued restrictions on the use of the injured hand and the limitations on resumption of activities until full range of motion and comfort have been regained. I have explained the time course and likely expectations for maximal recovery of motion and function. We discussed the option of pursuing formalized hand therapy and a prescription  was offered and declined by the patient. I have asked Ms. Tori Thrasher to follow-up with me by either scheduling an appointment for 4 weeks from now or by calling me at that time if she has not been able to regain full painless range of motion and functional use of the injured extremity. She is also specifically instructed to return to the office or call for an appointment sooner if her symptoms are changing or worsening prior to that time.

## 2021-02-24 NOTE — PATIENT INSTRUCTIONS
Hand Range of Motion Instructions      Dr. Denise Todd    1. Be cautions in resuming fulll activities and use of the hand for next 2 - 4 weeks. 2. Perform the following exercises VIGOROUSLY at least four times a day. Exercises should be performed in the seated position with elbow on tabletop or other firm surface. If you cannot make these motions on your own, you may use other hand to assist in making these motions. A. Fully straighten fingers until hand is flat. Fully bend fingers until hand is in a full fist.   B. Bend wrist forward and backward (grasp hand around knuckles with other hand to do so). C. Rotate forearm so that your palm faces towards your face. Rotate forearm so that your palm faces away from your face (grasp hand around wrist with other hand to do so). D. Fully straighten elbow. Fully bend elbow. 3. Continue light use of the hand progressing to more normal us as it feels comfortable to do so. 4. In 2 - 4 weeks you may discontinue using the brace (if you were using one) and resume normal use of the hand and wrist if you have regained full and painless motion and function. 5. If you are unable to achieve  full and painless motion and function over 4 weeks, please call the office at [phone] to schedule a follow-up appointment with Dr. Christopher Colvin. Thank you for choosing [facility] for your Hand and Upper Extremity needs. If we can be of any further assistance to you, please do not hesitate to contact us.     Office Phone Number:  [phone]  or  [phone]

## 2021-02-24 NOTE — Clinical Note
Dear  Tran Foster, APRN - CNP,    Thank you very much for your referral or Ms. Francis Caro to me for evaluation and treatment of her Hand & Wrist condition. I appreciate your confidence in me and thank you for allowing me the opportunity to care for your patients. If I can be of any further assistance to you on this or any other patient, please do not hesitate to contact me. Sincerely,    Romaine Garzon.  Leah Gilmore MD

## 2021-03-05 ENCOUNTER — VIRTUAL VISIT (OUTPATIENT)
Dept: PSYCHOLOGY | Age: 66
End: 2021-03-05
Payer: MEDICAID

## 2021-03-05 DIAGNOSIS — F43.10 PTSD (POST-TRAUMATIC STRESS DISORDER): Primary | ICD-10-CM

## 2021-03-05 PROCEDURE — 90832 PSYTX W PT 30 MINUTES: CPT | Performed by: PSYCHOLOGIST

## 2021-03-05 NOTE — PROGRESS NOTES
TELEHEALTH VISIT -- Audio/Visual (During KWMIR-10 public health emergency)  }  Pursuant to the emergency declaration under the [address] [place] [address], [facility] waiver authority and the [employer] Act, this Virtual Visit was conducted, with patient's consent, to reduce the patient's risk of exposure to COVID-19 and provide continuity of care for an established patient. Services were provided through a video synchronous discussion virtually to substitute for in-person clinic visit. Pt gave verbal informed consent to participate in telehealth services. Conducted a risk-benefit analysis and determined that the patient's presenting problems are consistent with the use of telepsychology. Determined that the patient has sufficient knowledge and skills in the use of technology enabling them to adequately benefit from telepsychology. It was determined that this patient was able to be properly treated without an in-person session. Patient verified that they were currently located at the [facility] address that was provided during registration or another [facility] address, if noted below. Verified the following information:  Patient's identification: Yes  Patient location: [address] [place] [address]   Patient's call back number: [phone]   Patient's emergency contact's name and number, as well as permission to contact them if needed: Extended Emergency Contact Information  Primary Emergency Contact: Riley Duverney [address] Phone: [phone]  Relation: Child     Provider location: [facility]  Maycol Loyd, Ph.D.  Psychologist  3/5/2021  11:46 AM      Time spent with Patient: 30 minutes  This is patient's seventh  [facility] appointment. Reason for Consult:    Chief Complaint   Patient presents with    Anxiety       Feedback given to PCP. S:  Pt seen for f/u of PTSD.  Pt reported generally okay mood and sxs, but today is their anniversary and stated she didn't sleep well bc of that. Discussed her hx w , including early marriage and when she started to notice  was not in a healthy mental state. Discussed permission to grieve.  Looking forward to getting outside, getting vaccine, etc.     O:  MSE:    Appearance    alert, cooperative  Appetite normal  Sleep disturbance No  Fatigue No  Loss of pleasure No  Impulsive behavior No  Speech    spontaneous, normal rate, normal volume and well articulated  Mood    Anxious  Affect    anxiety  Thought Content    intact and cognitive distortions  Thought Process    linear, goal directed and coherent  Associations    logical connections  Insight    Fair  Judgment    Intact  Orientation    oriented to person, place, time, and general circumstances  Memory    recent and remote memory intact  Attention/Concentration    intact  Morbid ideation No  Suicide Assessment    no suicidal ideation    History:  Social History:   Social History     Socioeconomic History    Marital status: Legally      Spouse name: Not on file    Number of children: Not on file    Years of education: Not on file    Highest education level: Not on file   Occupational History    Not on file   Social Needs    Financial resource strain: Not hard at all   [employer] insecurity     Worry: Never true     Inability: Never true   [employer] needs     Medical: No     Non-medical: No   Tobacco Use    Smoking status: Never Smoker    Smokeless tobacco: Never Used   Substance and Sexual Activity    Alcohol use: Not Currently     Frequency: Never    Drug use: Never    Sexual activity: Not Currently   Lifestyle    Physical activity     Days per week: Not on file     Minutes per session: Not on file    Stress: Not on file   Relationships    Social connections     Talks on phone: Not on file     Gets together: Not on file     Attends Jehovah's witness service: Not

## 2021-03-26 DIAGNOSIS — M25.511 CHRONIC RIGHT SHOULDER PAIN: ICD-10-CM

## 2021-03-26 DIAGNOSIS — G89.29 CHRONIC RIGHT SHOULDER PAIN: ICD-10-CM

## 2021-03-26 RX ORDER — CYCLOBENZAPRINE HCL 5 MG
TABLET ORAL
Qty: 30 TABLET | Refills: 0 | Status: SHIPPED | OUTPATIENT
Start: 2021-03-26 | End: 2021-04-27

## 2021-04-21 ENCOUNTER — VIRTUAL VISIT (OUTPATIENT)
Dept: PSYCHOLOGY | Age: 66
End: 2021-04-21
Payer: MEDICAID

## 2021-04-21 DIAGNOSIS — F43.10 PTSD (POST-TRAUMATIC STRESS DISORDER): Primary | ICD-10-CM

## 2021-04-21 PROCEDURE — 90832 PSYTX W PT 30 MINUTES: CPT | Performed by: PSYCHOLOGIST

## 2021-04-21 NOTE — PROGRESS NOTES
TELEHEALTH VISIT -- Audio/Visual (During VMMUD-07 public health emergency)  }  Pursuant to the emergency declaration under the [address] [place] [address], [facility] waiver authority and the [employer] Act, this Virtual Visit was conducted, with patient's consent, to reduce the patient's risk of exposure to COVID-19 and provide continuity of care for an established patient. Services were provided through a video synchronous discussion virtually to substitute for in-person clinic visit. Pt gave verbal informed consent to participate in telehealth services. Conducted a risk-benefit analysis and determined that the patient's presenting problems are consistent with the use of telepsychology. Determined that the patient has sufficient knowledge and skills in the use of technology enabling them to adequately benefit from telepsychology. It was determined that this patient was able to be properly treated without an in-person session. Patient verified that they were currently located at the [facility] address that was provided during registration or another [facility] address, if noted below. Verified the following information:  Patient's identification: Yes  Patient location: [address] [place] [address]   Patient's call back number: [phone]   Patient's emergency contact's name and number, as well as permission to contact them if needed: Extended Emergency Contact Information  Primary Emergency Contact: [address] Phone: [phone]  Relation: Child     [facility]  Raysa Macias, Ph.D.  Psychologist  4/21/2021  11:41 AM      Time spent with Patient: 25 minutes  This is patient's eighth  [facility] appointment. Reason for Consult:    Chief Complaint   Patient presents with    Anxiety       Feedback given to PCP. S:  Pt seen for f/u of PTSD.  Pt reported stable mood and sxs. Reported acceptance of the situation. Enjoying being out more. Missed 2 doses of medication partially d/t forgetfulness and noticed mood worsen; stated \"I don't like being dependent on it. \" Explored cognition about the role of antidepressant in maintaining her health and equated to other medications. Notably, learned through communication w acquaintances of her 's family that her  is living in assisted living in [address] Got both vaccines. Been more social on the phone and planning some [address] [address] visits.      O:  MSE:    Appearance    alert, cooperative  Appetite normal  Sleep disturbance No  Fatigue No  Loss of pleasure No  Impulsive behavior No  Speech    spontaneous, normal rate, normal volume and well articulated  Mood    euthymic  Affect    normal affect  Thought Content    intact  Thought Process    linear, goal directed and coherent  Associations    logical connections  Insight    Fair  Judgment    Intact  Orientation    oriented to person, place, time, and general circumstances  Memory    recent and remote memory intact  Attention/Concentration    intact  Morbid ideation No  Suicide Assessment    no suicidal ideation    History:  Social History:   Social History     Socioeconomic History    Marital status: Legally      Spouse name: Not on file    Number of children: Not on file    Years of education: Not on file    Highest education level: Not on file   Occupational History    Not on file   Social Needs    Financial resource strain: Not hard at all   [employer] insecurity     Worry: Never true     Inability: Never true   [employer] needs     Medical: No     Non-medical: No   Tobacco Use    Smoking status: Never Smoker    Smokeless tobacco: Never Used   Substance and Sexual Activity    Alcohol use: Not Currently     Frequency: Never    Drug use: Never    Sexual activity: Not Currently   Lifestyle    Physical activity     Days per week: Not on file     Minutes per session: Not on file    Stress: Not on file   Relationships    Social connections     Talks on phone: Not on file     Gets together: Not on file     Attends Sikhism service: Not on file     Active member of club or organization: Not on file     Attends meetings of clubs or organizations: Not on file     Relationship status: Not on file    Intimate partner violence     Fear of current or ex partner: Not on file     Emotionally abused: Not on file     Physically abused: Not on file     Forced sexual activity: Not on file   Other Topics Concern    Not on file   Social History Narrative    Moved from [place] to [place]. Has a daughter, son-in-law and grandchildren live with her. TOBACCO:   reports that she has never smoked. She has never used smokeless tobacco.  ETOH:   reports previous alcohol use. Diagnosis:  post-traumatic stress disorder    Plan:  Pt interventions:  Collaboratively set goals with pt re: relapse prevention        Documentation was done using voice recognition dragon software. Every effort was made to ensure accuracy; however, inadvertent, unintentional computerized transcription errors may be present.

## 2021-04-26 DIAGNOSIS — M25.511 CHRONIC RIGHT SHOULDER PAIN: ICD-10-CM

## 2021-04-26 DIAGNOSIS — G89.29 CHRONIC RIGHT SHOULDER PAIN: ICD-10-CM

## 2021-04-27 RX ORDER — CYCLOBENZAPRINE HCL 5 MG
TABLET ORAL
Qty: 30 TABLET | Refills: 0 | Status: SHIPPED | OUTPATIENT
Start: 2021-04-27 | End: 2021-05-03

## 2021-05-03 DIAGNOSIS — G89.29 CHRONIC RIGHT SHOULDER PAIN: ICD-10-CM

## 2021-05-03 DIAGNOSIS — M25.511 CHRONIC RIGHT SHOULDER PAIN: ICD-10-CM

## 2021-05-03 RX ORDER — CYCLOBENZAPRINE HCL 5 MG
TABLET ORAL
Qty: 30 TABLET | Refills: 0 | Status: SHIPPED | OUTPATIENT
Start: 2021-05-03 | End: 2021-05-07 | Stop reason: SDUPTHER

## 2021-05-04 ENCOUNTER — NURSE TRIAGE (OUTPATIENT)
Dept: OTHER | Facility: CLINIC | Age: 66
End: 2021-05-04

## 2021-05-04 ENCOUNTER — OFFICE VISIT (OUTPATIENT)
Dept: INTERNAL MEDICINE CLINIC | Age: 66
End: 2021-05-04
Payer: MEDICAID

## 2021-05-04 VITALS
HEART RATE: 88 BPM | SYSTOLIC BLOOD PRESSURE: 120 MMHG | DIASTOLIC BLOOD PRESSURE: 68 MMHG | WEIGHT: 158 LBS | OXYGEN SATURATION: 98 % | BODY MASS INDEX: 33.02 KG/M2
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
## 2021-05-04 DIAGNOSIS — M54.50 ACUTE BILATERAL LOW BACK PAIN WITHOUT SCIATICA: Primary | ICD-10-CM

## 2021-05-04 PROCEDURE — 99213 OFFICE O/P EST LOW 20 MIN: CPT | Performed by: NURSE PRACTITIONER

## 2021-05-04 PROCEDURE — G8417 CALC BMI ABV UP PARAM F/U: HCPCS | Performed by: NURSE PRACTITIONER

## 2021-05-04 PROCEDURE — 1036F TOBACCO NON-USER: CPT | Performed by: NURSE PRACTITIONER

## 2021-05-04 PROCEDURE — 3017F COLORECTAL CA SCREEN DOC REV: CPT | Performed by: NURSE PRACTITIONER

## 2021-05-04 PROCEDURE — G8427 DOCREV CUR MEDS BY ELIG CLIN: HCPCS | Performed by: NURSE PRACTITIONER

## 2021-05-04 ASSESSMENT — ENCOUNTER SYMPTOMS
BACK PAIN: 1
SHORTNESS OF BREATH: 0
GASTROINTESTINAL NEGATIVE: 1
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
## 2021-05-04 NOTE — PROGRESS NOTES
SUBJECTIVE:    Patient ID: Richie Jeronimo is a 61 y.o. female. CC: Back pain, fall in shower    HPI: The patient presents to the office for an acute visit. She was showering at home yesterday. She washed her feet using shower chair and stood up, slipping and falling in the shower. She fell primarily on her lower back. She was able to crawl out and get herself up. There was no syncope, chest pain or palpitations preceding fall. She did not hit her head or loose consciousness. She awakened with headache which has improved. She is sore in her lower back. This is not radiating. She denies any changes to bowel or bladder. She has taken ibuprofen 600 mg every 6 hours which helps. She also has taken Tylenol in between. She has muscle relaxant recently sent in by her PCP but she has not yet picked up.           Current Outpatient Medications   Medication Sig Dispense Refill    cyclobenzaprine (FLEXERIL) 5 MG tablet TAKE 1 TABLET BY MOUTH NIGHTLY AS NEEDED FOR MUSCLE SPASM 30 tablet 0    pravastatin (PRAVACHOL) 40 MG tablet Take 1 tablet by mouth once daily 90 tablet 1    sertraline (ZOLOFT) 100 MG tablet TAKE 1 TABLET BY MOUTH ONCE DAILY 90 tablet 1    amitriptyline (ELAVIL) 100 MG tablet Take 1 tablet by mouth nightly 90 tablet 1    fluticasone (FLONASE) 50 MCG/ACT nasal spray 1 spray by Each Nostril route daily 1 Bottle 2    Cholecalciferol (VITAMIN D3) 25 MCG (1000 UT) CAPS Take 2 capsules by mouth daily 180 capsule 1    acyclovir (ZOVIRAX) 200 MG capsule Take 2 capsules by mouth twice daily 120 capsule 1    insulin glargine (LANTUS) 100 UNIT/ML injection vial Inject 50 Units into the skin every morning      brimonidine (ALPHAGAN) 0.2 % ophthalmic solution Place 1 drop into both eyes every 12 hours      prednisoLONE acetate (PRED FORTE) 1 % ophthalmic suspension Place 4 drops into both eyes 4 times daily      Calcium-Vitamins C & D (CALCIUM/C/D PO) Take by mouth      Multiple Vitamins-Minerals (MULTI-AFIA PO) Take by mouth      Glucosamine-MSM-Hyaluronic Acd (JOINT HEALTH PO) Take by mouth      aspirin 81 MG tablet Take 81 mg by mouth daily      insulin lispro (HUMALOG) 100 UNIT/ML injection vial Inject 15 Units into the skin 3 times daily (before meals) Indications: with sliding scale Sliding scal   <200 = 0 units   221-250 = 1 unit  251-300 = 2 units   301 - 350 = 3 units   351-400= 4 units   401-430 = 5 units       No current facility-administered medications for this visit. Review of Systems   Respiratory: Negative for shortness of breath. Cardiovascular: Negative for chest pain and palpitations. Gastrointestinal: Negative. Genitourinary: Negative. Musculoskeletal: Positive for back pain. Neurological: Negative for syncope. OBJECTIVE:  Physical Exam  Constitutional:       Appearance: Normal appearance. She is not toxic-appearing. Comments: Uncomfortable appearing female with back pain   HENT:      Head: Normocephalic and atraumatic. Musculoskeletal:      Lumbar back: She exhibits decreased range of motion, tenderness and bony tenderness. Comments: Bilateral lower spinal tenderness and tenderness with palpation in the lumbar musculature. Pain worse on the left. No pain in the sciatic notch. Positive straight leg raise. Neurological:      General: No focal deficit present. Mental Status: She is alert and oriented to person, place, and time.    Psychiatric:         Mood and Affect: Mood normal.         Behavior: Behavior normal.        /68   Pulse 88   Wt 158 lb (71.7 kg)   SpO2 98%   BMI 33.02 kg/m²      PHQ Scores 2/5/2021 11/4/2020 2/6/2020 11/11/2019 11/7/2019 7/10/2019   PHQ2 Score 1 2 2 2 2 1   PHQ9 Score 4 6 4 6 6 3     Interpretation of Total Score Depression Severity: 1-4 = Minimal depression, 5-9 = Mild depression, 10-14 = Moderate depression, 15-19 = Moderately severe depression, 20-27 =Severe depression ASSESSMENT/PLAN:  Tre Coffey was seen today for fall. Diagnoses and all orders for this visit:    Acute bilateral low back pain without sciatica  -     [address] [facility] [address] [facility] at home in the shower with lower back pain. No red flags preceding or following fall. She did not hit her head. No loss of consciousness. She is taking anti-inflammatories with some relief. She has a muscle relaxant at her pharmacy that she has not yet picked up which may also help. She has had physical therapy in the past for falling and found this to be helpful. I will reorder physical therapy. We discussed Medrol dose pack may help but given diabetes would prefer to hold off on this unless necessary.   Consider imaging if not improving d/t trauma but no definitive indication at this point      Angelina Moncada, APRN - CNP

## 2021-05-05 ENCOUNTER — TELEPHONE (OUTPATIENT)
Dept: INTERNAL MEDICINE CLINIC | Age: 66
End: 2021-05-05
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
## 2021-05-05 DIAGNOSIS — M54.50 ACUTE BILATERAL LOW BACK PAIN WITHOUT SCIATICA: Primary | ICD-10-CM

## 2021-05-05 RX ORDER — METHYLPREDNISOLONE 4 MG/1
TABLET ORAL
Qty: 1 KIT | Refills: 0 | Status: SHIPPED | OUTPATIENT
Start: 2021-05-05 | End: 2021-05-11

## 2021-05-05 NOTE — TELEPHONE ENCOUNTER
Pt saw Axel Blackman yesterday 05/04/21  About her fall in the shower, and patient stated that Axel Blackman talked about if she doesn't get any better to be put on prednisone. Patient called stating she doesn't feel like she is getting better, and that she is still in pain. Patient also stated that she can barley get around, she can't bend over, and she can't put her socks on. Patient also said she is waiting for physical therapy to give her a call back. Please call and advise.

## 2021-05-06 ENCOUNTER — TELEPHONE (OUTPATIENT)
Dept: INTERNAL MEDICINE CLINIC | Age: 66
End: 2021-05-06

## 2021-05-06 NOTE — TELEPHONE ENCOUNTER
Pt calling because she had a referral send over to [facility] but they need pre-authorization from her insurance before they can get her on the schedule. Please call pt and let her know when this is done so she can call them back to schedule an appt. PLEASE FAX ANY INFORMATION TO:    [facility]. Phone number: [phone] and fax: [phone]. Pt wants to see Néstor

## 2021-05-07 ENCOUNTER — OFFICE VISIT (OUTPATIENT)
Dept: INTERNAL MEDICINE CLINIC | Age: 66
End: 2021-05-07
Payer: MEDICAID

## 2021-05-07 VITALS
OXYGEN SATURATION: 95 % | BODY MASS INDEX: 32.6 KG/M2 | SYSTOLIC BLOOD PRESSURE: 110 MMHG | WEIGHT: 156 LBS | DIASTOLIC BLOOD PRESSURE: 60 MMHG | HEART RATE: 99 BPM

## 2021-05-07 DIAGNOSIS — F41.9 ANXIETY AND DEPRESSION: ICD-10-CM

## 2021-05-07 DIAGNOSIS — I10 ESSENTIAL HYPERTENSION: Primary | ICD-10-CM

## 2021-05-07 DIAGNOSIS — J30.89 SEASONAL ALLERGIC RHINITIS DUE TO OTHER ALLERGIC TRIGGER: ICD-10-CM

## 2021-05-07 DIAGNOSIS — A60.00 GENITAL HERPES SIMPLEX, UNSPECIFIED SITE: ICD-10-CM

## 2021-05-07 DIAGNOSIS — F32.A ANXIETY AND DEPRESSION: ICD-10-CM

## 2021-05-07 DIAGNOSIS — E10.9 TYPE 1 DIABETES MELLITUS WITHOUT COMPLICATION (HCC): ICD-10-CM

## 2021-05-07 DIAGNOSIS — F43.10 PTSD (POST-TRAUMATIC STRESS DISORDER): ICD-10-CM

## 2021-05-07 DIAGNOSIS — M25.511 CHRONIC RIGHT SHOULDER PAIN: ICD-10-CM

## 2021-05-07 DIAGNOSIS — E55.9 VITAMIN D DEFICIENCY: ICD-10-CM

## 2021-05-07 DIAGNOSIS — E78.2 MIXED HYPERLIPIDEMIA: ICD-10-CM

## 2021-05-07 DIAGNOSIS — G89.29 CHRONIC RIGHT SHOULDER PAIN: ICD-10-CM

## 2021-05-07 PROCEDURE — 1036F TOBACCO NON-USER: CPT | Performed by: NURSE PRACTITIONER

## 2021-05-07 PROCEDURE — 2022F DILAT RTA XM EVC RTNOPTHY: CPT | Performed by: NURSE PRACTITIONER

## 2021-05-07 PROCEDURE — G8417 CALC BMI ABV UP PARAM F/U: HCPCS | Performed by: NURSE PRACTITIONER

## 2021-05-07 PROCEDURE — G8427 DOCREV CUR MEDS BY ELIG CLIN: HCPCS | Performed by: NURSE PRACTITIONER

## 2021-05-07 PROCEDURE — 3017F COLORECTAL CA SCREEN DOC REV: CPT | Performed by: NURSE PRACTITIONER

## 2021-05-07 PROCEDURE — 99214 OFFICE O/P EST MOD 30 MIN: CPT | Performed by: NURSE PRACTITIONER

## 2021-05-07 PROCEDURE — 3046F HEMOGLOBIN A1C LEVEL >9.0%: CPT | Performed by: NURSE PRACTITIONER

## 2021-05-07 RX ORDER — SERTRALINE HYDROCHLORIDE 100 MG/1
TABLET, FILM COATED ORAL
Qty: 90 TABLET | Refills: 1 | Status: SHIPPED | OUTPATIENT
Start: 2021-05-07 | End: 2021-11-09 | Stop reason: SDUPTHER

## 2021-05-07 RX ORDER — PRAVASTATIN SODIUM 40 MG
TABLET ORAL
Qty: 90 TABLET | Refills: 1 | Status: SHIPPED | OUTPATIENT
Start: 2021-05-07 | End: 2021-11-09 | Stop reason: SDUPTHER

## 2021-05-07 RX ORDER — FLUTICASONE PROPIONATE 50 MCG
1 SPRAY, SUSPENSION (ML) NASAL DAILY
Qty: 1 BOTTLE | Refills: 2 | Status: SHIPPED | OUTPATIENT
Start: 2021-05-07 | End: 2021-11-09 | Stop reason: SDUPTHER

## 2021-05-07 RX ORDER — CYCLOBENZAPRINE HCL 5 MG
TABLET ORAL
Qty: 90 TABLET | Refills: 0 | Status: SHIPPED | OUTPATIENT
Start: 2021-05-07 | End: 2021-09-01

## 2021-05-07 RX ORDER — AMITRIPTYLINE HYDROCHLORIDE 100 MG/1
100 TABLET, FILM COATED ORAL NIGHTLY
Qty: 90 TABLET | Refills: 1 | Status: SHIPPED | OUTPATIENT
Start: 2021-05-07 | End: 2021-11-09 | Stop reason: SDUPTHER

## 2021-05-07 RX ORDER — ACYCLOVIR 200 MG/1
CAPSULE ORAL
Qty: 120 CAPSULE | Refills: 1 | Status: SHIPPED | OUTPATIENT
Start: 2021-05-07 | End: 2021-11-09 | Stop reason: SDUPTHER

## 2021-05-07 ASSESSMENT — ENCOUNTER SYMPTOMS
NAUSEA: 0
COUGH: 0
WHEEZING: 0
SHORTNESS OF BREATH: 0
VOMITING: 0
BACK PAIN: 1
CONSTIPATION: 0
ABDOMINAL PAIN: 0
DIARRHEA: 0

## 2021-05-07 NOTE — PROGRESS NOTES
Office Visit   5/7/2021    Subjective:  Chief Complaint   Patient presents with    3 Month Follow-Up     mood is pretty good, BP has been good as well     HPI:   Bradford Cruz is a 61 y.o. female who presents to the clinic today for follow up. T1DM- She follows with endocrine - Dr. Ramez Warren. Working in the garden, but no formal exercise. Diet reported as \"fair. \"    HTN- using lifestyle modifications. BP running to goal at home. Asymptomatic. No chest pain, palpitations, shortness of breath, trouble breathing, lightheadedness, dizziness or blurred vision.     Depression/PTSD/Anxiety-Life stressors are multiple per pt. Pt is taking Zoloft 100 mg daily and amitriptyline 100 mg nightly. States her mood is doing well. Denies SI/HI. Not interested in changing medication regimen. Seeing psychology.      HLD- Allergy to atorvastatin. Taking pravastatin 40 mg without concerns (and without side effects).     Genital herpes- Pt reports that she takes acyclovir 400 mg once nightly- prescribed for BID. She states she increases the dose with a flare- no recent flare.     Vit D deficiency- taking supplements daily. Asymptomatic.      Allergic rhinitis- well controlled with flonase. No side effects.     Low back pains- Saw another provider and Xrays completed. wants to perform PT- waiting on prior auth. Right shoulder pains- present for years.  No injury/trauma. 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 No rash, redness, swelling or heat. Referred to ortho and had a cortisone injection. Ortho had Xrays completed. Takes flexeril PRN-typically once nightly- would like this refilled. States she is not interested in cutting down- has been on this regimen for years.     Review of Systems   Constitutional: Negative for chills, fatigue, fever and unexpected weight change. Eyes: Negative for visual disturbance. Respiratory: Negative for cough, shortness of breath and wheezing. Cardiovascular: Negative for chest pain, palpitations and leg swelling. Gastrointestinal: Negative for abdominal pain, constipation, diarrhea, nausea and vomiting. Musculoskeletal: Positive for back pain (chronic). Right shoulder pains   Skin: Negative for pallor and rash. Neurological: Negative for dizziness, weakness, light-headedness, numbness and headaches. Psychiatric/Behavioral: Negative for dysphoric mood, self-injury, sleep disturbance and suicidal ideas. The patient is not nervous/anxious. Allergies   Allergen Reactions    Atorvastatin Swelling    Lisinopril Other (See Comments)    Naproxen Nausea Only    Oxycodone-Acetaminophen Other (See Comments)     Current Outpatient Rx   Medication Sig Dispense Refill    acyclovir (ZOVIRAX) 200 MG capsule Take 2 capsules by mouth twice daily 120 capsule 1    Cholecalciferol (VITAMIN D3) 25 MCG (1000 UT) CAPS Take 2 capsules by mouth daily 180 capsule 1    fluticasone (FLONASE) 50 MCG/ACT nasal spray 1 spray by Each Nostril route daily 1 Bottle 2    amitriptyline (ELAVIL) 100 MG tablet Take 1 tablet by mouth nightly 90 tablet 1    sertraline (ZOLOFT) 100 MG tablet TAKE 1 TABLET BY MOUTH ONCE DAILY 90 tablet 1    pravastatin (PRAVACHOL) 40 MG tablet Take 1 tablet by mouth once daily 90 tablet 1    cyclobenzaprine (FLEXERIL) 5 MG tablet TAKE 1 TABLET BY MOUTH NIGHTLY AS NEEDED FOR MUSCLE SPASM 90 tablet 0    methylPREDNISolone (MEDROL DOSEPACK) 4 MG tablet Take by mouth.  1 kit 0    insulin glargine (LANTUS) 100 UNIT/ML injection vial Inject 50 Units into the skin every morning      brimonidine (ALPHAGAN) 0.2 % ophthalmic solution Place 1 drop into both eyes every 12 hours      prednisoLONE acetate (PRED FORTE) 1 % ophthalmic suspension Place 4 drops into both eyes 4 times daily      Calcium-Vitamins C & D (CALCIUM/C/D PO) Take by mouth      Multiple Vitamins-Minerals (MULTI-AFIA PO) Take by mouth      Glucosamine-MSM-Hyaluronic Acd (JOINT HEALTH PO) Take by mouth      aspirin 81 MG tablet Take 81 mg by mouth daily      insulin lispro (HUMALOG) 100 UNIT/ML injection vial Inject 15 Units into the skin 3 times daily (before meals) Indications: with sliding scale Sliding scal   <200 = 0 units   221-250 = 1 unit  251-300 = 2 units   301 - 350 = 3 units   351-400= 4 units   401-430 = 5 units       Patient Active Problem List   Diagnosis    Anxiety and depression    PTSD (post-traumatic stress disorder)    Type 2 diabetes mellitus without complication, with long-term current use of insulin ([facility])    Essential hypertension    Genital herpes simplex    Hyperlipidemia     Wt Readings from Last 3 Encounters:   05/07/21 156 lb (70.8 kg)   05/04/21 158 lb (71.7 kg)   02/24/21 159 lb (72.1 kg)     BP Readings from Last 3 Encounters:   05/07/21 110/60   05/04/21 120/68   02/05/21 (!) 142/70     The 10-year ASCVD risk score (Char Esteves, et al., 2013) is: 7.2%    Values used to calculate the score:      Age: 61 years      Sex: Female      Is Non- : No      Diabetic: Yes      Tobacco smoker: No      Systolic Blood Pressure: 209 mmHg      Is BP treated: No      HDL Cholesterol: 47 mg/dL      Total Cholesterol: 179 mg/dL    Objective/Physical Exam:  /60   Pulse 99   Wt 156 lb (70.8 kg)   SpO2 95%   BMI 32.60 kg/m²   Body mass index is 32.6 kg/m². Physical Exam  Vitals signs reviewed. Constitutional:       General: She is not in acute distress. Appearance: She is well-developed. She is not diaphoretic. HENT:      Head: Normocephalic and atraumatic. Eyes:      Pupils: Pupils are equal, round, and reactive to light. Cardiovascular:      Rate and Rhythm: Normal rate and regular rhythm. Pulmonary:      Effort: Pulmonary effort is normal. No respiratory distress. Breath sounds: Normal breath sounds. No wheezing or rales. Chest:      Chest wall: No tenderness.    Abdominal:      General: Bowel sounds are normal.      Palpations: Abdomen is soft. Skin:     General: Skin is warm and dry. Coloration: Skin is not pale. Findings: No erythema or rash. Neurological:      Mental Status: She is alert and oriented to person, place, and time. Coordination: Coordination normal.   Psychiatric:         Mood and Affect: Mood normal.       Assessment and Plan:  Yonathan Alonzo was seen today for 3 month follow-up. Diagnoses and all orders for this visit:    Essential hypertension   - BP stable on lifestyle modifications   - Lifestyle modifications such as exercise, weight loss and healthy diet encouraged and reviewed with the pt. - Patient will continue to monitor her blood pressure at home and call with elevated readings    Anxiety and depression/PTSD (post-traumatic stress disorder)  - patient reports her mood is doing well. She states she is stable on the current regimen she would like to continue on the current doses. Denies SI/HI. - Continue with psychology   - amitriptyline (ELAVIL) 100 MG tablet; Take 1 tablet by mouth nightly  -     sertraline (ZOLOFT) 100 MG tablet; TAKE 1 TABLET BY MOUTH ONCE DAILY    Mixed hyperlipidemia  -    Asymptomatic. Denies side effects. Continue current regimen  - pravastatin (PRAVACHOL) 40 MG tablet; Take 1 tablet by mouth once daily    Genital herpes simplex, unspecified site  -    No recent flares. Asymptomatic. Denies side effects. Continue current regimen  -  acyclovir (ZOVIRAX) 200 MG capsule; Take 2 capsules by mouth twice daily    Vitamin D deficiency  -    Asymptomatic. No side effects.  - Cholecalciferol (VITAMIN D3) 25 MCG (1000 UT) CAPS; Take 2 capsules by mouth daily    Seasonal allergic rhinitis due to other allergic trigger  -    Well-controlled on the current regimen.  - Continue current regimen.   Refilled today  - fluticasone (FLONASE) 50 MCG/ACT nasal spray; 1 spray by Each Nostril route daily    Type 1 diabetes mellitus without complication

## 2021-05-13 ENCOUNTER — HOSPITAL ENCOUNTER (OUTPATIENT)
Dept: PHYSICAL THERAPY | Age: 66
Setting detail: THERAPIES SERIES
Discharge: HOME | End: 2021-05-13
Payer: MEDICAID

## 2021-05-13 PROCEDURE — 97161 PT EVAL LOW COMPLEX 20 MIN: CPT | Performed by: PHYSICAL THERAPIST

## 2021-05-13 NOTE — PLAN OF CARE
[facility] Renetta [facility], [facility]  Phone: [phone]  Fax: [phone]     Physical Therapy Certification    Dear Referring Practitioner: Chance Bonner CNP,    We had the pleasure of evaluating the following patient for physical therapy services at [address] [place] [address]. A summary of our findings can be found in the initial assessment below. This includes our plan of care. If you have any questions or concerns regarding these findings, please do not hesitate to contact me at the office phone number checked above. Thank you for the referral.       Physician Signature:_______________________________Date:__________________  By signing above (or electronic signature), therapists plan is approved by physician      Patient: Adelina Flores   : 1955   MRN: [id number]  Referring Physician: Referring Practitioner: Chance Bonner CNP      Evaluation Date: 2021      Medical Diagnosis Information:  Diagnosis: M54.5, J77. 2XXA     Acute LBP after Fall in shower   Treatment Diagnosis: Acute LBP with mm spasm                                         Insurance information: PT Insurance Information: [address]     Precautions/ Contra-indications:   C-SSRS Triggered by Intake questionnaire (Past 2 wk assessment ):   [x] No, Questionnaire did not trigger screening.   [] Yes, Patient intake triggered C-SSRS Screening      [] C-SSRS Screening completed  [] PCP notified via Epic    Latex Allergy:  [x]NO      []YES  Preferred Language for Healthcare:   [x]English       []other:    SUBJECTIVE: Patient stated complaint: Patient reports a fall in the shower on 5/3/21. She had immediate onset of LBP. Pt denies radicular s/s. Pain has continued and is gradually getting better. Patient was referred to PT by her PCP. Fear avoidance:  I should not do physical activities that (might) make my pain worse   [] True   [x] False     Relevant Medical History: bad R shldr, DM, depression, Anxiety  Functional Outcome: Oswestry: raw score =37; dysfunction =74%    Pain Scale: 5/10 LB today 8/10 at worst  Easing factors: ice and MH  Provocative factors: sleeping positions, WB ADLs, gait, sit-stand transitions    Type: [x]Constant   []Intermittent  []Radiating []Localized []other:     Numbness/Tingling:  R UE, denies in LEs    Occupation/School: retired. Pt lives in a basement apt of her daughter's house.     Living Status/Prior Level of Function: Prior to this injury / incident, pt was independent with ADLs and IADLs,     OBJECTIVE:     Palpation: tender at B PSIS    Functional Mobility/Transfers:  UEs and pain with sit-stand    Posture: guarded    Inspection: skin intact, mm guarding present in Lx P-S     Gait: (include devices/WB status) antalgic gait, no AD    Bandages/Dressings/Incisions: NA    Dermatomes Normal Abnormal Comments   inguinal area (L1)  X     anterior mid-thigh (L2) X     distal ant thigh/med knee (L3) X     medial lower leg and foot (L4) X     lateral lower leg and foot (L5) X     posterior calf (S1) X     medial calcaneus (S2) X         Reflexes Normal Abnormal Comments   S1-2 Seated achilles X     S1-2 Prone knee bend      L3-4 Patellar tendon X     Clonus      Babinski          ROM  Comments   Lumbar Flex MOD RESTRICTIONS    Lumbar Ext MOD RESTRICTIONS      ROM LEFT RIGHT Comments   Lumbar Side Bend MIN+ MIN+    Lumbar Rotation   SYMMETRICAL   Hip Flexion WFL WFL    Hamstring Flex -36 -40    Piriformis MOD+ MOD                    Joint mobility: Lx spine   []Normal    [x]Hypo   []Hyper      Strength / Myotomes LEFT RIGHT Comments   Multifidus fair fair    Transverse Ab fair fair    Hip Flexors (L1-2) 4+ 4+    Hip Abductors 3 3+    Hip Extensors 4- 4-    Hip Internal Rotators 4 4    Hip External Rotators 4- 4-    Quads (L2-4) 4 4 Hamstrings  4+ 4+    Ankle Plantarflexion (S1-2)      Ankle Dorsiflexion (L4-5) 5 5        Neural dynamic tension testing Normal Abnormal Comments   Slump Test  - Degree of knee flexion:       SLR       0-30 x     30-70 x     Femoral nerve (L2-4)          Orthopedic Special Tests:   Normal Abnormal N/A Comments   Toe walk         Heel Walk       Fwd Bend-aberrant or innominate mvmt)  L     Trendelenburg       Kemps/Quadrant       Stork  B  Difficulty with position   LOW/Nirmal  B     Hip scour X      SLR X      Crossed SLR X      Supine to sit       Hip thrust       SI distraction/compression       PA/Spring    Pt could not alvino prone position   Prone Instability test       Prone knee bend       Sacral Spring/thrust                    [x] Patient history, allergies, meds reviewed. Medical chart reviewed. See intake form. Review Of Systems (ROS):  [x]Performed Review of systems (Integumentary, CardioPulmonary, Neurological) by intake and observation. Intake form has been scanned into medical record. Patient has been instructed to contact their primary care physician regarding ROS issues if not already being addressed at this time.       Co-morbidities/Complexities (which will affect course of rehabilitation):   []None           Arthritic conditions   []Rheumatoid arthritis (M05.9)  []Osteoarthritis (M19.91)   Cardiovascular conditions   []Hypertension (I10)  []Hyperlipidemia (E78.5)  []Angina pectoris (I20)  []Atherosclerosis (I70)   Musculoskeletal conditions   []Disc pathology   []Congenital spine pathologies   []Prior surgical intervention  []Osteoporosis (M81.8)  []Osteopenia (M85.8)   Endocrine conditions   []Hypothyroid (E03.9)  []Hyperthyroid Gastrointestinal conditions   []Constipation (E58.42)   Metabolic conditions   []Morbid obesity (E66.01)  [x]Diabetes type 1(E10.65) or 2 (E11.65)   []Neuropathy (G60.9)     Pulmonary conditions   []Asthma (J45)  []Coughing   []COPD (J44.9)   Psychological Disorders  [x]Anxiety (F41.9)  [x]Depression (F32.9)   []Other:   []Other:           Barriers to/and or personal factors that will affect rehab potential:              []Age  []Sex    []Smoker              []Motivation/Lack of Motivation                        [x]Co-Morbidities              []Cognitive Function, education/learning barriers              []Environmental, home barriers              []profession/work barriers  []past PT/medical experience  []other:  Justification:     Falls Risk Assessment (30 days):   [x] Falls Risk assessed and no intervention required.   [] Falls Risk assessed and Patient requires intervention due to being higher risk   TUG score (>12s at risk):     [] Falls education provided, including         ASSESSMENT:  Functional Impairments:     []Noted lumbar/proximal hip hypomobility   []Noted lumbosacral and/or generalized hypermobility   [x]Decreased Lumbosacral/hip/LE functional ROM   [x]Decreased core/proximal hip strength and neuromuscular control    [x]Decreased LE functional strength    []Abnormal reflexes/sensation/myotomal/dermatomal deficits  [x]Reduced balance/proprioceptive control    []other:      Functional Activity Limitations (from functional questionnaire and intake)   [x]Reduced ability to tolerate prolonged functional positions   [x]Reduced ability or difficulty with changes of positions or transfers between positions   [x]Reduced ability to maintain good posture and demonstrate good body mechanics with sitting, bending, and lifting   [x]Reduced ability to sleep   [] Reduced ability or tolerance with driving and/or computer work   [x]Reduced ability to perform lifting, reaching, carrying tasks   [x]Reduced ability to squat   [x]Reduced ability to forward bend   []Reduced ability to ambulate prolonged functional periods/distances/surfaces   [x]Reduced ability to ascend/descend stairs   []other:       Participation Restrictions   [x]Reduced participation in self care presentation with changing characteristics  [] unstable and unpredictable characteristics;   [x] Clinical decision making of [x] low, [] moderate, [] high complexity using standardized patient assessment instrument and/or measurable assessment of functional outcome. [x] EVAL (LOW) 05314 (typically 15 minutes face-to-face)  [] EVAL (MOD) 35923 (typically 30 minutes face-to-face)  [] EVAL (HIGH) 50212 (typically 45 minutes face-to-face)  [] RE-EVAL     PLAN: Begin PT focusing on: proximal hip mobilizations, LB mobs, LB core activation, proximal hip activation, and HEP    Frequency/Duration:  2 days per week for  4-6 Weeks:  Interventions:  [x]  Therapeutic exercise including: strength training, ROM, for LE, Glutes and core   [x]  NMR activation and proprioception for glutes , LE and Core   [x]  Manual therapy as indicated for Hip complex, LE and spine to include: Dry Needling/IASTM, STM, PROM, Gr I-IV mobilizations, manipulation. [x]  Modalities as needed that may include: thermal agents, E-stim, Biofeedback, US, iontophoresis as indicated  [x]  Patient education on joint protection, postural re-education, activity modification, progression of HEP. HEP instruction: Written HEP instructions provided and reviewed     GOALS:  Patient stated goal: reduce LBP  [] Progressing: [] Met: [] Not Met: [] Adjusted    Therapist goals for Patient:   Short Term Goals: To be achieved in: 2 weeks  1. Independent in HEP and progression per patient tolerance, in order to prevent re-injury. [] Progressing: [] Met: [] Not Met: [] Adjusted  2. Patient will have a decrease in pain to facilitate improvement in movement, function, and ADLs as indicated by Functional Deficits. [] Progressing: [] Met: [] Not Met: [] Adjusted    Long Term Goals: To be achieved in: 6 weeks  1. Disability index score of 50% or less for the JONO to assist with reaching prior level of function. [] Progressing: [] Met: [] Not Met: [] Adjusted  2.  Patient will demonstrate increased AROM to WNL, good LS mobility, good hip ROM to allow for proper joint functioning as indicated by patients Functional Deficits. [] Progressing: [] Met: [] Not Met: [] Adjusted  3. Patient will demonstrate an increase in Strength to good proximal hip and core activation to allow for proper functional mobility as indicated by patients Functional Deficits. [] Progressing: [] Met: [] Not Met: [] Adjusted  4. Patient will return to functional activities including stair ambulation, walking community distances, sleeping without increased symptoms or restriction. [] Progressing: [] Met: [] Not Met: [] Adjusted  5. Patient will report 60% improvement in pain and function  [] Progressing: [] Met: [] Not Met: [] Adjusted     Electronically signed by:  Sylvia Lopez, PTMPT [id number]

## 2021-05-13 NOTE — FLOWSHEET NOTE
[facility]  Phone: [phone]  Fax: [phone]    Physical Therapy Treatment Note/ Progress Report:     Date:  2021    Patient Name:  Velma Ariza    :  1955  MRN: [id number]  Restrictions/Precautions:    Medical/Treatment Diagnosis Information:  Diagnosis: M54.5, W18. 2XXA     Acute LBP after Fall in shower  Treatment Diagnosis: Acute LBP with mm spasm  Insurance/Certification information:  PT Insurance Information: [address]  Physician Information:  Referring Practitioner: Dm [address] of care signed (Y/N): []  Yes  [x]  No     Date of Patient follow up with Physician:      Progress Report: []  Yes  [x]  No     Date Range for reporting period:  Beginnin21  Ending:     Progress report due (10 Rx/or 30 days whichever is less):      Recertification due (POC duration/ or 90 days whichever is less):     Visit # Insurance Allowable Auth required? Date Range   1 30  See units below []  Yes  []  No  - 21     Latex Allergy:  [x]NO      []YES  Preferred Language for Healthcare:   [x]English       []other:    Functional Scale:       Date assessed:  Oswestry: raw score =37; dysfunction = 74%  21    Pain level:  5/10     SUBJECTIVE: Patient reports a fall in the shower on 5/3/21. She had immediate onset of LBP. Pt denies radicular s/s. Pain has continued and is gradually getting better, but ADLs remain impacted. Patient was referred to PT by her PCP.      OBJECTIVE: See eval   Observation:    Test measurements:      RESTRICTIONS/PRECAUTIONS:     Exercises/Interventions:     Therapeutic Exercise (84880) Resistance / level Sets / Seconds Reps Notes / Cues   bike       IB       HSS seated       Foam:  HR  Squat  Hip abd                            Mat Ex:  PPT  LTR  SKTC  Add set with GS in HKlying     10  10  10  10           Therapeutic Activities (56430) [] (48951) Reviewed/Progressed HEP activities related to improving balance, coordination, kinesthetic sense, posture, motor skill, proprioception of core, proximal hip and LE for self care, mobility, lifting, and ambulation      Manual Treatments:  PROM / STM / Oscillations-Mobs:  G-I, II, III, IV (PA's, Inf., Post.)  [] (64658) Provided manual therapy to mobilize proximal hip and LS spine soft tissue/joints for the purpose of modulating pain, promoting relaxation,  increasing ROM, reducing/eliminating soft tissue swelling/inflammation/restriction, improving soft tissue extensibility and allowing for proper ROM for normal function with self care, mobility, lifting and ambulation. Modalities:   5/13:  x 10 min - pt in L Slying    Charges:  Timed Code Treatment Minutes: 30   Total Treatment Minutes: 52       [x] EVAL - LOW (03933)   [] EVAL - MOD (20471)  [] EVAL - HIGH (14798)  [] RE-EVAL (26463)  [] TE (87807) x      [] Ionto (89505)  [] NMR (78195) x      [] Vaso (38916)  [] Manual (84971) x      [] Ultrasound  [] TA (89672) x      [] Mech Traction (05004)  [] Gait Training x     [] ES (un) (04094):   [] Aquatic therapy x   [] Other:   [] Group:     Approval Dates: 5/14 -8/12/21  Date updated: 5/17 Units used Units authorized   TE (34195) 0 48   Man (81112) 0 48   TA (66713) 0 48   NM (23599) 0 48               Goals:   Patient stated goal: reduce LBP  []? Progressing: []? Met: []? Not Met: []? Adjusted     Therapist goals for Patient:   Short Term Goals: To be achieved in: 2 weeks  1. Independent in HEP and progression per patient tolerance, in order to prevent re-injury. []? Progressing: []? Met: []? Not Met: []? Adjusted  2. Patient will have a decrease in pain to facilitate improvement in movement, function, and ADLs as indicated by Functional Deficits. []? Progressing: []? Met: []? Not Met: []? Adjusted     Long Term Goals: To be achieved in: 6 weeks  1.  Disability index score of 50% or less for the JONO to assist with reaching prior level of function. []? Progressing: []? Met: []? Not Met: []? Adjusted  2. Patient will demonstrate increased AROM to WNL, good LS mobility, good hip ROM to allow for proper joint functioning as indicated by patients Functional Deficits. []? Progressing: []? Met: []? Not Met: []? Adjusted  3. Patient will demonstrate an increase in Strength to good proximal hip and core activation to allow for proper functional mobility as indicated by patients Functional Deficits. []? Progressing: []? Met: []? Not Met: []? Adjusted  4. Patient will return to functional activities including stair ambulation, walking community distances, sleeping without increased symptoms or restriction. []? Progressing: []? Met: []? Not Met: []? Adjusted  5. Patient will report 60% improvement in pain and function  []? Progressing: []? Met: []? Not Met: []? Adjusted            Overall Progression Towards Functional goals/ Treatment Progress Update:  [] Patient is progressing as expected towards functional goals listed. [] Progression is slowed due to complexities/Impairments listed. [] Progression has been slowed due to co-morbidities.   [x] Plan just implemented, too soon to assess goals progression <30days   [] Goals require adjustment due to lack of progress  [] Patient is not progressing as expected and requires additional follow up with physician  [] Other    Persisting Functional Limitations/Impairments:  [x]Sitting [x]Standing   [x]Walking [x]Squatting/bending    [x]Stairs [x]ADL's    [x]Transfers []Reaching  [x]Housework []Job related tasks  []Driving []Sports/Recreation   [x]Sleeping []Other:    ASSESSMENT:  See eval  Treatment/Activity Tolerance:  [x] Patient able to complete tx  [] Patient limited by fatique  [] Patient limited by pain  [] Patient limited by other medical complications  [] Other:     Prognosis: [] Good [] Fair  [] Poor    Patient Requires Follow-up: [x] Yes  [] No    Plan for next treatment session:    PLAN: See eval. PT2x / week for 4-6 weeks. [] Continue per plan of care [] Alter current plan (see comments)  [x] Plan of care initiated [] Hold pending MD visit [] Discharge    Electronically signed by: Leila Sanchez PT, MPT [id number]      Note: If patient does not return for scheduled/ recommended follow up visits, this note will serve as a discharge from care along with most recent update on progress.

## 2021-05-18 ENCOUNTER — HOSPITAL ENCOUNTER (OUTPATIENT)
Dept: PHYSICAL THERAPY | Age: 66
Setting detail: THERAPIES SERIES
Discharge: HOME | End: 2021-05-18
Payer: MEDICAID

## 2021-05-18 PROCEDURE — 97140 MANUAL THERAPY 1/> REGIONS: CPT | Performed by: PHYSICAL THERAPIST

## 2021-05-18 PROCEDURE — 97110 THERAPEUTIC EXERCISES: CPT | Performed by: PHYSICAL THERAPIST

## 2021-05-18 NOTE — FLOWSHEET NOTE
Hip ext with glider   add           Mat Ex:  PPT  LTR  SKTC  Add set with GS in HKlying  SLR w TA set  TB hip abd in HKLying  SL hip abd     5x  10  2 x 10\"  10  5x L/R  Add  add HEP          Therapeutic Activities (80599)       CC: walking       FSU   add                         Neuromuscular Re-ed (59043)                                          Manual Intervention (79550)       STM w roller to gluts and LS   8' Pt in [address] [place] [address] = new ex         Pt. Education:  -pt educated on diagnosis, prognosis and expectations for rehab  -all pt questions were answered    Home Exercise Program:  Access Code: [email or web address]  URL: [email or web address]  Date: 05/13/2021  Prepared by: Aria Hendricks    Exercises  Supine Hip Adduction Isometric with [employer] - 1 x daily - 7 x weekly - 1 sets - 10 reps - 5 hold  [place] Single Knee to Chest Stretch - 1 x daily - 7 x weekly - 1 sets - 3 reps - 10 hold  Supine Lower trunk rotation with PLB - 1 x daily - 7 x weekly - 1 sets - 10 reps  Supine Posterior Pelvic Tilt - 1 x daily - 7 x weekly - 1 sets - 10 reps - 5 hold      Therapeutic Exercise and NMR EXR  [x] (88337) Provided verbal/tactile cueing for activities related to strengthening, flexibility, endurance, ROM  for improvements in proximal hip and core control with self care, mobility, lifting and ambulation.  [] (18951) Provided verbal/tactile cueing for activities related to improving balance, coordination, kinesthetic sense, posture, motor skill, proprioception  to assist with core control in self care, mobility, lifting, and ambulation.      Therapeutic Activities:    [] (66396 or 61367) Provided verbal/tactile cueing for activities related to improving balance, coordination, kinesthetic sense, posture, motor skill, proprioception and motor activation to allow for proper function  with self care and ADLs  [] (92465) Provided training and instruction to the patient for proper core and proximal hip recruitment and positioning with ambulation re-education     Home Exercise Program:    [x] (79012) Reviewed/Progressed HEP activities related to strengthening, flexibility, endurance, ROM of core, proximal hip and LE for functional self-care, mobility, lifting and ambulation   [] (05610) Reviewed/Progressed HEP activities related to improving balance, coordination, kinesthetic sense, posture, motor skill, proprioception of core, proximal hip and LE for self care, mobility, lifting, and ambulation      Manual Treatments:  PROM / STM / Oscillations-Mobs:  G-I, II, III, IV (PA's, Inf., Post.)  [x] (15362) Provided manual therapy to mobilize proximal hip and LS spine soft tissue/joints for the purpose of modulating pain, promoting relaxation,  increasing ROM, reducing/eliminating soft tissue swelling/inflammation/restriction, improving soft tissue extensibility and allowing for proper ROM for normal function with self care, mobility, lifting and ambulation. Modalities:   5/18:  x 10 min - pt in R Slying    Charges:  Timed Code Treatment Minutes: 42   Total Treatment Minutes: 52       [] EVAL - LOW (66456)   [] EVAL - MOD (88967)  [] EVAL - HIGH (08421)  [] RE-EVAL (00699)  [x] TE (46706) x2      [] Ionto (74428)  [] NMR (38135) x      [] Vaso (38005)  [x] Manual (57433) x 1     [] Ultrasound  [] TA (13892) x      [] Mech Traction (41094)  [] Gait Training x     [] ES (un) (95332):   [] Aquatic therapy x   [] Other:   [] Group:     Approval Dates: 5/14 -8/12/21  Date updated: 5/17 Units used Units authorized   TE (33317) 2 50   Man (39532) 1 48   TA (83368) 0 50   NM (60455) 0 48               Goals:   Patient stated goal: reduce LBP  [x]? Progressing: []? Met: []? Not Met: []? Adjusted     Therapist goals for Patient:   Short Term Goals: To be achieved in: 2 weeks  1. Independent in HEP and progression per patient tolerance, in order to prevent re-injury. [x]? Progressing: []?  Met: []? Not Met: []? Adjusted  2. Patient will have a decrease in pain to facilitate improvement in movement, function, and ADLs as indicated by Functional Deficits. [x]? Progressing: []? Met: []? Not Met: []? Adjusted     Long Term Goals: To be achieved in: 6 weeks  1. Disability index score of 50% or less for the JONO to assist with reaching prior level of function. [x]? Progressing: []? Met: []? Not Met: []? Adjusted  2. Patient will demonstrate increased AROM to WNL, good LS mobility, good hip ROM to allow for proper joint functioning as indicated by patients Functional Deficits. [x]? Progressing: []? Met: []? Not Met: []? Adjusted  3. Patient will demonstrate an increase in Strength to good proximal hip and core activation to allow for proper functional mobility as indicated by patients Functional Deficits. [x]? Progressing: []? Met: []? Not Met: []? Adjusted  4. Patient will return to functional activities including stair ambulation, walking community distances, sleeping without increased symptoms or restriction. [x]? Progressing: []? Met: []? Not Met: []? Adjusted  5. Patient will report 60% improvement in pain and function  [x]? Progressing: []? Met: []? Not Met: []? Adjusted            Overall Progression Towards Functional goals/ Treatment Progress Update:  [] Patient is progressing as expected towards functional goals listed. [] Progression is slowed due to complexities/Impairments listed. [] Progression has been slowed due to co-morbidities.   [x] Plan just implemented, too soon to assess goals progression <30days   [] Goals require adjustment due to lack of progress  [] Patient is not progressing as expected and requires additional follow up with physician  [] Other    Persisting Functional Limitations/Impairments:  [x]Sitting [x]Standing   [x]Walking [x]Squatting/bending    [x]Stairs [x]ADL's    [x]Transfers []Reaching  [x]Housework []Job related tasks  []Driving []Sports/Recreation

## 2021-05-20 ENCOUNTER — HOSPITAL ENCOUNTER (OUTPATIENT)
Dept: PHYSICAL THERAPY | Age: 66
Setting detail: THERAPIES SERIES
Discharge: HOME | End: 2021-05-20
Payer: MEDICAID

## 2021-05-20 NOTE — FLOWSHEET NOTE
[facility]    Physical Therapy  Cancellation/No-show Note  Patient Name:  Marvin Gusman  :  1955   Date:  2021    Cancelled visits to date: 0  No-shows to date: 1    For today's appointment patient:  []  Cancelled  []  Rescheduled appointment  [x]  No-show      Reason given by patient:  []  Patient ill  []  Conflicting appointment  []  No transportation    []  Conflict with work  [x]  No reason given  []  Other:     Comments:      Phone call information:   []  Phone call made today to patient at _ time at number provided:      []  Patient answered, conversation as follows:    []  Patient did not answer, message left as follows:  [x]  Phone call not made today  []  Phone call not needed - pt contacted us to cancel and provided reason for cancellation.      Electronically signed by:  Luz Marina Walton [id number]

## 2021-05-25 ENCOUNTER — HOSPITAL ENCOUNTER (OUTPATIENT)
Dept: PHYSICAL THERAPY | Age: 66
Setting detail: THERAPIES SERIES
Discharge: HOME | End: 2021-05-25
Payer: MEDICAID

## 2021-05-25 NOTE — FLOWSHEET NOTE
[facility]    Physical Therapy  Cancellation/No-show Note  Patient Name:  Mara Lanza  :  1955   Date:  2021    Cancelled visits to date: 1  No-shows to date: 1    For today's appointment patient:  [x]  Cancelled    []  Rescheduled appointment  []  No-show      Reason given by patient:  []  Patient ill  []  Conflicting appointment  []  No transportation    []  Conflict with work  []  No reason given  [x]  Other:  Pt is in quarantine - pt cx all appts for 2 weeks   Comments:      Phone call information:   []  Phone call made today to patient at _ time at number provided:      []  Patient answered, conversation as follows:    []  Patient did not answer, message left as follows:  [x]  Phone call not made today  []  Phone call not needed - pt contacted us to cancel and provided reason for cancellation.      Electronically signed by:  Elly Montana, [address] [facility]

## 2021-05-27 ENCOUNTER — APPOINTMENT (OUTPATIENT)
Dept: PHYSICAL THERAPY | Age: 66
End: 2021-05-27
Payer: MEDICAID

## 2021-06-01 ENCOUNTER — NURSE TRIAGE (OUTPATIENT)
Dept: OTHER | Facility: CLINIC | Age: 66
End: 2021-06-01

## 2021-06-01 ENCOUNTER — APPOINTMENT (OUTPATIENT)
Dept: PHYSICAL THERAPY | Age: 66
End: 2021-06-01
Payer: MEDICAID

## 2021-06-01 NOTE — TELEPHONE ENCOUNTER
Called and reviewed CDC guidelines. All questions answered. Patient states no further questions or concerns at this time.

## 2021-06-01 NOTE — TELEPHONE ENCOUNTER
Reason for Disposition   General information question, no triage required and triager able to answer question    Answer Assessment - Initial Assessment Questions  1. REASON FOR CALL or QUESTION: \"What is your reason for calling today? \" or \"How can I best help you? \" or \"What question do you have that I can help answer? \"      Shiela Rosario is vaccinated and her live in granddaughter tested for positive for covid. Is wondering if she is allowed to go around people since she is vaccinated, or if she could pass off covid to them even though she is vaccinate. Per CDC:If you've been around someone who has COVID-19, you do not need to stay away from others or get tested unless you have symptoms.     Protocols used: INFORMATION ONLY CALL - NO TRIAGE-ADULT-

## 2021-06-03 ENCOUNTER — APPOINTMENT (OUTPATIENT)
Dept: PHYSICAL THERAPY | Age: 66
End: 2021-06-03
Payer: MEDICAID

## 2021-06-04 ENCOUNTER — APPOINTMENT (OUTPATIENT)
Dept: PHYSICAL THERAPY | Age: 66
End: 2021-06-04
Payer: MEDICAID

## 2021-06-08 ENCOUNTER — APPOINTMENT (OUTPATIENT)
Dept: PHYSICAL THERAPY | Age: 66
End: 2021-06-08
Payer: MEDICAID

## 2021-06-10 ENCOUNTER — HOSPITAL ENCOUNTER (OUTPATIENT)
Dept: PHYSICAL THERAPY | Age: 66
Setting detail: THERAPIES SERIES
Discharge: HOME | End: 2021-06-10
Payer: MEDICAID

## 2021-06-10 NOTE — FLOWSHEET NOTE
[facility]    Physical Therapy  Cancellation/No-show Note  Patient Name:  Red Fair  :  1955   Date:  6/10/2021    Cancelled visits to date: 1  No-shows to date: 2    For today's appointment patient:  []  Cancelled    []  Rescheduled appointment  [x]  No-show , 6/10     Reason given by patient:  []  Patient ill  []  Conflicting appointment  []  No transportation    []  Conflict with work  []  No reason given  [x]  Other:  Pt is in quarantine - pt cx all appts for 2 weeks   Comments:      Phone call information:   [x]  Phone call made today to patient at _ time at number provided:      [x]  Patient answered, conversation as follows: was at the MD and just forgot about this appt. PT made her aware of next week's appt day/time. PT stressed compliance. []  Patient did not answer, message left as follows:  []  Phone call not made today  []  Phone call not needed - pt contacted us to cancel and provided reason for cancellation.      Electronically signed by:  Jordan Martel, [address] [facility]

## 2021-06-15 ENCOUNTER — HOSPITAL ENCOUNTER (OUTPATIENT)
Dept: PHYSICAL THERAPY | Age: 66
Setting detail: THERAPIES SERIES
Discharge: HOME | End: 2021-06-15
Payer: MEDICAID

## 2021-06-15 PROCEDURE — 97110 THERAPEUTIC EXERCISES: CPT

## 2021-06-15 PROCEDURE — 97530 THERAPEUTIC ACTIVITIES: CPT

## 2021-06-15 NOTE — FLOWSHEET NOTE
HSS seated       Foam:  NBOC EO,EC  HR  GS  Squat  Hip abd     30\", 15\"  10  10  10  10 L/R           Hip ext with glider   10 R/L           Mat Ex:  PPT  LTR  SKTC  Add set with GS in HKlying  SLR w TA set  TB hip abd in HKLying  SL hip abd     5x  10  2 x 10\"  10  5x L/R  Add  10 R/L HEP          Therapeutic Activities (69402)       CC: walking 7.5#  X 5 ea    FSU 6\"  10 R/L                         Neuromuscular Re-ed (80610)                                          Manual Intervention (21590)       STM w roller to gluts and LS   5' Pt in R [employer] = new ex         Pt. Education:  -pt educated on diagnosis, prognosis and expectations for rehab  -all pt questions were answered    Home Exercise Program:  Access Code: [email or web address]  URL: [email or web address]  Date: 05/13/2021  Prepared by: Clearance Standing    Exercises  Supine Hip Adduction Isometric with Ac Dredge - 1 x daily - 7 x weekly - 1 sets - 10 reps - 5 hold  Hooklying Single Knee to Chest Stretch - 1 x daily - 7 x weekly - 1 sets - 3 reps - 10 hold  Supine Lower trunk rotation with PLB - 1 x daily - 7 x weekly - 1 sets - 10 reps  Supine Posterior Pelvic Tilt - 1 x daily - 7 x weekly - 1 sets - 10 reps - 5 hold      Therapeutic Exercise and NMR EXR  [x] (26531) Provided verbal/tactile cueing for activities related to strengthening, flexibility, endurance, ROM  for improvements in proximal hip and core control with self care, mobility, lifting and ambulation.  [] (20293) Provided verbal/tactile cueing for activities related to improving balance, coordination, kinesthetic sense, posture, motor skill, proprioception  to assist with core control in self care, mobility, lifting, and ambulation.      Therapeutic Activities:    [] (10688 or 35070) Provided verbal/tactile cueing for activities related to improving balance, coordination, kinesthetic sense, posture, motor skill, proprioception and motor activation Independent in HEP and progression per patient tolerance, in order to prevent re-injury. [x]? Progressing: []? Met: []? Not Met: []? Adjusted  2. Patient will have a decrease in pain to facilitate improvement in movement, function, and ADLs as indicated by Functional Deficits. [x]? Progressing: []? Met: []? Not Met: []? Adjusted     Long Term Goals: To be achieved in: 6 weeks  1. Disability index score of 50% or less for the JONO to assist with reaching prior level of function. [x]? Progressing: []? Met: []? Not Met: []? Adjusted  2. Patient will demonstrate increased AROM to WNL, good LS mobility, good hip ROM to allow for proper joint functioning as indicated by patients Functional Deficits. [x]? Progressing: []? Met: []? Not Met: []? Adjusted  3. Patient will demonstrate an increase in Strength to good proximal hip and core activation to allow for proper functional mobility as indicated by patients Functional Deficits. [x]? Progressing: []? Met: []? Not Met: []? Adjusted  4. Patient will return to functional activities including stair ambulation, walking community distances, sleeping without increased symptoms or restriction. [x]? Progressing: []? Met: []? Not Met: []? Adjusted  5. Patient will report 60% improvement in pain and function  [x]? Progressing: []? Met: []? Not Met: []? Adjusted            Overall Progression Towards Functional goals/ Treatment Progress Update:  [] Patient is progressing as expected towards functional goals listed. [] Progression is slowed due to complexities/Impairments listed. [] Progression has been slowed due to co-morbidities.   [x] Plan just implemented, too soon to assess goals progression <30days   [] Goals require adjustment due to lack of progress  [] Patient is not progressing as expected and requires additional follow up with physician  [] Other    Persisting Functional Limitations/Impairments:  [x]Sitting [x]Standing   [x]Walking [x]Squatting/bending [x]Stairs [x]ADL's    [x]Transfers []Reaching  [x]Housework []Job related tasks  []Driving []Sports/Recreation   [x]Sleeping []Other:    ASSESSMENT: Pt had fair exercise tolerance, no increase in pain. Pt had very slow mary and small step length with CC walkouts but did well with balance and adjusted form after cueing. Asked pt about her blood sugar device since it was beeping, and she got confused but then did eat a snack while sitting with MH today d/t drop in blood sugar; felt fine at end of session. Treatment/Activity Tolerance:  [x] Patient able to complete tx  [] Patient limited by fatique  [] Patient limited by pain  [] Patient limited by other medical complications  [] Other:     Prognosis: [x] Good [] Fair  [] Poor    Patient Requires Follow-up: [x] Yes  [] No    Plan for next treatment session:    PLAN: See zach. PT2x / week for 4-6 weeks. [x] Continue per plan of care [] Alter current plan (see comments)  [] Plan of care initiated [] Hold pending MD visit [] Discharge    Electronically signed by: Charu Cohen, PTA [id number]      Note: If patient does not return for scheduled/ recommended follow up visits, this note will serve as a discharge from care along with most recent update on progress.

## 2021-06-16 ENCOUNTER — VIRTUAL VISIT (OUTPATIENT)
Dept: PSYCHOLOGY | Age: 66
End: 2021-06-16
Payer: MEDICAID

## 2021-06-16 DIAGNOSIS — F43.10 PTSD (POST-TRAUMATIC STRESS DISORDER): Primary | ICD-10-CM

## 2021-06-16 PROCEDURE — 90832 PSYTX W PT 30 MINUTES: CPT | Performed by: PSYCHOLOGIST

## 2021-06-16 NOTE — PROGRESS NOTES
TELEHEALTH VISIT -- Audio/Visual (During GBRHM-49 public health emergency)  }  Pursuant to the emergency declaration under the [address] [place] [address] [facility] authority and the [employer] Act, this Virtual Visit was conducted, with patient's consent, to reduce the patient's risk of exposure to COVID-19 and provide continuity of care for an established patient. Services were provided through a video synchronous discussion virtually to substitute for in-person clinic visit. Pt gave verbal informed consent to participate in telehealth services. Conducted a risk-benefit analysis and determined that the patient's presenting problems are consistent with the use of telepsychology. Determined that the patient has sufficient knowledge and skills in the use of technology enabling them to adequately benefit from telepsychology. It was determined that this patient was able to be properly treated without an in-person session. Patient verified that they were currently located at the [facility] address that was provided during registration or another [facility] address, if noted below. Verified the following information:  Patient's identification: Yes  Patient location: [address] [place] [address]   Patient's call back number: [phone]   Patient's emergency contact's name and number, as well as permission to contact them if needed: Extended Emergency Contact Information  Primary Emergency Contact: Haritha Yeboah[address] Phone: [phone]  Relation: Child     Provider location: [facility]  Avery Ramos, Ph.D.  Psychologist  6/16/2021  11:43 AM      Time spent with Patient: 25 minutes  [facility]. Reason for Consult:    Chief Complaint   Patient presents with    Anxiety       Feedback given to PCP. S:  Pt seen for f/u of PTSD.  Pt reported worsened mood and sxs. ID'ed it may have been too long btwn appts. Stated she learned that  is doing well in his current nursing home, but this is bringing up upsetting memories. Has decided to not attempt communication bc she wants him to be stable; tearful when describing this sacrifice. Discussed various losses of her situation. Described situation w 2 of his granddaughters adopted into the foster care program and her relationship w them. This relationship is special to her, but hurts bc she knows he does not have this communication. O:  MSE:  Appearance    alert, cooperative  Appetite normal  Sleep disturbance No  Fatigue No  Loss of pleasure No  Impulsive behavior No  Speech    spontaneous, normal rate, normal volume and well articulated  Mood    euthymic  Affect    normal affect  Thought Content    intact  Thought Process    linear, goal directed and coherent  Associations    logical connections  Insight    Fair  Judgment    Intact  Orientation    oriented to person, place, time, and general circumstances  Memory    recent and remote memory intact  Attention/Concentration    intact  Morbid ideation No  Suicide Assessment    no suicidal ideation    History:  Social History:   Social History     Socioeconomic History    Marital status: Legally      Spouse name: Not on file    Number of children: Not on file    Years of education: Not on file    Highest education level: Not on file   Occupational History    Not on file   Tobacco Use    Smoking status: Never Smoker    Smokeless tobacco: Never Used   Vaping Use    Vaping Use: Never used   Substance and Sexual Activity    Alcohol use: Not Currently    Drug use: Never    Sexual activity: Not Currently   Other Topics Concern    Not on file   Social History Narrative    Moved from [place] to [facility] [x+1] [facility]. Has a daughter, son-in-law and grandchildren live with her.       Social Determinants of Health     Financial Resource Strain:  Difficulty of Paying Living Expenses:    Food Insecurity:     Worried About Running Out of Food in the Last Year:     Ran Out of Food in the Last Year:    Transportation Needs:     Lack of Transportation (Medical):  Lack of Transportation (Non-Medical):    Physical Activity:     Days of Exercise per Week:     Minutes of Exercise per Session:    Stress:     Feeling of Stress :    Social Connections:     Frequency of Communication with Friends and Family:     Frequency of Social Gatherings with Friends and Family:     Attends Church Services:     Active Member of Clubs or Organizations:     Attends Club or Organization Meetings:     Marital Status:    Intimate Partner Violence:     Fear of Current or Ex-Partner:     Emotionally Abused:     Physically Abused:     Sexually Abused:      TOBACCO:   reports that she has never smoked. She has never used smokeless tobacco.  ETOH:   reports previous alcohol use. Diagnosis:  1. PTSD (post-traumatic stress disorder)          Plan:  Pt interventions:  Discussed self-care (sleep, nutrition, rewarding activities, social support, exercise), Supportive techniques and Identified maladaptive thoughts        Documentation was done using voice recognition dragon software. Every effort was made to ensure accuracy; however, inadvertent, unintentional computerized transcription errors may be present.

## 2021-06-17 ENCOUNTER — HOSPITAL ENCOUNTER (OUTPATIENT)
Dept: PHYSICAL THERAPY | Age: 66
Setting detail: THERAPIES SERIES
Discharge: HOME | End: 2021-06-17
Payer: MEDICAID

## 2021-06-17 PROCEDURE — 97530 THERAPEUTIC ACTIVITIES: CPT | Performed by: PHYSICAL THERAPIST

## 2021-06-17 PROCEDURE — 97110 THERAPEUTIC EXERCISES: CPT | Performed by: PHYSICAL THERAPIST

## 2021-06-17 NOTE — FLOWSHEET NOTE
[facility]  Phone: [phone]  Fax: [phone]    Physical Therapy Treatment Note/ Progress Report:     Date:  2021    Patient Name:  Ru Urias    :  1955  MRN: [id number]  Restrictions/Precautions:    Medical/Treatment Diagnosis Information:  Diagnosis: M54.5, W18. 2XXA     Acute LBP after Fall in shower  Treatment Diagnosis: Acute LBP with mm spasm  Insurance/Certification information:  PT Insurance Information: [address]  Physician Information:  Referring Practitioner: Dm Fernandez [address] of care signed (Y/N): []  Yes  [x]  No     Date of Patient follow up with Physician:      Progress Report: []  Yes  [x]  No     Date Range for reporting period:  Beginnin21  Ending:     Progress report due (10 Rx/or 30 days whichever is less):      Recertification due (POC duration/ or 90 days whichever is less):     Visit # Insurance Allowable Auth required? Date Range   4 30  See units below []  Yes  []  No  - 21     Latex Allergy:  [x]NO      []YES  Preferred Language for Healthcare:   [x]English       []other:    Functional Scale:       Date assessed:  Oswestry: raw score =37; dysfunction = 74%  21    Pain level:  510     History: Patient reports a fall in the shower on 5/3/21. She had immediate onset of LBP. Pt denies radicular s/s. Pain has continued and is gradually getting better, but ADLs remain impacted. Patient was referred to PT by her PCP.      SUBJECTIVE:  Pt reports that her back is \"tolerable\"    OBJECTIVE: See eval   Observation:    Test measurements:      RESTRICTIONS/PRECAUTIONS:     Exercises/Interventions:     Therapeutic Exercise (86274) Resistance / level Sets / Seconds Reps Notes / Cues   Bike : Seat 2   4'    IB   2x30\"    HSS seated  Glut str seated   add    Foam:  NBOC EO,EC  HR  GS  Squat  Hip abd     30\", 15\"  10  10  10             Hip ext with glider [x]Transfers []Reaching  [x]Housework []Job related tasks  []Driving []Sports/Recreation   [x]Sleeping []Other:    ASSESSMENT: Pt had better exercise tolerance this date. Pt had very slow mary and small step length with CC walkouts but did well with balance and adjusted form after cueing. She reports that her blood sugar was low last session. Progress as tolerates. Treatment/Activity Tolerance:  [x] Patient able to complete tx  [] Patient limited by fatique  [] Patient limited by pain  [] Patient limited by other medical complications  [] Other:     Prognosis: [x] Good [] Fair  [] Poor    Patient Requires Follow-up: [x] Yes  [] No    Plan for next treatment session:    PLAN: See eval. PT2x / week for 4-6 weeks. [x] Continue per plan of care [] Alter current plan (see comments)  [] Plan of care initiated [] Hold pending MD visit [] Discharge    Electronically signed by: Peter Doran, PT [id number]      Note: If patient does not return for scheduled/ recommended follow up visits, this note will serve as a discharge from care along with most recent update on progress.

## 2021-06-18 LAB
AVERAGE GLUCOSE: NORMAL
CHOLESTEROL, TOTAL: NORMAL
CHOLESTEROL/HDL RATIO: NORMAL
HBA1C MFR BLD: 6.5 %
HDLC SERPL-MCNC: NORMAL MG/DL
LDL CHOLESTEROL CALCULATED: NORMAL
NONHDLC SERPL-MCNC: NORMAL MG/DL
TRIGL SERPL-MCNC: NORMAL MG/DL
VLDLC SERPL CALC-MCNC: NORMAL MG/DL

## 2021-06-22 ENCOUNTER — HOSPITAL ENCOUNTER (OUTPATIENT)
Dept: PHYSICAL THERAPY | Age: 66
Setting detail: THERAPIES SERIES
Discharge: HOME | End: 2021-06-22
Payer: MEDICAID

## 2021-06-22 PROCEDURE — 97110 THERAPEUTIC EXERCISES: CPT

## 2021-06-22 PROCEDURE — 97140 MANUAL THERAPY 1/> REGIONS: CPT

## 2021-06-22 PROCEDURE — 97530 THERAPEUTIC ACTIVITIES: CPT

## 2021-06-22 NOTE — FLOWSHEET NOTE
[facility]  Phone: [phone]  Fax: [phone]    Physical Therapy Treatment Note/ Progress Report:     Date:  2021    Patient Name:  Nakul Morrow    :  1955  MRN: [id number]  Restrictions/Precautions:    Medical/Treatment Diagnosis Information:  Diagnosis: M54.5, W18. 2XXA     Acute LBP after Fall in shower  Treatment Diagnosis: Acute LBP with mm spasm  Insurance/Certification information:  PT Insurance Information: [address]  Physician Information:  Referring Practitioner: Dm Bautista [address] of care signed (Y/N): []  Yes  [x]  No     Date of Patient follow up with Physician:      Progress Report: []  Yes  [x]  No     Date Range for reporting period:  Beginnin21  Ending:     Progress report due (10 Rx/or 30 days whichever is less):      Recertification due (POC duration/ or 90 days whichever is less):     Visit # Insurance Allowable Auth required? Date Range   5 30  See units below []  Yes  []  No  - 21     Latex Allergy:  [x]NO      []YES  Preferred Language for Healthcare:   [x]English       []other:    Functional Scale:       Date assessed:  Oswestry: raw score =37; dysfunction = 74%  21    Pain level:  3/10     History: Patient reports a fall in the shower on 5/3/21. She had immediate onset of LBP. Pt denies radicular s/s. Pain has continued and is gradually getting better, but ADLs remain impacted. Patient was referred to PT by her PCP. SUBJECTIVE:  Pt reports that when she is sitting or laying it takes her a few minutes to get going. Pain is better today, did take advil prior to coming in today.     OBJECTIVE: See eval   Observation:    Test measurements:      RESTRICTIONS/PRECAUTIONS:     Exercises/Interventions:     Therapeutic Exercise (20220) Resistance / level Sets / Seconds Reps Notes / Cues   Bike : Seat 2   4'    IB   2x30\"    HSS seated  Glut str seated   add Foam:  NBOC EO,EC  HR  GS  Squat  Hip abd                Hip ext with glider              Mat Ex:  PPT  LTR  SKTC  Bridge w Add set   SLR w TA set  Piriformis Str  TB hip abd in HKLying w TA  SL hip abd  clam               green      2 x 10\"  10  10x L/R  3x10\" B  15  10 R/L HEP          Therapeutic Activities (41424)       CC: walking  CC: hip ext, abd 5#  2.5#  X 5 ea  x10 ea L/R  Fwd, retro   FSU 6\"  10 R/L                         Neuromuscular Re-ed (47897)                                          Manual Intervention (26148)       STM w roller to R glut and LS   5' Pt in R Slying   Manual STM to R LS and glut   5' Pt in L side-lying                                             Bold = new ex         Pt. Education:  -pt educated on diagnosis, prognosis and expectations for rehab  -all pt questions were answered    Home Exercise Program:  6/17:  Piriformis str    Access Code: [email or web address]  URL: [email or web address]  Date: 05/13/2021  Prepared by: Sylvia Fungw    Exercises  Supine Hip Adduction Isometric with Aicha [place] - 1 x daily - 7 x weekly - 1 sets - 10 reps - 5 hold  Hooklying Single Knee to Chest Stretch - 1 x daily - 7 x weekly - 1 sets - 3 reps - 10 hold  Supine Lower trunk rotation with PLB - 1 x daily - 7 x weekly - 1 sets - 10 reps  Supine Posterior Pelvic Tilt - 1 x daily - 7 x weekly - 1 sets - 10 reps - 5 hold      Therapeutic Exercise and NMR EXR  [x] (04147) Provided verbal/tactile cueing for activities related to strengthening, flexibility, endurance, ROM  for improvements in proximal hip and core control with self care, mobility, lifting and ambulation.  [] (17136) Provided verbal/tactile cueing for activities related to improving balance, coordination, kinesthetic sense, posture, motor skill, proprioception  to assist with core control in self care, mobility, lifting, and ambulation.      Therapeutic Activities:    [] (61567 or ) Provided verbal/tactile cueing for activities related to improving balance, coordination, kinesthetic sense, posture, motor skill, proprioception and motor activation to allow for proper function  with self care and ADLs  [] (85224) Provided training and instruction to the patient for proper core and proximal hip recruitment and positioning with ambulation re-education     Home Exercise Program:    [x] (08493) Reviewed/Progressed HEP activities related to strengthening, flexibility, endurance, ROM of core, proximal hip and LE for functional self-care, mobility, lifting and ambulation   [] (36741) Reviewed/Progressed HEP activities related to improving balance, coordination, kinesthetic sense, posture, motor skill, proprioception of core, proximal hip and LE for self care, mobility, lifting, and ambulation      Manual Treatments:  PROM / STM / Oscillations-Mobs:  G-I, II, III, IV (PA's, Inf., Post.)  [x] (72494) Provided manual therapy to mobilize proximal hip and LS spine soft tissue/joints for the purpose of modulating pain, promoting relaxation,  increasing ROM, reducing/eliminating soft tissue swelling/inflammation/restriction, improving soft tissue extensibility and allowing for proper ROM for normal function with self care, mobility, lifting and ambulation. Modalities:  6/22: MH x 10 min to R hip and LB - pt L side-lying    Charges:  Timed Code Treatment Minutes: 45   Total Treatment Minutes: 55       [] EVAL - LOW (45100)   [] EVAL - MOD (76303)  [] EVAL - HIGH (33679)  [] RE-EVAL (92469)  [x] TE (50734) x1      [] Ionto (65339)  [] NMR (37747) x      [] Vaso (94916)  [x] Manual (11454) x1      [] Ultrasound  [x] TA (21709) x 1     [] Mech Traction (16358)  [] Gait Training x     [] ES (un) (51726):   [] Aquatic therapy x   [] Other:   [] Group:     Approval Dates: 5/14 -8/12/21  Date updated: 5/17 Units used Units authorized   TE (58795) 7 50   Man (51843) 2 48   TA (71028) 3 50   NM (69891) 0 48               Goals:   Patient stated goal: reduce LBP  [x]? Progressing: []? Met: []? Not Met: []? Adjusted     Therapist goals for Patient:   Short Term Goals: To be achieved in: 2 weeks  1. Independent in HEP and progression per patient tolerance, in order to prevent re-injury. [x]? Progressing: []? Met: []? Not Met: []? Adjusted  2. Patient will have a decrease in pain to facilitate improvement in movement, function, and ADLs as indicated by Functional Deficits. [x]? Progressing: []? Met: []? Not Met: []? Adjusted     Long Term Goals: To be achieved in: 6 weeks  1. Disability index score of 50% or less for the JONO to assist with reaching prior level of function. [x]? Progressing: []? Met: []? Not Met: []? Adjusted  2. Patient will demonstrate increased AROM to WNL, good LS mobility, good hip ROM to allow for proper joint functioning as indicated by patients Functional Deficits. [x]? Progressing: []? Met: []? Not Met: []? Adjusted  3. Patient will demonstrate an increase in Strength to good proximal hip and core activation to allow for proper functional mobility as indicated by patients Functional Deficits. [x]? Progressing: []? Met: []? Not Met: []? Adjusted  4. Patient will return to functional activities including stair ambulation, walking community distances, sleeping without increased symptoms or restriction. [x]? Progressing: []? Met: []? Not Met: []? Adjusted  5. Patient will report 60% improvement in pain and function  [x]? Progressing: []? Met: []? Not Met: []? Adjusted            Overall Progression Towards Functional goals/ Treatment Progress Update:  [] Patient is progressing as expected towards functional goals listed. [] Progression is slowed due to complexities/Impairments listed. [] Progression has been slowed due to co-morbidities.   [x] Plan just implemented, too soon to assess goals progression <30days   [] Goals require adjustment due to lack of progress  [] Patient is not progressing as expected and requires additional follow up with physician  [] Other    Persisting Functional Limitations/Impairments:  [x]Sitting [x]Standing   [x]Walking [x]Squatting/bending    [x]Stairs [x]ADL's    [x]Transfers []Reaching  [x]Housework []Job related tasks  []Driving []Sports/Recreation   [x]Sleeping []Other:    ASSESSMENT: Pt had better exercise tolerance, made sure to eat good before she came in today. Pt noted increased lumbar pain with CC side steps and hip abduction. Pt had good form with bridges but needed cueing to not hold her breath. Felt better after manual and roller STM; MH after. Treatment/Activity Tolerance:  [x] Patient able to complete tx  [] Patient limited by fatique  [] Patient limited by pain  [] Patient limited by other medical complications  [] Other:     Prognosis: [x] Good [] Fair  [] Poor    Patient Requires Follow-up: [x] Yes  [] No    Plan for next treatment session:    PLAN: See eval. PT2x / week for 4-6 weeks. [x] Continue per plan of care [] Alter current plan (see comments)  [] Plan of care initiated [] Hold pending MD visit [] Discharge    Electronically signed by: Izaiah Samuel, PTA [id number]      Note: If patient does not return for scheduled/ recommended follow up visits, this note will serve as a discharge from care along with most recent update on progress.

## 2021-06-24 ENCOUNTER — HOSPITAL ENCOUNTER (OUTPATIENT)
Dept: PHYSICAL THERAPY | Age: 66
Setting detail: THERAPIES SERIES
Discharge: HOME | End: 2021-06-24
Payer: MEDICAID

## 2021-06-24 PROCEDURE — 97110 THERAPEUTIC EXERCISES: CPT | Performed by: PHYSICAL THERAPIST

## 2021-06-24 PROCEDURE — 97530 THERAPEUTIC ACTIVITIES: CPT | Performed by: PHYSICAL THERAPIST

## 2021-06-24 PROCEDURE — 97140 MANUAL THERAPY 1/> REGIONS: CPT | Performed by: PHYSICAL THERAPIST

## 2021-06-24 NOTE — FLOWSHEET NOTE
EO,EC  HR  GS  Squat  Hip abd  OH lift with med ball             yellow    30\", 15\"  10  10  10  10 L/R  5x           Hip ext with glider              Mat Ex:  PPT  LTR  SKTC  Bridge w Add set   SLR w TA set  Piriformis Str  TB hip abd in HKLying w TA  SL hip abd  clam               green      2 x 10\"  10  10x L/R  3x10\" B  15  10 R/L HEP          Therapeutic Activities (53588)       CC: walking  CC: hip ext, abd 5#  2.5#  X 5 ea Fwd, retro  Resume npv     FSU 6\"  10 R/L                         Neuromuscular Re-ed (54886)                                          Manual Intervention (64456)              Manual STM to B LS and glut   5' Pt in prone w pillow                                             Bold = new ex         Pt. Education:  -pt educated on diagnosis, prognosis and expectations for rehab  -all pt questions were answered    Home Exercise Program:  6/17:  Piriformis str    Access Code: [email or web address]  URL: [email or web address]  Date: 05/13/2021  Prepared by: Leticia Going    Exercises  Supine Hip Adduction Isometric with Steva Crosser - 1 x daily - 7 x weekly - 1 sets - 10 reps - 5 hold  Hooklying Single Knee to Chest Stretch - 1 x daily - 7 x weekly - 1 sets - 3 reps - 10 hold  Supine Lower trunk rotation with PLB - 1 x daily - 7 x weekly - 1 sets - 10 reps  Supine Posterior Pelvic Tilt - 1 x daily - 7 x weekly - 1 sets - 10 reps - 5 hold      Therapeutic Exercise and NMR EXR  [x] (28106) Provided verbal/tactile cueing for activities related to strengthening, flexibility, endurance, ROM  for improvements in proximal hip and core control with self care, mobility, lifting and ambulation.  [] (77466) Provided verbal/tactile cueing for activities related to improving balance, coordination, kinesthetic sense, posture, motor skill, proprioception  to assist with core control in self care, mobility, lifting, and ambulation.      Therapeutic Activities:    [x] (77553 or 37106) Provided verbal/tactile cueing for activities related to improving balance, coordination, kinesthetic sense, posture, motor skill, proprioception and motor activation to allow for proper function  with self care and ADLs  [] (15904) Provided training and instruction to the patient for proper core and proximal hip recruitment and positioning with ambulation re-education     Home Exercise Program:    [x] (25745) Reviewed/Progressed HEP activities related to strengthening, flexibility, endurance, ROM of core, proximal hip and LE for functional self-care, mobility, lifting and ambulation   [] (14945) Reviewed/Progressed HEP activities related to improving balance, coordination, kinesthetic sense, posture, motor skill, proprioception of core, proximal hip and LE for self care, mobility, lifting, and ambulation      Manual Treatments:  PROM / STM / Oscillations-Mobs:  G-I, II, III, IV (PA's, Inf., Post.)  [x] (21428) Provided manual therapy to mobilize proximal hip and LS spine soft tissue/joints for the purpose of modulating pain, promoting relaxation,  increasing ROM, reducing/eliminating soft tissue swelling/inflammation/restriction, improving soft tissue extensibility and allowing for proper ROM for normal function with self care, mobility, lifting and ambulation. Modalities:  6/24: NA this date                    Charges:  Timed Code Treatment Minutes: 45   Total Treatment Minutes: 55       [] EVAL - LOW (56994)   [] EVAL - MOD (18101)  [] EVAL - HIGH (45374)  [] RE-EVAL (86099)  [x] TE (51951) x1      [] Ionto (77032)  [] NMR (62374) x      [] Vaso (63169)  [x] Manual (39387) x1      [] Ultrasound  [x] TA (55353) x 1     [] [facility] (01371)  [] Gait Training x     [] ES (un) (73500):   [] Aquatic therapy x   [] Other:   [] Group:     Approval Dates: 5/14 -8/12/21  Date updated: 5/17 Units used Units authorized   TE (64299) 8 50   Man (37983) 3 48   TA (50467) 9 23   UO (27650) 0 48               Goals:   Patient stated goal: reduce LBP  [x]? Progressing: []? Met: []? Not Met: []? Adjusted     Therapist goals for Patient:   Short Term Goals: To be achieved in: 2 weeks  1. Independent in HEP and progression per patient tolerance, in order to prevent re-injury. [x]? Progressing: []? Met: []? Not Met: []? Adjusted  2. Patient will have a decrease in pain to facilitate improvement in movement, function, and ADLs as indicated by Functional Deficits. [x]? Progressing: []? Met: []? Not Met: []? Adjusted     Long Term Goals: To be achieved in: 6 weeks  1. Disability index score of 50% or less for the JONO to assist with reaching prior level of function. [x]? Progressing: []? Met: []? Not Met: []? Adjusted  2. Patient will demonstrate increased AROM to WNL, good LS mobility, good hip ROM to allow for proper joint functioning as indicated by patients Functional Deficits. [x]? Progressing: []? Met: []? Not Met: []? Adjusted  3. Patient will demonstrate an increase in Strength to good proximal hip and core activation to allow for proper functional mobility as indicated by patients Functional Deficits. [x]? Progressing: []? Met: []? Not Met: []? Adjusted  4. Patient will return to functional activities including stair ambulation, walking community distances, sleeping without increased symptoms or restriction. [x]? Progressing: []? Met: []? Not Met: []? Adjusted  5. Patient will report 60% improvement in pain and function  [x]? Progressing: []? Met: []? Not Met: []? Adjusted            Overall Progression Towards Functional goals/ Treatment Progress Update:  [] Patient is progressing as expected towards functional goals listed. [] Progression is slowed due to complexities/Impairments listed. [] Progression has been slowed due to co-morbidities.   [x] Plan just implemented, too soon to assess goals progression <30days   [] Goals require adjustment due to lack of progress  [] Patient is not progressing as expected and requires additional follow up with

## 2021-06-29 ENCOUNTER — HOSPITAL ENCOUNTER (OUTPATIENT)
Dept: PHYSICAL THERAPY | Age: 66
Setting detail: THERAPIES SERIES
Discharge: HOME | End: 2021-06-29
Payer: MEDICAID

## 2021-06-29 PROCEDURE — 97140 MANUAL THERAPY 1/> REGIONS: CPT | Performed by: PHYSICAL THERAPIST

## 2021-06-29 PROCEDURE — 97110 THERAPEUTIC EXERCISES: CPT | Performed by: PHYSICAL THERAPIST

## 2021-06-29 PROCEDURE — 97530 THERAPEUTIC ACTIVITIES: CPT | Performed by: PHYSICAL THERAPIST

## 2021-06-29 NOTE — FLOWSHEET NOTE
[facility]  Phone: [phone]  Fax: [phone]    Physical Therapy Treatment Note/ Progress Report:     Date:  2021    Patient Name:  Maricarmen Lynch    :  1955  MRN: [id number]  Restrictions/Precautions:    Medical/Treatment Diagnosis Information:  Diagnosis: M54.5, W18. 2XXA     Acute LBP after Fall in shower  Treatment Diagnosis: Acute LBP with mm spasm  Insurance/Certification information:  PT Insurance Information: [address]  Physician Information:  Referring Practitioner: Dm Buckley [address] of care signed (Y/N): []  Yes  [x]  No     Date of Patient follow up with Physician:      Progress Report: []  Yes  [x]  No     Date Range for reporting period:  Beginnin21  Ending:     Progress report due (10 Rx/or 30 days whichever is less):      Recertification due (POC duration/ or 90 days whichever is less):     Visit # Insurance Allowable Auth required? Date Range   6 30  See units below []  Yes  []  No  - 21     Latex Allergy:  [x]NO      []YES  Preferred Language for Healthcare:   [x]English       []other:    Functional Scale:       Date assessed:  Oswestry: raw score =37; dysfunction = 74%  21    Pain level:  210     History: Patient reports a fall in the shower on 5/3/21. She had immediate onset of LBP. Pt denies radicular s/s. Pain has continued and is gradually getting better, but ADLs remain impacted. Patient was referred to PT by her PCP. SUBJECTIVE: Pain is better today. Overall she feels that PT is helping and that she is improving. R glut tightness cont and is ttp. OBJECTIVE: See eval   Observation:    Test measurements:      RESTRICTIONS/PRECAUTIONS:     Exercises/Interventions:     Therapeutic Exercise (76482) Resistance / level Sets / Seconds Reps Notes / Cues   Bike : Seat 2 1. 4mph  5'    IB   2x30\"    HSS seated  Glut str seated   Add  2x20\" R   Unable to do on L 48   TA (94505 5 48   NM (04045) 0 48               Goals:   Patient stated goal: reduce LBP  [x]? Progressing: []? Met: []? Not Met: []? Adjusted     Therapist goals for Patient:   Short Term Goals: To be achieved in: 2 weeks  1. Independent in HEP and progression per patient tolerance, in order to prevent re-injury. [x]? Progressing: []? Met: []? Not Met: []? Adjusted  2. Patient will have a decrease in pain to facilitate improvement in movement, function, and ADLs as indicated by Functional Deficits. [x]? Progressing: []? Met: []? Not Met: []? Adjusted     Long Term Goals: To be achieved in: 6 weeks  1. Disability index score of 50% or less for the JONO to assist with reaching prior level of function. [x]? Progressing: []? Met: []? Not Met: []? Adjusted  2. Patient will demonstrate increased AROM to WNL, good LS mobility, good hip ROM to allow for proper joint functioning as indicated by patients Functional Deficits. [x]? Progressing: []? Met: []? Not Met: []? Adjusted  3. Patient will demonstrate an increase in Strength to good proximal hip and core activation to allow for proper functional mobility as indicated by patients Functional Deficits. [x]? Progressing: []? Met: []? Not Met: []? Adjusted  4. Patient will return to functional activities including stair ambulation, walking community distances, sleeping without increased symptoms or restriction. [x]? Progressing: []? Met: []? Not Met: []? Adjusted  5. Patient will report 60% improvement in pain and function  [x]? Progressing: []? Met: []? Not Met: []? Adjusted            Overall Progression Towards Functional goals/ Treatment Progress Update:  [] Patient is progressing as expected towards functional goals listed. [] Progression is slowed due to complexities/Impairments listed. [] Progression has been slowed due to co-morbidities.   [x] Plan just implemented, too soon to assess goals progression <30days   [] Goals require adjustment due to lack

## 2021-07-01 ENCOUNTER — HOSPITAL ENCOUNTER (OUTPATIENT)
Dept: PHYSICAL THERAPY | Age: 66
Setting detail: THERAPIES SERIES
Discharge: HOME | End: 2021-07-01
Payer: MEDICAID

## 2021-07-01 PROCEDURE — 97110 THERAPEUTIC EXERCISES: CPT | Performed by: PHYSICAL THERAPIST

## 2021-07-01 PROCEDURE — 97530 THERAPEUTIC ACTIVITIES: CPT | Performed by: PHYSICAL THERAPIST

## 2021-07-01 PROCEDURE — 97140 MANUAL THERAPY 1/> REGIONS: CPT | Performed by: PHYSICAL THERAPIST

## 2021-07-01 NOTE — FLOWSHEET NOTE
[facility]  Phone: [phone]  Fax: [phone]    Physical Therapy Treatment Note/ Progress Report:     Date:  2021    Patient Name:  Tomy Lopez    :  1955  MRN: [id number]  Restrictions/Precautions:    Medical/Treatment Diagnosis Information:  Diagnosis: M54.5, W18. 2XXA     Acute LBP after Fall in shower  Treatment Diagnosis: Acute LBP with mm spasm  Insurance/Certification information:  PT Insurance Information: [address]  Physician Information:  Referring Practitioner: mD [address] of care signed (Y/N): []  Yes  [x]  No     Date of Patient follow up with Physician:      Progress Report: []  Yes  [x]  No     Date Range for reporting period:  Beginnin21  Ending:     Progress report due (10 Rx/or 30 days whichever is less):      Recertification due (POC duration/ or 90 days whichever is less):     Visit # Insurance Allowable Auth required? Date Range   7 30  See units below []  Yes  []  No  - 21     Latex Allergy:  [x]NO      []YES  Preferred Language for Healthcare:   [x]English       []other:    Functional Scale:       Date assessed:  Oswestry: raw score =37; dysfunction = 74%  21    Pain level: 1- 2/10     History: Patient reports a fall in the shower on 5/3/21. She had immediate onset of LBP. Pt denies radicular s/s. Pain has continued and is gradually getting better, but ADLs remain impacted. Patient was referred to PT by her PCP. SUBJECTIVE: Pain is better today. Overall she feels that PT is helping and that she is improving. R glut tightness cont and is ttp. OBJECTIVE: See eval   Observation:    Test measurements:      RESTRICTIONS/PRECAUTIONS:     Exercises/Interventions:     Therapeutic Exercise (19331) Resistance / level Sets / Seconds Reps Notes / Cues   Bike : Seat 2 1. 4mph  5'    IB   2x30\"    HSS seated  Glut str seated   2x20\" B   Tighter on L  Unable to do on L - added to HEP   Foam:  NBOC EO,EC  HR  GS  Squat  Hip abd  OH lift with med ball             red    30\", 15\"  10  10  10  10 L/R  10x           Hip ext with glider    Resume npv          Mat Ex:  PPT  LTR  SKTC  Bridge w Add set   SLR w TA set  Piriformis Str  TB hip abd in HKLying w TA  SL hip abd  clam               green      2 x 10\"  10  10x L/R  3x10\" B  15  10 R/L  add HEP          Therapeutic Activities (93964)       CC: walking  CC: Hip abd 5#  2.5#  X 4 [id number]  L/R Fwd & retro only (pn w L s/s)  No alvino ext     FSU 6\"  10 R/L    Ab press with Red Tball seated   15                  Neuromuscular Re-ed (39617)                                          Manual Intervention (09300)              Manual STM to B LS and glut  Roller to R glut   10' Pt in prone w pillow                                             Bold = new ex         Pt. Education:  -pt educated on diagnosis, prognosis and expectations for rehab  -all pt questions were answered    Home Exercise Program:  6/17:  Piriformis str    Access Code: [email or web address]  URL: [email or web address]  Date: 05/13/2021  Prepared by: Claudene Dixon    Exercises  Supine Hip Adduction Isometric with Erica Reagin - 1 x daily - 7 x weekly - 1 sets - 10 reps - 5 hold  Hooklying Single Knee to Chest Stretch - 1 x daily - 7 x weekly - 1 sets - 3 reps - 10 hold  Supine Lower trunk rotation with PLB - 1 x daily - 7 x weekly - 1 sets - 10 reps  Supine Posterior Pelvic Tilt - 1 x daily - 7 x weekly - 1 sets - 10 reps - 5 hold      Therapeutic Exercise and NMR EXR  [x] (56806) Provided verbal/tactile cueing for activities related to strengthening, flexibility, endurance, ROM  for improvements in proximal hip and core control with self care, mobility, lifting and ambulation.  [] (51098) Provided verbal/tactile cueing for activities related to improving balance, coordination, kinesthetic sense, posture, motor skill, proprioception  to assist with core control in self care, mobility, lifting, and ambulation. Therapeutic Activities:    [x] (60136 or 78943) Provided verbal/tactile cueing for activities related to improving balance, coordination, kinesthetic sense, posture, motor skill, proprioception and motor activation to allow for proper function  with self care and ADLs  [] (79185) Provided training and instruction to the patient for proper core and proximal hip recruitment and positioning with ambulation re-education     Home Exercise Program:    [x] (65411) Reviewed/Progressed HEP activities related to strengthening, flexibility, endurance, ROM of core, proximal hip and LE for functional self-care, mobility, lifting and ambulation   [] (48264) Reviewed/Progressed HEP activities related to improving balance, coordination, kinesthetic sense, posture, motor skill, proprioception of core, proximal hip and LE for self care, mobility, lifting, and ambulation      Manual Treatments:  PROM / STM / Oscillations-Mobs:  G-I, II, III, IV (PA's, Inf., Post.)  [x] (56840) Provided manual therapy to mobilize proximal hip and LS spine soft tissue/joints for the purpose of modulating pain, promoting relaxation,  increasing ROM, reducing/eliminating soft tissue swelling/inflammation/restriction, improving soft tissue extensibility and allowing for proper ROM for normal function with self care, mobility, lifting and ambulation.      Modalities:  6/29: NA this date                    Charges:  Timed Code Treatment Minutes: 45   Total Treatment Minutes: 45       [] EVAL - LOW (98224)   [] EVAL - MOD (69677)  [] EVAL - HIGH (49189)  [] RE-EVAL (29230)  [x] TE (57006) x1      [] Ionto (35555)  [] NMR (30602) x      [] Vaso (36423)  [x] Manual (03822) x1      [] Ultrasound  [x] TA (72675) x 1     [] Mech Traction (48552)  [] Gait Training x     [] ES (un) (45250):   [] Aquatic therapy x   [] Other:   [] Group:     Approval Dates: 5/14 -8/12/21  Date updated: 5/17 Units used Units authorized   TE (52676) 10 48   Man (18680) 5 48   TA 91 [phone]) 6 50   NM (47681) 0 48               Goals:   Patient stated goal: reduce LBP  [x]? Progressing: []? Met: []? Not Met: []? Adjusted     Therapist goals for Patient:   Short Term Goals: To be achieved in: 2 weeks  1. Independent in HEP and progression per patient tolerance, in order to prevent re-injury. []? Progressing: [x]? Met: []? Not Met: []? Adjusted  2. Patient will have a decrease in pain to facilitate improvement in movement, function, and ADLs as indicated by Functional Deficits. []? Progressing: [x]? Met: []? Not Met: []? Adjusted     Long Term Goals: To be achieved in: 6 weeks  1. Disability index score of 50% or less for the JONO to assist with reaching prior level of function. [x]? Progressing: []? Met: []? Not Met: []? Adjusted  2. Patient will demonstrate increased AROM to WNL, good LS mobility, good hip ROM to allow for proper joint functioning as indicated by patients Functional Deficits. [x]? Progressing: []? Met: []? Not Met: []? Adjusted  3. Patient will demonstrate an increase in Strength to good proximal hip and core activation to allow for proper functional mobility as indicated by patients Functional Deficits. [x]? Progressing: []? Met: []? Not Met: []? Adjusted  4. Patient will return to functional activities including stair ambulation, walking community distances, sleeping without increased symptoms or restriction. [x]? Progressing: []? Met: []? Not Met: []? Adjusted  5. Patient will report 60% improvement in pain and function  [x]? Progressing: []? Met: []? Not Met: []? Adjusted            Overall Progression Towards Functional goals/ Treatment Progress Update:  [] Patient is progressing as expected towards functional goals listed. [] Progression is slowed due to complexities/Impairments listed. [] Progression has been slowed due to co-morbidities.   [x] Plan just implemented, too soon to assess goals progression <30days   [] Goals require adjustment due to lack of progress  [] Patient is not progressing as expected and requires additional follow up with physician  [] Other    Persisting Functional Limitations/Impairments:  [x]Sitting [x]Standing   [x]Walking [x]Squatting/bending    [x]Stairs [x]ADL's    [x]Transfers []Reaching  [x]Housework []Job related tasks  []Driving []Sports/Recreation   [x]Sleeping []Other:    ASSESSMENT: Pt had better exercise tolerance today. Pt did get LBP w CC hip ext - will return to [facility] npv. Pt is still weak in her core. Performed STM with pt in prone with pillow with good tolerance. Pt has Mod B glut mm guarding. Relief after session. Treatment/Activity Tolerance:  [x] Patient able to complete tx  [] Patient limited by fatique  [] Patient limited by pain  [] Patient limited by other medical complications  [] Other:     Prognosis: [x] Good [] Fair  [] Poor    Patient Requires Follow-up: [x] Yes  [] No    Plan for next treatment session:    PLAN: See eval. PT2x / week for 4-6 weeks. [x] Continue per plan of care [] Alter current plan (see comments)  [] Plan of care initiated [] Hold pending MD visit [] Discharge    Electronically signed by: Delfino Sneed, PT [id number]      Note: If patient does not return for scheduled/ recommended follow up visits, this note will serve as a discharge from care along with most recent update on progress.

## 2021-07-02 ENCOUNTER — TELEPHONE (OUTPATIENT)
Dept: INTERNAL MEDICINE CLINIC | Age: 66
End: 2021-07-02

## 2021-07-02 NOTE — TELEPHONE ENCOUNTER
Patient states she thought she was having allergy issues but she now has a cough and feels like congestion in chest.  She states she is diabetic. Patient is requesting an antibiotic and medication for her cough. She uses [address] in [place].

## 2021-07-02 NOTE — TELEPHONE ENCOUNTER
Advised would need to be seen for ATB   - should go to urgent care if severe issues or treat symptoms with OTC meds

## 2021-07-06 ENCOUNTER — HOSPITAL ENCOUNTER (OUTPATIENT)
Dept: PHYSICAL THERAPY | Age: 66
Setting detail: THERAPIES SERIES
Discharge: HOME | End: 2021-07-06
Payer: MEDICAID

## 2021-07-06 PROCEDURE — 97530 THERAPEUTIC ACTIVITIES: CPT

## 2021-07-06 PROCEDURE — 97140 MANUAL THERAPY 1/> REGIONS: CPT

## 2021-07-06 PROCEDURE — 97110 THERAPEUTIC EXERCISES: CPT

## 2021-07-06 NOTE — FLOWSHEET NOTE
[place] , [facility]  Phone: [phone]  Fax: [phone]    Physical Therapy Treatment Note/ Progress Report:     Date:  2021    Patient Name:  Tena Chapman    :  1955  MRN: [id number]  Restrictions/Precautions:    Medical/Treatment Diagnosis Information:  Diagnosis: M54.5, W18. 2XXA     Acute LBP after Fall in shower  Treatment Diagnosis: Acute LBP with mm spasm  Insurance/Certification information:  PT Insurance Information: [address]  Physician Information:  Referring Practitioner: Shweta Morel, [address] of care signed (Y/N): []  Yes  [x]  No     Date of Patient follow up with Physician:      Progress Report: []  Yes  [x]  No     Date Range for reporting period:  Beginnin21  Ending:     Progress report due (10 Rx/or 30 days whichever is less):      Recertification due (POC duration/ or 90 days whichever is less):     Visit # Insurance Allowable Auth required? Date Range   8 30  See units below []  Yes  []  No  - 21     Latex Allergy:  [x]NO      []YES  Preferred Language for Healthcare:   [x]English       []other:    Functional Scale:       Date assessed:  Oswestry: raw score =37; dysfunction = 74%  21    Pain level: 410     History: Patient reports a fall in the shower on 5/3/21. She had immediate onset of LBP. Pt denies radicular s/s. Pain has continued and is gradually getting better, but ADLs remain impacted. Patient was referred to PT by her PCP. SUBJECTIVE: Pt states she is hurting a bit more today, some days she moves slower than others. OBJECTIVE: See eval   Observation:    Test measurements:      RESTRICTIONS/PRECAUTIONS:     Exercises/Interventions:     Therapeutic Exercise (90192) Resistance / level Sets / Seconds Reps Notes / Cues   Bike : Seat 2 1. 4mph  5'    IB   2x30\"    HSS seated  Glut str seated   2x20\" B   Tighter on L  Unable to do on L - added to HEP   Foam:  [address] Therapeutic Activities:    [x] (08517 or 54670) Provided verbal/tactile cueing for activities related to improving balance, coordination, kinesthetic sense, posture, motor skill, proprioception and motor activation to allow for proper function  with self care and ADLs  [] (82437) Provided training and instruction to the patient for proper core and proximal hip recruitment and positioning with ambulation re-education     Home Exercise Program:    [x] (04973) Reviewed/Progressed HEP activities related to strengthening, flexibility, endurance, ROM of core, proximal hip and LE for functional self-care, mobility, lifting and ambulation   [] (16064) Reviewed/Progressed HEP activities related to improving balance, coordination, kinesthetic sense, posture, motor skill, proprioception of core, proximal hip and LE for self care, mobility, lifting, and ambulation      Manual Treatments:  PROM / STM / Oscillations-Mobs:  G-I, II, III, IV (PA's, Inf., Post.)  [x] (97161) Provided manual therapy to mobilize proximal hip and LS spine soft tissue/joints for the purpose of modulating pain, promoting relaxation,  increasing ROM, reducing/eliminating soft tissue swelling/inflammation/restriction, improving soft tissue extensibility and allowing for proper ROM for normal function with self care, mobility, lifting and ambulation.      Modalities:     Charges:  Timed Code Treatment Minutes: 45   Total Treatment Minutes: 45       [] EVAL - LOW (38783)   [] EVAL - MOD (99956)  [] EVAL - HIGH (79133)  [] RE-EVAL (61144)  [x] TE (79842) x1      [] Ionto (12769)  [] NMR (97805) x      [] Vaso (94723)  [x] Manual (14795) x1      [] Ultrasound  [x] TA (50281) x 1     [] Mech Traction (95134)  [] Gait Training x     [] ES (un) (43336):   [] Aquatic therapy x   [] Other:   [] Group:     Approval Dates: 5/14 -8/12/21  Date updated: 5/17 Units used Units authorized   TE (85978) 11 50   Man (96184) 6 50   TA (87006) 7 50   MQ (73309) 0 50 Goals:   Patient stated goal: reduce LBP  [x]? Progressing: []? Met: []? Not Met: []? Adjusted     Therapist goals for Patient:   Short Term Goals: To be achieved in: 2 weeks  1. Independent in HEP and progression per patient tolerance, in order to prevent re-injury. []? Progressing: [x]? Met: []? Not Met: []? Adjusted  2. Patient will have a decrease in pain to facilitate improvement in movement, function, and ADLs as indicated by Functional Deficits. []? Progressing: [x]? Met: []? Not Met: []? Adjusted     Long Term Goals: To be achieved in: 6 weeks  1. Disability index score of 50% or less for the JONO to assist with reaching prior level of function. [x]? Progressing: []? Met: []? Not Met: []? Adjusted  2. Patient will demonstrate increased AROM to WNL, good LS mobility, good hip ROM to allow for proper joint functioning as indicated by patients Functional Deficits. [x]? Progressing: []? Met: []? Not Met: []? Adjusted  3. Patient will demonstrate an increase in Strength to good proximal hip and core activation to allow for proper functional mobility as indicated by patients Functional Deficits. [x]? Progressing: []? Met: []? Not Met: []? Adjusted  4. Patient will return to functional activities including stair ambulation, walking community distances, sleeping without increased symptoms or restriction. [x]? Progressing: []? Met: []? Not Met: []? Adjusted  5. Patient will report 60% improvement in pain and function  [x]? Progressing: []? Met: []? Not Met: []? Adjusted            Overall Progression Towards Functional goals/ Treatment Progress Update:  [] Patient is progressing as expected towards functional goals listed. [] Progression is slowed due to complexities/Impairments listed. [] Progression has been slowed due to co-morbidities.   [x] Plan just implemented, too soon to assess goals progression <30days   [] Goals require adjustment due to lack of progress  [] Patient is not progressing as expected and requires additional follow up with physician  [] Other    Persisting Functional Limitations/Impairments:  [x]Sitting [x]Standing   [x]Walking [x]Squatting/bending    [x]Stairs [x]ADL's    [x]Transfers []Reaching  [x]Housework []Job related tasks  []Driving []Sports/Recreation   [x]Sleeping []Other:    ASSESSMENT: Pt tolerated tx well, able to increase resistance with hip abduction strengthening to 5# without issue. Pt required cueing to not hold her breath with ball ab press and bridges exercises. Continues to be tender to touch in R glute, slightly more tender along L glute this date. Stated she was feeling good at end of session. Treatment/Activity Tolerance:  [x] Patient able to complete tx  [] Patient limited by fatique  [] Patient limited by pain  [] Patient limited by other medical complications  [] Other:     Prognosis: [x] Good [] Fair  [] Poor    Patient Requires Follow-up: [x] Yes  [] No    Plan for next treatment session:    PLAN: See eval. PT2x / week for 4-6 weeks. [x] Continue per plan of care [] Alter current plan (see comments)  [] Plan of care initiated [] Hold pending MD visit [] Discharge    Electronically signed by: Kobe Juarez, PTA [id number]      Note: If patient does not return for scheduled/ recommended follow up visits, this note will serve as a discharge from care along with most recent update on progress.

## 2021-07-08 ENCOUNTER — VIRTUAL VISIT (OUTPATIENT)
Dept: PSYCHOLOGY | Age: 66
End: 2021-07-08
Payer: MEDICAID

## 2021-07-08 DIAGNOSIS — F43.10 PTSD (POST-TRAUMATIC STRESS DISORDER): Primary | ICD-10-CM

## 2021-07-08 PROCEDURE — 90832 PSYTX W PT 30 MINUTES: CPT | Performed by: PSYCHOLOGIST
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
## 2021-07-09 ENCOUNTER — HOSPITAL ENCOUNTER (OUTPATIENT)
Dept: PHYSICAL THERAPY | Age: 66
Setting detail: THERAPIES SERIES
Discharge: HOME | End: 2021-07-09
Payer: MEDICAID

## 2021-07-09 NOTE — FLOWSHEET NOTE
[facility]    Physical Therapy  Cancellation/No-show Note  Patient Name:  Aminta Metcalf  :  1955   Date:  2021    Cancelled visits to date: 2  No-shows to date: 2    For today's appointment patient:  [x]  Cancelled  ,   []  Rescheduled appointment  []  No-show , 6/10     Reason given by patient:  [x]  Patient ill  []  Conflicting appointment  []  No transportation    []  Conflict with work  []  No reason given  []  Other:    Comments:      Phone call information:   []  Phone call made today to patient at _ time at number provided:      []  Patient answered, conversation as follows:   []  Patient did not answer, message left as follows:  []  Phone call not made today  [x]  Phone call not needed - pt contacted us to cancel and provided reason for cancellation.      Electronically signed by:  Nadya Jaquez, Yaw Cruz

## 2021-07-13 ENCOUNTER — HOSPITAL ENCOUNTER (OUTPATIENT)
Dept: PHYSICAL THERAPY | Age: 66
Setting detail: THERAPIES SERIES
Discharge: HOME | End: 2021-07-13
Payer: MEDICAID

## 2021-07-13 PROCEDURE — 97530 THERAPEUTIC ACTIVITIES: CPT | Performed by: PHYSICAL THERAPIST

## 2021-07-13 PROCEDURE — 97110 THERAPEUTIC EXERCISES: CPT | Performed by: PHYSICAL THERAPIST

## 2021-07-13 PROCEDURE — 97140 MANUAL THERAPY 1/> REGIONS: CPT | Performed by: PHYSICAL THERAPIST

## 2021-07-13 NOTE — FLOWSHEET NOTE
[facility]  Phone: [phone]  Fax: [phone]    Physical Therapy Treatment Note/ Progress Report:     Date:  2021    Patient Name:  Yoli Marsh    :  1955  MRN: [id number]  Restrictions/Precautions:    Medical/Treatment Diagnosis Information:  Diagnosis: M54.5, W18. 2XXA     Acute LBP after Fall in shower  Treatment Diagnosis: Acute LBP with mm spasm  Insurance/Certification information:  PT Insurance Information: [address]  Physician Information:  Referring Practitioner: Dm Barber [address] of care signed (Y/N): []  Yes  [x]  No     Date of Patient follow up with Physician:      Progress Report: []  Yes  [x]  No     Date Range for reporting period:  Beginnin21  Ending:     Progress report due (10 Rx/or 30 days whichever is less):      Recertification due (POC duration/ or 90 days whichever is less):     Visit # Insurance Allowable Auth required? Date Range   9 30  See units below []  Yes  []  No  - 21     Latex Allergy:  [x]NO      []YES  Preferred Language for Healthcare:   [x]English       []other:    Functional Scale:       Date assessed:  Oswestry: raw score =37; dysfunction = 74%  21    Pain level: 210     History: Patient reports a fall in the shower on 5/3/21. She had immediate onset of LBP. Pt denies radicular s/s. Pain has continued and is gradually getting better, but ADLs remain impacted. Patient was referred to PT by her PCP. SUBJECTIVE: Pt states she is hurting a bit more today, some days she moves slower than others. OBJECTIVE: See eval   Observation:    Test measurements:      RESTRICTIONS/PRECAUTIONS:     Exercises/Interventions:     Therapeutic Exercise (00928) Resistance / level Sets / Seconds Reps Notes / Cues   Bike : Seat 2 1. 4mph  5'    IB   2x30\"    HSS seated  Glut str seated   2x20\" B   Tighter on L  Unable to do on L - added to HEP   Foam:  [address] E[address],EC  HR  GS  Squat  Hip abd  OH lift with med ball             red    30\", 15\"  10  10  10  10 L/R  10x           Hip ext with glider    Resume npv          Mat Ex:  PPT  LTR  SKTC  Bridge w Add set   SLR w TA set  Piriformis Str  TB hip abd in HKLying w TA  SL hip abd  clam               green      2 x 10\"  10  10x L/R  3x10\" B  20  10 R/L  add HEP          Therapeutic Activities (16975)       CC: walking  CC: Hip abd 5#  5#  X 5 [id number]  L/R Fwd & retro only (pn w L s/s)  No alvino ext     FSU Resume npv   Ab press with Red Tball seated   15                  Neuromuscular Re-ed (96154)                                          Manual Intervention (24577)                Roller to LS and B glut   10' Pt in prone w pillow                                             Bold = new ex         Pt. Education:  -pt educated on diagnosis, prognosis and expectations for rehab  -all pt questions were answered    Home Exercise Program:  6/17:  Piriformis str    Access Code: [email or web address]  URL: [email or web address]  Date: 05/13/2021  Prepared by: Allyson Singleton    Exercises  Supine Hip Adduction Isometric with Marlou Hang - 1 x daily - 7 x weekly - 1 sets - 10 reps - 5 hold  Hooklying Single Knee to Chest Stretch - 1 x daily - 7 x weekly - 1 sets - 3 reps - 10 hold  Supine Lower trunk rotation with PLB - 1 x daily - 7 x weekly - 1 sets - 10 reps  Supine Posterior Pelvic Tilt - 1 x daily - 7 x weekly - 1 sets - 10 reps - 5 hold      Therapeutic Exercise and NMR EXR  [x] (27413) Provided verbal/tactile cueing for activities related to strengthening, flexibility, endurance, ROM  for improvements in proximal hip and core control with self care, mobility, lifting and ambulation.  [] (63329) Provided verbal/tactile cueing for activities related to improving balance, coordination, kinesthetic sense, posture, motor skill, proprioception  to assist with core control in self care, mobility, lifting, and ambulation.      Therapeutic Activities: [x] (82513 or 85933) Provided verbal/tactile cueing for activities related to improving balance, coordination, kinesthetic sense, posture, motor skill, proprioception and motor activation to allow for proper function  with self care and ADLs  [] (40294) Provided training and instruction to the patient for proper core and proximal hip recruitment and positioning with ambulation re-education     Home Exercise Program:    [x] (78338) Reviewed/Progressed HEP activities related to strengthening, flexibility, endurance, ROM of core, proximal hip and LE for functional self-care, mobility, lifting and ambulation   [] (51178) Reviewed/Progressed HEP activities related to improving balance, coordination, kinesthetic sense, posture, motor skill, proprioception of core, proximal hip and LE for self care, mobility, lifting, and ambulation      Manual Treatments:  PROM / STM / Oscillations-Mobs:  G-I, II, III, IV (PA's, Inf., Post.)  [x] (67878) Provided manual therapy to mobilize proximal hip and LS spine soft tissue/joints for the purpose of modulating pain, promoting relaxation,  increasing ROM, reducing/eliminating soft tissue swelling/inflammation/restriction, improving soft tissue extensibility and allowing for proper ROM for normal function with self care, mobility, lifting and ambulation.      Modalities:     Charges:  Timed Code Treatment Minutes: 45   Total Treatment Minutes: 45       [] EVAL - LOW (86476)   [] EVAL - MOD (44410)  [] EVAL - HIGH (52264)  [] RE-EVAL (50691)  [x] TE (44942) x1      [] Ionto (95384)  [] NMR (56793) x      [] Vaso (10662)  [x] Manual (38460) x1      [] Ultrasound  [x] TA (45528) x 1     [] Mech Traction (21694)  [] Gait Training x     [] ES (un) (58857):   [] Aquatic therapy x   [] Other:   [] Group:     Approval Dates: 5/14 -8/12/21  Date updated: 5/17 Units used Units authorized   TE (60810) 21 50   Man (94052) 7 50   TA (26951) 3 11   FJ (47264) 0 48               Goals:   Patient additional follow up with physician  [] Other    Persisting Functional Limitations/Impairments:  [x]Sitting [x]Standing   [x]Walking [x]Squatting/bending    [x]Stairs [x]ADL's    [x]Transfers []Reaching  [x]Housework []Job related tasks  []Driving []Sports/Recreation   [x]Sleeping []Other:    ASSESSMENT: Pt tolerated tx well. Needs further core stabs. Continues to be tender to touch in R glute, slightly more tender along L glute this date. Stated she was feeling good at end of session. Treatment/Activity Tolerance:  [x] Patient able to complete tx  [] Patient limited by fatique  [] Patient limited by pain  [] Patient limited by other medical complications  [] Other:     Prognosis: [x] Good [] Fair  [] Poor    Patient Requires Follow-up: [x] Yes  [] No    Plan for next treatment session:    PLAN: See eval. PT2x / week for 4-6 weeks. [x] Continue per plan of care [] Alter current plan (see comments)  [] Plan of care initiated [] Hold pending MD visit [] Discharge    Electronically signed by: Delfino Sneed, PT [id number]      Note: If patient does not return for scheduled/ recommended follow up visits, this note will serve as a discharge from care along with most recent update on progress.

## 2021-07-15 ENCOUNTER — HOSPITAL ENCOUNTER (OUTPATIENT)
Dept: PHYSICAL THERAPY | Age: 66
Setting detail: THERAPIES SERIES
Discharge: HOME | End: 2021-07-15
Payer: MEDICAID

## 2021-07-15 PROCEDURE — 97110 THERAPEUTIC EXERCISES: CPT

## 2021-07-15 PROCEDURE — 97140 MANUAL THERAPY 1/> REGIONS: CPT

## 2021-07-15 NOTE — FLOWSHEET NOTE
[facility]  Phone: [phone]  Fax: [phone]    Physical Therapy Treatment Note/ Progress Report:     Date:  7/15/2021    Patient Name:  Ct Jane    :  1955  MRN: [id number]  Restrictions/Precautions:    Medical/Treatment Diagnosis Information:  Diagnosis: M54.5, W18. 2XXA     Acute LBP after Fall in shower  Treatment Diagnosis: Acute LBP with mm spasm  Insurance/Certification information:  PT Insurance Information: [facility]  Physician Information:  Referring Practitioner: Dm Bartholomew [address] of care signed (Y/N): []  Yes  [x]  No     Date of Patient follow up with Physician:      Progress Report: []  Yes  [x]  No     Date Range for reporting period:  Beginnin21  Ending:     Progress report due (10 Rx/or 30 days whichever is less):      Recertification due (POC duration/ or 90 days whichever is less):     Visit # Insurance Allowable Auth required? Date Range   10 30  See units below []  Yes  []  No  - 21     Latex Allergy:  [x]NO      []YES  Preferred Language for Healthcare:   [x]English       []other:    Functional Scale:       Date assessed:  Oswestry: raw score =37; dysfunction = 74%  21    Pain level: 5-6/10     History: Patient reports a fall in the shower on 5/3/21. She had immediate onset of LBP. Pt denies radicular s/s. Pain has continued and is gradually getting better, but ADLs remain impacted. Patient was referred to PT by her PCP. SUBJECTIVE: Pt states that she is having more low back pain today, wasn't able to walk in [employer]. The past few days have been worse for some reason but she has been doing exercises in the morning. OBJECTIVE: See eval   Observation:    Test measurements:      RESTRICTIONS/PRECAUTIONS:     Exercises/Interventions:     Therapeutic Exercise (28044) Resistance / level Sets / Seconds Reps Notes / Cues   Bike : Seat 2 1. 4mph  5'    IB   2x30\" HSS seated  Glut str seated   2x20\" B   Tighter on L  Unable to do on L - added to HEP   Foam:  NBOC EO,EC  HR  GS  Squat  Hip abd  OH lift with med ball             red    30\", 15\"  10  10  10  10 L/R  10x           Hip ext with glider    Resume npv          Mat Ex:  PPT  LTR  SKTC  Bridge w Add set   SLR w TA set  Piriformis Str  TB hip abd in HKLying w TA  SL hip abd  clam               green      2 x 10\"  10  10x L/R  3x10\" B  20  10 R/L  add HEP          Therapeutic Activities (55464)       CC: walking  CC: Hip abd 5#  5#   Fwd & retro only (pn w L s/s)  No alvino ext     FSU 6\"10 R/L   Ab press with Red Tball seated   15                  Neuromuscular Re-ed (13822)                                          Manual Intervention (31109)              Manual and roller STM to B lat hips and glutes   10' Pt side-lying with pillow between knees                                             Bold = new ex         Pt. Education:  -pt educated on diagnosis, prognosis and expectations for rehab  -all pt questions were answered    Home Exercise Program:  6/17:  Piriformis str    Access Code: [email or web address]  URL: [email or web address]  Date: 05/13/2021  Prepared by: Gustavo Lose    Exercises  Supine Hip Adduction Isometric with Martina Handsome - 1 x daily - 7 x weekly - 1 sets - 10 reps - 5 hold  Hooklying Single Knee to Chest Stretch - 1 x daily - 7 x weekly - 1 sets - 3 reps - 10 hold  Supine Lower trunk rotation with PLB - 1 x daily - 7 x weekly - 1 sets - 10 reps  Supine Posterior Pelvic Tilt - 1 x daily - 7 x weekly - 1 sets - 10 reps - 5 hold      Therapeutic Exercise and NMR EXR  [x] (93519) Provided verbal/tactile cueing for activities related to strengthening, flexibility, endurance, ROM  for improvements in proximal hip and core control with self care, mobility, lifting and ambulation.  [] (15315) Provided verbal/tactile cueing for activities related to improving balance, coordination, kinesthetic sense, posture, motor skill, proprioception  to assist with core control in self care, mobility, lifting, and ambulation. Therapeutic Activities:    [x] (48270 or 35299) Provided verbal/tactile cueing for activities related to improving balance, coordination, kinesthetic sense, posture, motor skill, proprioception and motor activation to allow for proper function  with self care and ADLs  [] (63313) Provided training and instruction to the patient for proper core and proximal hip recruitment and positioning with ambulation re-education     Home Exercise Program:    [x] (97520) Reviewed/Progressed HEP activities related to strengthening, flexibility, endurance, ROM of core, proximal hip and LE for functional self-care, mobility, lifting and ambulation   [] (15484) Reviewed/Progressed HEP activities related to improving balance, coordination, kinesthetic sense, posture, motor skill, proprioception of core, proximal hip and LE for self care, mobility, lifting, and ambulation      Manual Treatments:  PROM / STM / Oscillations-Mobs:  G-I, II, III, IV (PA's, Inf., Post.)  [x] (12799) Provided manual therapy to mobilize proximal hip and LS spine soft tissue/joints for the purpose of modulating pain, promoting relaxation,  increasing ROM, reducing/eliminating soft tissue swelling/inflammation/restriction, improving soft tissue extensibility and allowing for proper ROM for normal function with self care, mobility, lifting and ambulation.      Modalities:     Charges:  Timed Code Treatment Minutes: 43   Total Treatment Minutes: 43       [] EVAL - LOW (25990)   [] EVAL - MOD (15674)  [] EVAL - HIGH (91043)  [] RE-EVAL (39311)  [x] TE (80744) x2      [] Ionto (21896)  [] NMR (79789) x      [] ROEC (47625)  [x] Manual (78789) x1      [] Ultrasound  [] TA (35157) x      [] Mech Traction (87788)  [] Gait Training x     [] ES (un) (66071):   [] Aquatic therapy x   [] Other:   [] Group:     Approval Dates: 5/14 -8/12/21  Date updated: 5/17 Units used Units authorized   TE (92221 22 50   Man ([phone]) 8 50   TA [id number] 51 [phone] 39 [id number] 0 48               Goals:   Patient stated goal: reduce LBP  [x]? Progressing: []? Met: []? Not Met: []? Adjusted     Therapist goals for Patient:   Short Term Goals: To be achieved in: 2 weeks  1. Independent in HEP and progression per patient tolerance, in order to prevent re-injury. []? Progressing: [x]? Met: []? Not Met: []? Adjusted  2. Patient will have a decrease in pain to facilitate improvement in movement, function, and ADLs as indicated by Functional Deficits. []? Progressing: [x]? Met: []? Not Met: []? Adjusted     Long Term Goals: To be achieved in: 6 weeks  1. Disability index score of 50% or less for the JONO to assist with reaching prior level of function. [x]? Progressing: []? Met: []? Not Met: []? Adjusted  2. Patient will demonstrate increased AROM to WNL, good LS mobility, good hip ROM to allow for proper joint functioning as indicated by patients Functional Deficits. [x]? Progressing: []? Met: []? Not Met: []? Adjusted  3. Patient will demonstrate an increase in Strength to good proximal hip and core activation to allow for proper functional mobility as indicated by patients Functional Deficits. [x]? Progressing: []? Met: []? Not Met: []? Adjusted  4. Patient will return to functional activities including stair ambulation, walking community distances, sleeping without increased symptoms or restriction. [x]? Progressing: []? Met: []? Not Met: []? Adjusted  5. Patient will report 60% improvement in pain and function  [x]? Progressing: []? Met: []? Not Met: []? Adjusted            Overall Progression Towards Functional goals/ Treatment Progress Update:  [] Patient is progressing as expected towards functional goals listed. [] Progression is slowed due to complexities/Impairments listed. [] Progression has been slowed due to co-morbidities.   [x] Plan just implemented, too soon to assess goals progression <30days   [] Goals require adjustment due to lack of progress  [] Patient is not progressing as expected and requires additional follow up with physician  [] Other    Persisting Functional Limitations/Impairments:  [x]Sitting [x]Standing   [x]Walking [x]Squatting/bending    [x]Stairs [x]ADL's    [x]Transfers []Reaching  [x]Housework []Job related tasks  []Driving []Sports/Recreation   [x]Sleeping []Other:    ASSESSMENT: Pt required more cueing than usual today, stated that her blood sugar was a little high but that she was feeling ok. Had fair exercise tolerance. Focused manual to B lateral hips and glutes and pt was very tender to touch d/t increased tightness; R glute and L ITB. Encouraged pt to continue with stretches at home to prevent another increase in symptoms. Treatment/Activity Tolerance:  [x] Patient able to complete tx  [] Patient limited by fatique  [] Patient limited by pain  [] Patient limited by other medical complications  [] Other:     Prognosis: [x] Good [] Fair  [] Poor    Patient Requires Follow-up: [x] Yes  [] No    Plan for next treatment session:    PLAN: See eval. PT2x / week for 4-6 weeks. [x] Continue per plan of care [] Alter current plan (see comments)  [] Plan of care initiated [] Hold pending MD visit [] Discharge    Electronically signed by: Francy Greenberg, PTA [id number]      Note: If patient does not return for scheduled/ recommended follow up visits, this note will serve as a discharge from care along with most recent update on progress.

## 2021-07-20 ENCOUNTER — HOSPITAL ENCOUNTER (OUTPATIENT)
Dept: PHYSICAL THERAPY | Age: 66
Setting detail: THERAPIES SERIES
Discharge: HOME | End: 2021-07-20
Payer: MEDICAID

## 2021-07-20 PROCEDURE — 97140 MANUAL THERAPY 1/> REGIONS: CPT | Performed by: PHYSICAL THERAPIST

## 2021-07-20 PROCEDURE — 97110 THERAPEUTIC EXERCISES: CPT | Performed by: PHYSICAL THERAPIST

## 2021-07-20 PROCEDURE — 97530 THERAPEUTIC ACTIVITIES: CPT | Performed by: PHYSICAL THERAPIST

## 2021-07-20 NOTE — FLOWSHEET NOTE
[facility]  Phone: [phone]  Fax: [phone]    Physical Therapy Treatment Note/ Progress Report:     Date:  2021    Patient Name:  Ramiro Armstrong    :  1955  MRN: [id number]  Restrictions/Precautions:    Medical/Treatment Diagnosis Information:  Diagnosis: M54.5, W18. 2XXA     Acute LBP after Fall in shower  Treatment Diagnosis: Acute LBP with mm spasm  Insurance/Certification information:  PT Insurance Information: [facility]  Physician Information:  Referring Practitioner: Izaiah Partida, [address] of care signed (Y/N): []  Yes  [x]  No     Date of Patient follow up with Physician:      Progress Report: []  Yes  [x]  No     Date Range for reporting period:  Beginnin21  Ending:     Progress report due (10 Rx/or 30 days whichever is less):      Recertification due (POC duration/ or 90 days whichever is less):     Visit # Insurance Allowable Auth required? Date Range   30  See units below []  Yes  []  No  - 21     Latex Allergy:  [x]NO      []YES  Preferred Language for Healthcare:   [x]English       []other:    Functional Scale:       Date assessed:  Oswestry: raw score =37; dysfunction = 74%  21  Oswestry: raw score =32; dysfunction =64 %  21        Pain level: 4/10     History: Patient reports a fall in the shower on 5/3/21. She had immediate onset of LBP. Pt denies radicular s/s. Pain has continued and is gradually getting better, but ADLs remain impacted. Patient was referred to PT by her PCP. SUBJECTIVE: Pt was 15 min late for appt today. Pt states that she is having difficulty with transitions like getting out of bed, and with sit-stands. Sleep is better - less disturbance. Stair and longer distance ambulation is much improved.      OBJECTIVE:    Observation: improved TA set, good pelvic alignment, hip weakness noted   Test measurements:      RESTRICTIONS/PRECAUTIONS: Exercises/Interventions:     Therapeutic Exercise (94323) Resistance / level Sets / Seconds Reps Notes / Cues   Bike : Seat 2 1. 4mph      IB   2x30\"    HSS seated  Glut str seated   2x20\" B   Tighter on L  Unable to do on L - added to HEP   Foam:  NBOC EC  HR  GS  Squat  Hip abd  OH lift with med ball             red    30\"  15  15  15  15 L/R  10x           Hip ext with glider    Resume npv          Mat Ex:  PPT  LTR  SKTC  Bridge w Add set   SLR w TA set  Piriformis Str  TB hip abd in HKLying w TA  SL hip abd  clam               Green    orange      2 x 10\"  15  10x L/R  3x10\" B  20  10 R/L  10 L/R HEP          Therapeutic Activities (38759)       CC: walking  CC: Hip abd 5#  5#   Fwd & retro only (pn w L s/s)  No alvino ext     FSU 6\"10 R/L   Ab press with Red Tball seated   15    Lat gait in // bars   2 lengths    Educated pt on using TA set with sit-stand transitions and getting out of bed 7/20/21      Neuromuscular Re-ed (13341)                                          Manual Intervention (81841)              Manual and roller STM to B lat hips and glutes   10' Pt side-lying with pillow between knees                                             Bold = new ex         Pt. Education:  -pt educated on diagnosis, prognosis and expectations for rehab  -all pt questions were answered    Home Exercise Program:  7/20: leonila, SL hip abd  6/17:  Piriformis str    Access Code: [email or web address]  URL: [email or web address]  Date: 05/13/2021  Prepared by: Jamee Tellez    Exercises  Supine Hip Adduction Isometric with Zamorano Randhawa - 1 x daily - 7 x weekly - 1 sets - 10 reps - 5 hold  Hooklying Single Knee to Chest Stretch - 1 x daily - 7 x weekly - 1 sets - 3 reps - 10 hold  Supine Lower trunk rotation with PLB - 1 x daily - 7 x weekly - 1 sets - 10 reps  Supine Posterior Pelvic Tilt - 1 x daily - 7 x weekly - 1 sets - 10 reps - 5 hold      Therapeutic Exercise and NMR EXR  [x] (80067) Provided verbal/tactile cueing for activities related to strengthening, flexibility, endurance, ROM  for improvements in proximal hip and core control with self care, mobility, lifting and ambulation.  [] (08869) Provided verbal/tactile cueing for activities related to improving balance, coordination, kinesthetic sense, posture, motor skill, proprioception  to assist with core control in self care, mobility, lifting, and ambulation. Therapeutic Activities:    [x] (78605 or 98065) Provided verbal/tactile cueing for activities related to improving balance, coordination, kinesthetic sense, posture, motor skill, proprioception and motor activation to allow for proper function  with self care and ADLs  [] (35573) Provided training and instruction to the patient for proper core and proximal hip recruitment and positioning with ambulation re-education     Home Exercise Program:    [x] (72721) Reviewed/Progressed HEP activities related to strengthening, flexibility, endurance, ROM of core, proximal hip and LE for functional self-care, mobility, lifting and ambulation   [] (19204) Reviewed/Progressed HEP activities related to improving balance, coordination, kinesthetic sense, posture, motor skill, proprioception of core, proximal hip and LE for self care, mobility, lifting, and ambulation      Manual Treatments:  PROM / STM / Oscillations-Mobs:  G-I, II, III, IV (PA's, Inf., Post.)  [x] (65083) Provided manual therapy to mobilize proximal hip and LS spine soft tissue/joints for the purpose of modulating pain, promoting relaxation,  increasing ROM, reducing/eliminating soft tissue swelling/inflammation/restriction, improving soft tissue extensibility and allowing for proper ROM for normal function with self care, mobility, lifting and ambulation.      Modalities:     Charges:  Timed Code Treatment Minutes: 43   Total Treatment Minutes: 43       [] EVAL - LOW (95674)   [] EVAL - MOD (68409)  [] EVAL - HIGH (14205)  [] RE-EVAL (30140)  [x] TE (66623) x1      [] Ionto (08999)  [] NMR (77082) x      [] Vaso (32059)  [x] Manual (87399) x1      [] Ultrasound  [x] TA (49280) x 1      [] Mech Traction (78231)  [] Gait Training x     [] ES (un) (37345):   [] Aquatic therapy x   [] Other:   [] Group:     Approval Dates: 5/14 -8/12/21  Date updated: 5/17 Units used Units authorized   TE (81521 23 50   Man (75149) 9 50   TA (30500) 7 42   MB (03980) 0 48               Goals:   Patient stated goal: reduce LBP  [x]? Progressing: []? Met: []? Not Met: []? Adjusted     Therapist goals for Patient:   Short Term Goals: To be achieved in: 2 weeks  1. Independent in HEP and progression per patient tolerance, in order to prevent re-injury. []? Progressing: [x]? Met: []? Not Met: []? Adjusted  2. Patient will have a decrease in pain to facilitate improvement in movement, function, and ADLs as indicated by Functional Deficits. []? Progressing: [x]? Met: []? Not Met: []? Adjusted     Long Term Goals: To be achieved in: 6 weeks  1. Disability index score of 50% or less for the JONO to assist with reaching prior level of function. [x]? Progressing: []? Met: []? Not Met: []? Adjusted  2. Patient will demonstrate increased AROM to WNL, good LS mobility, good hip ROM to allow for proper joint functioning as indicated by patients Functional Deficits. [x]? Progressing: []? Met: []? Not Met: []? Adjusted  3. Patient will demonstrate an increase in Strength to good proximal hip and core activation to allow for proper functional mobility as indicated by patients Functional Deficits. [x]? Progressing: []? Met: []? Not Met: []? Adjusted  4. Patient will return to functional activities including stair ambulation, walking community distances, sleeping without increased symptoms or restriction. [x]? Progressing: []? Met: []? Not Met: []? Adjusted  5. Patient will report 60% improvement in pain and function  [x]? Progressing: []? Met: []? Not Met: []?  Adjusted            Overall Progression Towards Functional goals/ Treatment Progress Update:  [] Patient is progressing as expected towards functional goals listed. [] Progression is slowed due to complexities/Impairments listed. [] Progression has been slowed due to co-morbidities. [x] Plan just implemented, too soon to assess goals progression <30days   [] Goals require adjustment due to lack of progress  [] Patient is not progressing as expected and requires additional follow up with physician  [] Other    Persisting Functional Limitations/Impairments:  [x]Sitting [x]Standing   [x]Walking [x]Squatting/bending    [x]Stairs [x]ADL's    [x]Transfers []Reaching  [x]Housework []Job related tasks  []Driving []Sports/Recreation   [x]Sleeping []Other:    ASSESSMENT: Patient is making gains but has not met goals yet. I feel she would benefit from continuing PT at this time with focus on core stabs. PT educated pt to use TA set with transitional movements to decrease stress to her LB. Treatment/Activity Tolerance:  [x] Patient able to complete tx  [] Patient limited by fatique  [] Patient limited by pain  [] Patient limited by other medical complications  [] Other:     Prognosis: [x] Good [] Fair  [] Poor    Patient Requires Follow-up: [x] Yes  [] No    Plan for next treatment session:    PLAN: See eval. PT2x / week for 4-6 weeks. [x] Continue per plan of care [] Alter current plan (see comments)  [] Plan of care initiated [] Hold pending MD visit [] Discharge    Electronically signed by: Damion Mart, PT [id number]      Note: If patient does not return for scheduled/ recommended follow up visits, this note will serve as a discharge from care along with most recent update on progress.

## 2021-07-27 ENCOUNTER — HOSPITAL ENCOUNTER (OUTPATIENT)
Dept: PHYSICAL THERAPY | Age: 66
Setting detail: THERAPIES SERIES
Discharge: HOME | End: 2021-07-27
Payer: MEDICAID

## 2021-07-27 PROCEDURE — 97140 MANUAL THERAPY 1/> REGIONS: CPT | Performed by: PHYSICAL THERAPIST

## 2021-07-27 PROCEDURE — 97110 THERAPEUTIC EXERCISES: CPT | Performed by: PHYSICAL THERAPIST

## 2021-07-27 NOTE — FLOWSHEET NOTE
RESTRICTIONS/PRECAUTIONS:     Exercises/Interventions:     Therapeutic Exercise (75850) Resistance / level Sets / Seconds Reps Notes / Cues   Bike : Seat 2 1. 4mph      IB   2x30\"    HSS seated     2x20\" B      Foam:  NBOC EC  HR  GS  Squat  Hip abd  OH lift with med ball             red               Hip ext with glider    Resume npv          Mat Ex:  PPT  LTR  SKTC  GSw Add set   SLR w TA set  Piriformis Str  TB hip abd in HKLying w TA  SL hip abd  TB clam               orange    orange    x1010  2 x 10\"  15  10x L/R  3x10\" B  20  10 R/L  15 L/R HEP          Therapeutic Activities (88814)       CC: walking  CC: Hip abd 5#  5#   Fwd & retro only (pn w L s/s)  No alvino ext     FSU 6\"   Ab press with Red Tball seated      Lat gait in // bars      Educated pt on using TA set with sit-stand transitions and getting out of bed 7/20/21      Neuromuscular Re-ed (35080)                                          Manual Intervention (17802)               STM to LS and glutes   10' Pt prone over pillow                                             Bold = new ex         Pt. Education:  -pt educated on diagnosis, prognosis and expectations for rehab  -all pt questions were answered    Home Exercise Program:  7/20: clams, SL hip abd  6/17:  Piriformis str    Access Code: [email or web address]  URL: [email or web address]  Date: 05/13/2021  Prepared by: Ricardo Castellano    Exercises  Supine Hip Adduction Isometric with Anjana Beagle - 1 x daily - 7 x weekly - 1 sets - 10 reps - 5 hold  Hooklying Single Knee to Chest Stretch - 1 x daily - 7 x weekly - 1 sets - 3 reps - 10 hold  Supine Lower trunk rotation with PLB - 1 x daily - 7 x weekly - 1 sets - 10 reps  Supine Posterior Pelvic Tilt - 1 x daily - 7 x weekly - 1 sets - 10 reps - 5 hold      Therapeutic Exercise and NMR EXR  [x] (66879) Provided verbal/tactile cueing for activities related to strengthening, flexibility, endurance, ROM  for improvements in proximal hip and core control with self care, mobility, lifting and ambulation.  [] (36872) Provided verbal/tactile cueing for activities related to improving balance, coordination, kinesthetic sense, posture, motor skill, proprioception  to assist with core control in self care, mobility, lifting, and ambulation. Therapeutic Activities:    [] (46011 or 82938) Provided verbal/tactile cueing for activities related to improving balance, coordination, kinesthetic sense, posture, motor skill, proprioception and motor activation to allow for proper function  with self care and ADLs  [] (06793) Provided training and instruction to the patient for proper core and proximal hip recruitment and positioning with ambulation re-education     Home Exercise Program:    [x] (42841) Reviewed/Progressed HEP activities related to strengthening, flexibility, endurance, ROM of core, proximal hip and LE for functional self-care, mobility, lifting and ambulation   [] (50639) Reviewed/Progressed HEP activities related to improving balance, coordination, kinesthetic sense, posture, motor skill, proprioception of core, proximal hip and LE for self care, mobility, lifting, and ambulation      Manual Treatments:  PROM / STM / Oscillations-Mobs:  G-I, II, III, IV (PA's, Inf., Post.)  [x] (24720) Provided manual therapy to mobilize proximal hip and LS spine soft tissue/joints for the purpose of modulating pain, promoting relaxation,  increasing ROM, reducing/eliminating soft tissue swelling/inflammation/restriction, improving soft tissue extensibility and allowing for proper ROM for normal function with self care, mobility, lifting and ambulation.      Modalities: 7/27: trial IFC with  x 15 min    Charges:  Timed Code Treatment Minutes: 43   Total Treatment Minutes: 58       [] EVAL - LOW (30656)   [] EVAL - MOD (11409)  [] EVAL - HIGH (97427)  [] RE-EVAL (84367)  [x] TE (21491) x2      [] Ionto (43588)  [] NMR (50979) x      [] Vaso (25406)  [x] Manual (32881) x1      [] Ultrasound  [] TA (09485) x 1      [] [facility] Traction (54572)  [] Gait Training x     [] ES (un) (20963):   [] Aquatic therapy x   [] Other:   [] Group:     Approval Dates: 7/23/21 - 10/21/21  Date updated: 7/27 Units used Units authorized   TE (65386) 2 50   Man ([id number]) 1 50   TA (23722)  50   NM (92372)  48               Goals:   Patient stated goal: reduce LBP  [x]? Progressing: []? Met: []? Not Met: []? Adjusted     Therapist goals for Patient:   Short Term Goals: To be achieved in: 2 weeks  1. Independent in HEP and progression per patient tolerance, in order to prevent re-injury. []? Progressing: [x]? Met: []? Not Met: []? Adjusted  2. Patient will have a decrease in pain to facilitate improvement in movement, function, and ADLs as indicated by Functional Deficits. []? Progressing: [x]? Met: []? Not Met: []? Adjusted     Long Term Goals: To be achieved in: 6 weeks  1. Disability index score of 50% or less for the JONO to assist with reaching prior level of function. [x]? Progressing: []? Met: []? Not Met: []? Adjusted  2. Patient will demonstrate increased AROM to WNL, good LS mobility, good hip ROM to allow for proper joint functioning as indicated by patients Functional Deficits. [x]? Progressing: []? Met: []? Not Met: []? Adjusted  3. Patient will demonstrate an increase in Strength to good proximal hip and core activation to allow for proper functional mobility as indicated by patients Functional Deficits. [x]? Progressing: []? Met: []? Not Met: []? Adjusted  4. Patient will return to functional activities including stair ambulation, walking community distances, sleeping without increased symptoms or restriction. [x]? Progressing: []? Met: []? Not Met: []? Adjusted  5. Patient will report 60% improvement in pain and function  [x]? Progressing: []? Met: []? Not Met: []?  Adjusted            Overall Progression Towards Functional goals/ Treatment Progress Update:  [] Patient is progressing as expected towards functional goals listed. [] Progression is slowed due to complexities/Impairments listed. [] Progression has been slowed due to co-morbidities. [x] Plan just implemented, too soon to assess goals progression <30days   [] Goals require adjustment due to lack of progress  [] Patient is not progressing as expected and requires additional follow up with physician  [] Other    Persisting Functional Limitations/Impairments:  [x]Sitting [x]Standing   [x]Walking [x]Squatting/bending    [x]Stairs [x]ADL's    [x]Transfers []Reaching  [x]Housework []Job related tasks  []Driving []Sports/Recreation   [x]Sleeping []Other:    ASSESSMENT: Patient having more pain today. Focus was on pain management - trial ESTIM. Resume core stabs npv. PT recommended to pt to get a TENS unit if she felt the estim made a difference. Treatment/Activity Tolerance:  [x] Patient able to complete tx  [] Patient limited by fatique  [] Patient limited by pain  [] Patient limited by other medical complications  [] Other:     Prognosis: [x] Good [] Fair  [] Poor    Patient Requires Follow-up: [x] Yes  [] No    Plan for next treatment session:    PLAN: See eval. PT2x / week for 4-6 weeks. [x] Continue per plan of care [] Alter current plan (see comments)  [] Plan of care initiated [] Hold pending MD visit [] Discharge    Electronically signed by: Kimberly Cazares, PT [id number]      Note: If patient does not return for scheduled/ recommended follow up visits, this note will serve as a discharge from care along with most recent update on progress.

## 2021-07-30 ENCOUNTER — TELEPHONE (OUTPATIENT)
Dept: INTERNAL MEDICINE CLINIC | Age: 66
End: 2021-07-30

## 2021-07-30 DIAGNOSIS — M54.50 ACUTE BILATERAL LOW BACK PAIN WITHOUT SCIATICA: Primary | ICD-10-CM

## 2021-07-30 NOTE — TELEPHONE ENCOUNTER
----- Message from [address] sent at 7/30/2021  3:54 PM EDT -----  Subject: Message to Provider    QUESTIONS  Information for Provider? .Pt stated she is in physical therapy for her   back, has been in therapy since May. Pt stated she does not seem to be   getting better, believes they may have missed something and would like an   x-ray done on her back and hips. Her physical therapy location stated they   could do the x-ray there, her physical therapy location is [address] [place] [address], pt unsure of the exact name. Pt also stated she has been taking   6 Advil's. Pt would like for someone to call her back. ---------------------------------------------------------------------------  --------------  Jaja MARTI  What is the best way for the office to contact you? OK to leave message on   voicemail  Preferred Call Back Phone Number? [phone]  ---------------------------------------------------------------------------  --------------  SCRIPT ANSWERS  Relationship to Patient?  Self

## 2021-08-02 ENCOUNTER — VIRTUAL VISIT (OUTPATIENT)
Dept: PSYCHOLOGY | Age: 66
End: 2021-08-02
Payer: MEDICAID

## 2021-08-02 DIAGNOSIS — F43.10 PTSD (POST-TRAUMATIC STRESS DISORDER): Primary | ICD-10-CM

## 2021-08-02 PROCEDURE — 90832 PSYTX W PT 30 MINUTES: CPT | Performed by: PSYCHOLOGIST

## 2021-08-02 NOTE — PROGRESS NOTES
TELEHEALTH VISIT -- Audio/Visual (During PUQLA-22 public health emergency)  }  Pursuant to the emergency declaration under the [address] [place] [address], [facility] waiver authority and the [employer] Act, this Virtual Visit was conducted, with patient's consent, to reduce the patient's risk of exposure to COVID-19 and provide continuity of care for an established patient. Services were provided through a video synchronous discussion virtually to substitute for in-person clinic visit. Pt gave verbal informed consent to participate in telehealth services. Conducted a risk-benefit analysis and determined that the patient's presenting problems are consistent with the use of telepsychology. Determined that the patient has sufficient knowledge and skills in the use of technology enabling them to adequately benefit from telepsychology. It was determined that this patient was able to be properly treated without an in-person session. Patient verified that they were currently located at the [facility] address that was provided during registration or another [facility] address, if noted below. Verified the following information:  Patient's identification: Yes  Patient location: [address] [place] [address]   Patient's call back number: [phone]   Patient's emergency contact's name and number, as well as permission to contact them if needed: Extended Emergency Contact Information  Primary Emergency Contact: Sabra Fung [address] Phone: [phone]  Relation: Child     Provider location: [facility]  Elizabeth Corona, Ph.D.  Psychologist  8/2/2021  1:38 PM      Time spent with Patient: 16 minutes  This is patient's 11th  [facility] appointment. Reason for Consult:    Chief Complaint   Patient presents with    Anxiety       Feedback given to PCP. S:  Pt seen for f/u of PTSD.  Pt reported improved mood and sxs, in hindsight thinks she was overstating his sxs at last visit.  called her out-of-the-blue. [place] he is in a different facility than she was told, he is actually not far away and he is in a good facility where he is well cared for. She plans to not seek him out, despite him asking if he can come home. \"It wasn't [any easy decision] to make, but I feel good about it. \" Noted she has not had any nightmares or flashbacks since last appt. Continuing to go to PT w some benefit, but back continues to cause pain \"all the time. \" hopeful she can get some answers soon. O:  MSE:    Appearance    alert, cooperative  Appetite normal  Sleep disturbance No  Fatigue No  Loss of pleasure No  Impulsive behavior No  Speech    spontaneous, normal rate, normal volume and well articulated  Mood    euthymic  Affect    normal affect  Thought Content    intact  Thought Process    linear, goal directed and coherent  Associations    logical connections  Insight    Fair  Judgment    Intact  Orientation    oriented to person, place, time, and general circumstances  Memory    recent and remote memory intact  Attention/Concentration    intact  Morbid ideation No  Suicide Assessment    no suicidal ideation    History:  Social History:   Social History     Socioeconomic History    Marital status: Legally      Spouse name: Not on file    Number of children: Not on file    Years of education: Not on file    Highest education level: Not on file   Occupational History    Not on file   Tobacco Use    Smoking status: Never Smoker    Smokeless tobacco: Never Used   Vaping Use    Vaping Use: Never used   Substance and Sexual Activity    Alcohol use: Not Currently    Drug use: Never    Sexual activity: Not Currently   Other Topics Concern    Not on file   Social History Narrative    Moved from [place] to [place]. Has a daughter, son-in-law and grandchildren live with her.       Social Determinants of Health     Financial Resource Strain:     Difficulty of Paying Living Expenses:    Food Insecurity:     Worried About Running Out of Food in the Last Year:     [address] in the Last Year:    Transportation Needs:     Lack of Transportation (Medical):  Lack of Transportation (Non-Medical):    Physical Activity:     Days of Exercise per Week:     Minutes of Exercise per Session:    Stress:     Feeling of Stress :    Social Connections:     Frequency of Communication with Friends and Family:     Frequency of Social Gatherings with Friends and Family:     Attends Judaism Services:     Active Member of Clubs or Organizations:     Attends Club or Organization Meetings:     Marital Status:    Intimate Partner Violence:     Fear of Current or Ex-Partner:     Emotionally Abused:     Physically Abused:     Sexually Abused:      TOBACCO:   reports that she has never smoked. She has never used smokeless tobacco.  ETOH:   reports previous alcohol use. Diagnosis:  1. PTSD (post-traumatic stress disorder)          Plan:  Pt interventions:  Trained in strategies for increasing balanced thinking, praised progress        Documentation was done using voice recognition dragon software. Every effort was made to ensure accuracy; however, inadvertent, unintentional computerized transcription errors may be present.

## 2021-08-03 ENCOUNTER — HOSPITAL ENCOUNTER (OUTPATIENT)
Dept: PHYSICAL THERAPY | Age: 66
Setting detail: THERAPIES SERIES
Discharge: HOME | End: 2021-08-03
Payer: MEDICAID

## 2021-08-03 DIAGNOSIS — M54.50 ACUTE BILATERAL LOW BACK PAIN WITHOUT SCIATICA: Primary | ICD-10-CM

## 2021-08-03 PROCEDURE — 97140 MANUAL THERAPY 1/> REGIONS: CPT | Performed by: PHYSICAL THERAPIST

## 2021-08-03 PROCEDURE — 97110 THERAPEUTIC EXERCISES: CPT | Performed by: PHYSICAL THERAPIST

## 2021-08-03 NOTE — FLOWSHEET NOTE
[place] , [place]  Phone: [phone]  Fax: [phone]    Physical Therapy Treatment Note/ Progress Report:     Date:  8/3/2021    Patient Name:  Kathlean Brunner    :  1955  MRN: [id number]  Restrictions/Precautions:    Medical/Treatment Diagnosis Information:  Diagnosis: M54.5, W18. 2XXA     Acute LBP after Fall in shower  Treatment Diagnosis: Acute LBP with mm spasm  Insurance/Certification information:  PT Insurance Information: [address] [facility] [facility]  Physician Information:  Referring Practitioner: Dm Elena [address] of care signed (Y/N): []  Yes  [x]  No     Date of Patient follow up with Physician:      Progress Report: []  Yes  [x]  No     Date Range for reporting period:  Beginnin21  Ending:     Progress report due (10 Rx/or 30 days whichever is less):      Recertification due (POC duration/ or 90 days whichever is less):     Visit # Insurance Allowable Auth required? Date Range     See units below []  Yes  []  No  - 21- - 10/21/21     Latex Allergy:  [x]NO      []YES  Preferred Language for Healthcare:   [x]English       []other:    Functional Scale:       Date assessed:  Oswestry: raw score =37; dysfunction = 74%  21  Oswestry: raw score =32; dysfunction =64 %  21      Pain level: 4/10     History: Patient reports a fall in the shower on 5/3/21. She had immediate onset of LBP. Pt denies radicular s/s. Pain has continued and is gradually getting better, but ADLs remain impacted. Patient was referred to PT by her PCP. SUBJECTIVE: Pt has used ice the last 2 days and reports that it has helped. She is still using mm relaxer at night and is sleeping better. She will see Spinal specialist Dr. Lakisha Wolff tomorrow and will wait to get x-ray after seeing him or at his office.       OBJECTIVE:    Observation: improved TA set, good pelvic alignment, hip weakness noted   Test measurements: strengthening, flexibility, endurance, ROM  for improvements in proximal hip and core control with self care, mobility, lifting and ambulation.  [] (54603) Provided verbal/tactile cueing for activities related to improving balance, coordination, kinesthetic sense, posture, motor skill, proprioception  to assist with core control in self care, mobility, lifting, and ambulation. Therapeutic Activities:    [] (96823 or 70951) Provided verbal/tactile cueing for activities related to improving balance, coordination, kinesthetic sense, posture, motor skill, proprioception and motor activation to allow for proper function  with self care and ADLs  [] (82089) Provided training and instruction to the patient for proper core and proximal hip recruitment and positioning with ambulation re-education     Home Exercise Program:    [x] (73961) Reviewed/Progressed HEP activities related to strengthening, flexibility, endurance, ROM of core, proximal hip and LE for functional self-care, mobility, lifting and ambulation   [] (36677) Reviewed/Progressed HEP activities related to improving balance, coordination, kinesthetic sense, posture, motor skill, proprioception of core, proximal hip and LE for self care, mobility, lifting, and ambulation      Manual Treatments:  PROM / STM / Oscillations-Mobs:  G-I, II, III, IV (PA's, Inf., Post.)  [x] (98463) Provided manual therapy to mobilize proximal hip and LS spine soft tissue/joints for the purpose of modulating pain, promoting relaxation,  increasing ROM, reducing/eliminating soft tissue swelling/inflammation/restriction, improving soft tissue extensibility and allowing for proper ROM for normal function with self care, mobility, lifting and ambulation.      Modalities: 8/3:  IFC with  x 15 min    Charges:  Timed Code Treatment Minutes: 43   Total Treatment Minutes: 58       [] EVAL - LOW (72113)   [] EVAL - MOD (14240)  [] EVAL - HIGH (52863)  [] Marie Mejia (87020)  [x] TE (89231) x2 [] Ionto (56988)  [] NMR (13000) x      [] Vaso (03072)  [x] Manual (03394) x1      [] Ultrasound  [] TA (72396) x 1      [] Mech Traction (90854)  [] Gait Training x     [] ES (un) (31968):   [] Aquatic therapy x   [] Other:   [] Group:     Approval Dates: 7/23/21 - 10/21/21  Date updated: 7/27 Units used Units authorized   TE (11452) 4 50   Man (88630) 2 50   TA (77181)  50   NM (96756)  48               Goals:   Patient stated goal: reduce LBP  [x]? Progressing: []? Met: []? Not Met: []? Adjusted     Therapist goals for Patient:   Short Term Goals: To be achieved in: 2 weeks  1. Independent in HEP and progression per patient tolerance, in order to prevent re-injury. []? Progressing: [x]? Met: []? Not Met: []? Adjusted  2. Patient will have a decrease in pain to facilitate improvement in movement, function, and ADLs as indicated by Functional Deficits. []? Progressing: [x]? Met: []? Not Met: []? Adjusted     Long Term Goals: To be achieved in: 6 weeks  1. Disability index score of 50% or less for the JONO to assist with reaching prior level of function. [x]? Progressing: []? Met: []? Not Met: []? Adjusted  2. Patient will demonstrate increased AROM to WNL, good LS mobility, good hip ROM to allow for proper joint functioning as indicated by patients Functional Deficits. [x]? Progressing: []? Met: []? Not Met: []? Adjusted  3. Patient will demonstrate an increase in Strength to good proximal hip and core activation to allow for proper functional mobility as indicated by patients Functional Deficits. [x]? Progressing: []? Met: []? Not Met: []? Adjusted  4. Patient will return to functional activities including stair ambulation, walking community distances, sleeping without increased symptoms or restriction. [x]? Progressing: []? Met: []? Not Met: []? Adjusted  5. Patient will report 60% improvement in pain and function  [x]? Progressing: []? Met: []? Not Met: []?  Adjusted            Overall Progression Towards Functional goals/ Treatment Progress Update:  [] Patient is progressing as expected towards functional goals listed. [] Progression is slowed due to complexities/Impairments listed. [] Progression has been slowed due to co-morbidities. [x] Plan just implemented, too soon to assess goals progression <30days   [] Goals require adjustment due to lack of progress  [] Patient is not progressing as expected and requires additional follow up with physician  [] Other    Persisting Functional Limitations/Impairments:  [x]Sitting [x]Standing   [x]Walking [x]Squatting/bending    [x]Stairs [x]ADL's    [x]Transfers []Reaching  [x]Housework []Job related tasks  []Driving []Sports/Recreation   [x]Sleeping []Other:    ASSESSMENT: Patient having a better day today. She was able to tolerate more core stabs, magy in WB. Cont to have Lx P-S and glut mm guarding. Ice applied after ex with relief. Pt to see spinal specialist - will await orders. Treatment/Activity Tolerance:  [x] Patient able to complete tx  [] Patient limited by fatique  [] Patient limited by pain  [] Patient limited by other medical complications  [] Other:     Prognosis: [x] Good [] Fair  [] Poor    Patient Requires Follow-up: [x] Yes  [] No    Plan for next treatment session:    PLAN: See eval. PT2x / week for 4-6 weeks. [x] Continue per plan of care [] Alter current plan (see comments)  [] Plan of care initiated [] Hold pending MD visit [] Discharge    Electronically signed by: Kimberly Cazares, PT [id number]      Note: If patient does not return for scheduled/ recommended follow up visits, this note will serve as a discharge from care along with most recent update on progress.

## 2021-08-04 ENCOUNTER — OFFICE VISIT (OUTPATIENT)
Dept: ORTHOPEDIC SURGERY | Age: 66
End: 2021-08-04
Payer: MEDICAID

## 2021-08-04 VITALS — BODY MASS INDEX: 32.76 KG/M2 | WEIGHT: 156.09 LBS | HEIGHT: 58 IN

## 2021-08-04 DIAGNOSIS — E11.9 TYPE 2 DIABETES MELLITUS WITHOUT COMPLICATION, WITH LONG-TERM CURRENT USE OF INSULIN (HCC): ICD-10-CM

## 2021-08-04 DIAGNOSIS — M51.36 DDD (DEGENERATIVE DISC DISEASE), LUMBAR: ICD-10-CM

## 2021-08-04 DIAGNOSIS — Z79.4 TYPE 2 DIABETES MELLITUS WITHOUT COMPLICATION, WITH LONG-TERM CURRENT USE OF INSULIN (HCC): ICD-10-CM

## 2021-08-04 DIAGNOSIS — R93.7 ABNORMAL X-RAY OF LUMBAR SPINE: ICD-10-CM

## 2021-08-04 DIAGNOSIS — M54.50 LUMBAR PAIN: ICD-10-CM

## 2021-08-04 DIAGNOSIS — Z88.6 NSAID SENSITIVITY: ICD-10-CM

## 2021-08-04 DIAGNOSIS — M47.816 LUMBAR SPONDYLOSIS: ICD-10-CM

## 2021-08-04 PROCEDURE — G8427 DOCREV CUR MEDS BY ELIG CLIN: HCPCS | Performed by: INTERNAL MEDICINE

## 2021-08-04 PROCEDURE — 99204 OFFICE O/P NEW MOD 45 MIN: CPT | Performed by: INTERNAL MEDICINE

## 2021-08-04 PROCEDURE — 1036F TOBACCO NON-USER: CPT | Performed by: INTERNAL MEDICINE

## 2021-08-04 PROCEDURE — G8417 CALC BMI ABV UP PARAM F/U: HCPCS | Performed by: INTERNAL MEDICINE

## 2021-08-04 PROCEDURE — 3017F COLORECTAL CA SCREEN DOC REV: CPT | Performed by: INTERNAL MEDICINE

## 2021-08-04 PROCEDURE — 3046F HEMOGLOBIN A1C LEVEL >9.0%: CPT | Performed by: INTERNAL MEDICINE

## 2021-08-04 PROCEDURE — 2022F DILAT RTA XM EVC RTNOPTHY: CPT | Performed by: INTERNAL MEDICINE

## 2021-08-04 RX ORDER — CELECOXIB 200 MG/1
200 CAPSULE ORAL DAILY
Qty: 30 CAPSULE | Refills: 1 | Status: SHIPPED | OUTPATIENT
Start: 2021-08-04 | End: 2021-09-23 | Stop reason: SDUPTHER

## 2021-08-04 RX ORDER — TRAMADOL HYDROCHLORIDE 50 MG/1
50 TABLET ORAL EVERY 6 HOURS PRN
Qty: 20 TABLET | Refills: 0 | Status: SHIPPED | OUTPATIENT
Start: 2021-08-04 | End: 2021-08-04 | Stop reason: ALTCHOICE

## 2021-08-04 RX ORDER — HYDROCODONE BITARTRATE AND ACETAMINOPHEN 5; 325 MG/1; MG/1
1 TABLET ORAL EVERY 6 HOURS PRN
Qty: 20 TABLET | Refills: 0 | Status: SHIPPED | OUTPATIENT
Start: 2021-08-04 | End: 2021-08-09

## 2021-08-04 NOTE — PROGRESS NOTES
Chief Complaint:   Chief Complaint   Patient presents with    Lower Back Pain     slipped in shower and fell on back, pain persists, tried PT, helped some, but not enough          History of Present Illness:       Patient is a 61 y.o. female presents with the above complaint. The symptoms began 3 monthsago and started with an injury. The patient describes a sharp, aching pain that does not radiate. The symptoms are near constant  and are show no change since the onset. She inadvertently fell in the shower and experienced the onset of back pain 5/3/2021. Her work-up is included x-rays and she is started a formal course of PT and despite having completed 13 sessions of PT no interval improvement with her low back condition. She has no documented history of prior vertebral impression fractures    The symptoms of back pain  do show a neurogenic claudication pattern and is worsened by standing greater than sitting and improved with sitting and change of position. There is not new onset weakness or progressive weakness of the lower extremities that has developed. The patient denies new onset bowel or bladder dysfunction. There is history of previous spinal trauma in 2020 related to fall x-rays as outlined below    Pain localizes to the lumbar region-lumbosacral    Pain levels: 7     There is no  lower limb pain. The patient has no history or autoimmune disease, inflammatory arthropathy or crystal arthropathy.           Past Medical History:        Past Medical History:   Diagnosis Date    Anxiety     Depression     Diabetes mellitus ([address]     Genital herpes     Hyperlipidemia     Hypertension     PTSD (post-traumatic stress disorder)     Vitamin D deficiency          Past Surgical History:   Procedure Laterality Date    CARPAL TUNNEL RELEASE Bilateral     CATARACT REMOVAL Bilateral     2     SECTION      FINGER TRIGGER RELEASE      7 of them    LEG SURGERY Left     screw and plates from brake    TONSILLECTOMY           Present Medications:         Current Outpatient Medications   Medication Sig Dispense Refill    celecoxib (CELEBREX) 200 MG capsule Take 1 capsule by mouth daily 30 capsule 1    HYDROcodone-acetaminophen (NORCO) 5-325 MG per tablet Take 1 tablet by mouth every 6 hours as needed for Pain for up to 5 days. Intended supply: 5 days.  Take lowest dose possible to manage pain 20 tablet 0    acyclovir (ZOVIRAX) 200 MG capsule Take 2 capsules by mouth twice daily 120 capsule 1    Cholecalciferol (VITAMIN D3) 25 MCG (1000 UT) CAPS Take 2 capsules by mouth daily 180 capsule 1    fluticasone (FLONASE) 50 MCG/ACT nasal spray 1 spray by Each Nostril route daily 1 Bottle 2    amitriptyline (ELAVIL) 100 MG tablet Take 1 tablet by mouth nightly 90 tablet 1    sertraline (ZOLOFT) 100 MG tablet TAKE 1 TABLET BY MOUTH ONCE DAILY 90 tablet 1    pravastatin (PRAVACHOL) 40 MG tablet Take 1 tablet by mouth once daily 90 tablet 1    cyclobenzaprine (FLEXERIL) 5 MG tablet TAKE 1 TABLET BY MOUTH NIGHTLY AS NEEDED FOR MUSCLE SPASM 90 tablet 0    insulin glargine (LANTUS) 100 UNIT/ML injection vial Inject 50 Units into the skin every morning      brimonidine (ALPHAGAN) 0.2 % ophthalmic solution Place 1 drop into both eyes every 12 hours      prednisoLONE acetate (PRED FORTE) 1 % ophthalmic suspension Place 4 drops into both eyes 4 times daily      Calcium-Vitamins C & D (CALCIUM/C/D PO) Take by mouth      Multiple Vitamins-Minerals (MULTI-AFIA PO) Take by mouth      Glucosamine-MSM-Hyaluronic Acd (JOINT HEALTH PO) Take by mouth      aspirin 81 MG tablet Take 81 mg by mouth daily      insulin lispro (HUMALOG) 100 UNIT/ML injection vial Inject 15 Units into the skin 3 times daily (before meals) Indications: with sliding scale Sliding scal   <200 = 0 units   221-250 = 1 unit  251-300 = 2 units   301 - 350 = 3 units   351-400= 4 units   401-430 = 5 units       No current facility-administered medications for this visit. Allergies: Allergies   Allergen Reactions    Atorvastatin Swelling    Lisinopril Other (See Comments)    Naproxen Nausea Only    Oxycodone-Acetaminophen Other (See Comments)        Social History:         Social History     Socioeconomic History    Marital status: Legally      Spouse name: Not on file    Number of children: Not on file    Years of education: Not on file    Highest education level: Not on file   Occupational History    Not on file   Tobacco Use    Smoking status: Never Smoker    Smokeless tobacco: Never Used   Vaping Use    Vaping Use: Never used   Substance and Sexual Activity    Alcohol use: Not Currently    Drug use: Never    Sexual activity: Not Currently   Other Topics Concern    Not on file   Social History Narrative    Moved from [place] to [place]. Has a daughter, son-in-law and grandchildren live with her. Social Determinants of Health     Financial Resource Strain:     Difficulty of Paying Living Expenses:    Food Insecurity:     Worried About Running Out of Food in the Last Year:     [address] in the Last Year:    Transportation Needs:     Lack of Transportation (Medical):      Lack of Transportation (Non-Medical):    Physical Activity:     Days of Exercise per Week:     Minutes of Exercise per Session:    Stress:     Feeling of Stress :    Social Connections:     Frequency of Communication with Friends and Family:     Frequency of Social Gatherings with Friends and Family:     Attends Sabianist Services:     Active Member of Clubs or Organizations:     Attends Club or Organization Meetings:     Marital Status:    Intimate Partner Violence:     Fear of Current or Ex-Partner:     Emotionally Abused:     Physically Abused:     Sexually Abused:         Review of Symptoms:    Pertinent items are noted in HPI    Review of systems reviewed from Patient History Form dated on today's date and   available in the patient's chart under the Media tab. Vital Signs: There were no vitals filed for this visit. General Exam:     Constitutional: Patient is adequately groomed with no evidence of malnutrition  Mental Status: The patient is oriented to time, place and person. The patient's mood and affect are appropriate. Vascular: Examination reveals no swelling or calf tenderness. Peripheral pulses are palpable and 2+. Lymphatics: no lymphadenopathy of the inguinal region or lower extremity      Physical Exam: lower back      Primary Exam:    Inspection: No deformity atrophy appreciable curvature      Palpation: No focal trigger point tenderness      Range of Motion: 50/10 pain with extension greater than flexion      Strength: Normal lower extremity      Special Tests: Negative SLR      Skin: There are no rashes, ulcerations or lesions. Gait: Nonantalgic      Reflex hypoactive at the knees 1+ at the ankles     Additional Comments:        Additional Examinations:           Right Lower Extremity: Examination of the right lower extremity does not show any tenderness, deformity or injury. Range of motion is unremarkable. There is no gross instability. There are no rashes, ulcerations or lesions. Strength and tone are normal.  Left Lower Extremity: Examination of the left lower extremity does not show any tenderness, deformity or injury. Range of motion is unremarkable. There is no gross instability. There are no rashes, ulcerations or lesions. Strength and tone are normal.  Neurolgic -Light touch sensation and manual muscle testing normal L2-S1. No fasiculations. Pattella tendon and Achilles tendon reflexes +2 bilaterally.   Seated SLR negative         Office Imaging Results/Procedures PerformedToday:      Radiology:      X-rays obtained and reviewed in office:   Views views lumbar spine   Location lumbar spine   Impression mild to moderate multilevel spondylosis subtle leftward curvature centered at L2/L3 pelvic obliquity to the left lateral projection reveals inferior endplate irregularity at L1 inconclusive etiologies to consider the relate to remote fracture and/or infection or of degenerative nature. Moderate intervertebral to space narrowing T12-L3. No evidence of spondylolisthesis    The inferior endplate irregularity of L1 represents interval change as compared to x-rays from December 2020. Office Procedures:     Orders Placed This Encounter   Procedures    XR LUMBAR SPINE (2-3 VIEWS)     Standing Status:   Future     Number of Occurrences:   1     Standing Expiration Date:   8/4/2022     Order Specific Question:   Reason for exam:     Answer:   pain    MRI LUMBAR SPINE WO CONTRAST     [facility] [place], please call pt to schedule, [phone]  [facility] will obtain auth and fwd to your facility  Pt advised to f/u in clinic 2-3 days after MRI for results     Standing Status:   Future     Standing Expiration Date:   8/4/2022     Order Specific Question:   Reason for exam:     Answer:   r/o HNP, stenosis; eval L1 vertebral body, r/o osteomyelitis, discitis           Other Outside Imaging and Testing Personally Reviewed:      EXAMINATION:   THREE XRAY VIEWS OF THE LUMBAR SPINE       12/8/2020 3:30 pm       COMPARISON:   None.       HISTORY:   ORDERING SYSTEM PROVIDED HISTORY: Lumbar pain   TECHNOLOGIST PROVIDED HISTORY:   Reason for Exam: Pt fell x 2 weeks ago causing injury to left 4th digit and   lower back   Acuity: Acute   Type of Exam: Initial       FINDINGS:   No evidence of fracture in the lumbar spine or the adjacent osseous   structures.  Normal curvature and alignment.  Moderate diffuse degenerative   changes.  Numerous calcified gallstones are noted incidentally.           Impression   No fracture or other acute abnormality of the lumbar spine.       Moderate spondylosis. Assessment   Impression: . Encounter Diagnoses   Name Primary?     Lumbar spondylosis     DDD (degenerative disc disease), lumbar     Abnormal x-ray of lumbar spine     Lumbar pain     NSAID sensitivity     Type 2 diabetes mellitus without complication, with long-term current use of insulin (HCC)               Plan:       MRI evaluation lumbar spine evaluate for high-grade discopathy/stenosis lower lumbar  Evaluate the L1 vertebral body/L1/L2 intervertebral to space rule out discitis or acute on chronic vertebral compression fracture  Celebrex with GI precaution and Norco short-term avoid use of Ultram relative to possible interaction with SSRI/TCA scription medications  Activity modification lumbar disc protocol    The inferior endplate irregularity of L1 represents interval change as compared to x-rays from December 2020. Proceed as outlined above      The nature of the finding, probable diagnosis and likely treatment was thoroughly discussed with the patient. The options, risks, complications, alternative treatment as well as some of the differential diagnosis was discussed. The patient was thoroughly informed and all questions were answered. the patient indicated understanding and satisfaction with the discussion. Orders:        Orders Placed This Encounter   Procedures    XR LUMBAR SPINE (2-3 VIEWS)     Standing Status:   Future     Number of Occurrences:   1     Standing Expiration Date:   8/4/2022     Order Specific Question:   Reason for exam:     Answer:   pain    MRI LUMBAR SPINE WO CONTRAST     [facility] [place] [facility], please call pt to schedule, [phone]  [facility] will obtain auth and fwd to your facility  Pt advised to f/u in clinic 2-3 days after MRI for results     Standing Status:   Future     Standing Expiration Date:   8/4/2022     Order Specific Question:   Reason for exam:     Answer:   r/o HNP, stenosis; eval L1 vertebral body, r/o osteomyelitis, discitis           Disclaimer: \"This note was dictated with voice recognition software.  Though review and correction are routine, we apologize for any errors. \"

## 2021-08-05 ENCOUNTER — APPOINTMENT (OUTPATIENT)
Dept: PHYSICAL THERAPY | Age: 66
End: 2021-08-05
Payer: MEDICAID

## 2021-08-10 ENCOUNTER — APPOINTMENT (OUTPATIENT)
Dept: PHYSICAL THERAPY | Age: 66
End: 2021-08-10
Payer: MEDICAID

## 2021-08-17 ENCOUNTER — OFFICE VISIT (OUTPATIENT)
Dept: ORTHOPEDIC SURGERY | Age: 66
End: 2021-08-17
Payer: MEDICAID

## 2021-08-17 ENCOUNTER — APPOINTMENT (OUTPATIENT)
Dept: PHYSICAL THERAPY | Age: 66
End: 2021-08-17
Payer: MEDICAID

## 2021-08-17 VITALS — WEIGHT: 156 LBS | BODY MASS INDEX: 32.75 KG/M2 | HEIGHT: 58 IN

## 2021-08-17 DIAGNOSIS — M47.816 LUMBAR SPONDYLOSIS: ICD-10-CM

## 2021-08-17 DIAGNOSIS — M51.36 DDD (DEGENERATIVE DISC DISEASE), LUMBAR: ICD-10-CM

## 2021-08-17 DIAGNOSIS — S32.010S CLOSED WEDGE COMPRESSION FRACTURE OF L1 VERTEBRA, SEQUELA: Primary | ICD-10-CM

## 2021-08-17 DIAGNOSIS — M80.00XG OSTEOPOROSIS WITH CURRENT PATHOLOGICAL FRACTURE WITH DELAYED HEALING, UNSPECIFIED OSTEOPOROSIS TYPE, SUBSEQUENT ENCOUNTER: ICD-10-CM

## 2021-08-17 PROCEDURE — 3017F COLORECTAL CA SCREEN DOC REV: CPT | Performed by: INTERNAL MEDICINE

## 2021-08-17 PROCEDURE — G8427 DOCREV CUR MEDS BY ELIG CLIN: HCPCS | Performed by: INTERNAL MEDICINE

## 2021-08-17 PROCEDURE — L0642 LO SAG RI AN/POS PNL PRE OTS: HCPCS | Performed by: INTERNAL MEDICINE

## 2021-08-17 PROCEDURE — 1036F TOBACCO NON-USER: CPT | Performed by: INTERNAL MEDICINE

## 2021-08-17 PROCEDURE — G8417 CALC BMI ABV UP PARAM F/U: HCPCS | Performed by: INTERNAL MEDICINE

## 2021-08-17 PROCEDURE — 99214 OFFICE O/P EST MOD 30 MIN: CPT | Performed by: INTERNAL MEDICINE

## 2021-08-17 RX ORDER — HYDROCODONE BITARTRATE AND ACETAMINOPHEN 5; 325 MG/1; MG/1
1 TABLET ORAL EVERY 8 HOURS PRN
Qty: 21 TABLET | Refills: 0 | Status: SHIPPED | OUTPATIENT
Start: 2021-08-17 | End: 2021-08-24

## 2021-08-17 NOTE — PROGRESS NOTES
Chief Complaint:   Chief Complaint   Patient presents with    Follow-up     FU LUMBAR MRI TR. pt reports no new falls, injuries, or symptoms. History of Present Illness:       Patient is a 61 y.o. female presents with the above complaint. Patient was seen approximately 2 weeks ago symptoms show no appreciable change. Pain has been better controlled with Celebrex and Norco.  She denies any side effects with respect to narcotic analgesia    No new injuries no new events MRI completed in the interim which is outlined below in detail    Pain levels 7/10    Sitting equally problematic as standing. Symptoms worsen with immobility whether sitting or standing.     She describes the pain is aching mid lumbar        Past Medical History:        Past Medical History:   Diagnosis Date    Anxiety     Depression     Diabetes mellitus ([facility] [address]     Genital herpes     Hyperlipidemia     Hypertension     PTSD (post-traumatic stress disorder)     Vitamin D deficiency          Past Surgical History:   Procedure Laterality Date    CARPAL TUNNEL RELEASE Bilateral     CATARACT REMOVAL Bilateral     2     SECTION      FINGER TRIGGER RELEASE      7 of them    LEG SURGERY Left     screw and plates from brake    TONSILLECTOMY           Present Medications:         Current Outpatient Medications   Medication Sig Dispense Refill    celecoxib (CELEBREX) 200 MG capsule Take 1 capsule by mouth daily 30 capsule 1    acyclovir (ZOVIRAX) 200 MG capsule Take 2 capsules by mouth twice daily 120 capsule 1    Cholecalciferol (VITAMIN D3) 25 MCG (1000 UT) CAPS Take 2 capsules by mouth daily 180 capsule 1    fluticasone (FLONASE) 50 MCG/ACT nasal spray 1 spray by Each Nostril route daily 1 Bottle 2    amitriptyline (ELAVIL) 100 MG tablet Take 1 tablet by mouth nightly 90 tablet 1    sertraline (ZOLOFT) 100 MG tablet TAKE 1 TABLET BY MOUTH ONCE DAILY 90 tablet 1    pravastatin (PRAVACHOL) 40 MG tablet Take 1 tablet by mouth once daily 90 tablet 1    cyclobenzaprine (FLEXERIL) 5 MG tablet TAKE 1 TABLET BY MOUTH NIGHTLY AS NEEDED FOR MUSCLE SPASM 90 tablet 0    insulin glargine (LANTUS) 100 UNIT/ML injection vial Inject 50 Units into the skin every morning      brimonidine (ALPHAGAN) 0.2 % ophthalmic solution Place 1 drop into both eyes every 12 hours      prednisoLONE acetate (PRED FORTE) 1 % ophthalmic suspension Place 4 drops into both eyes 4 times daily      Calcium-Vitamins C & D (CALCIUM/C/D PO) Take by mouth      Multiple Vitamins-Minerals (MULTI-AFIA PO) Take by mouth      Glucosamine-MSM-Hyaluronic Acd (JOINT HEALTH PO) Take by mouth      aspirin 81 MG tablet Take 81 mg by mouth daily      insulin lispro (HUMALOG) 100 UNIT/ML injection vial Inject 15 Units into the skin 3 times daily (before meals) Indications: with sliding scale Sliding scal   <200 = 0 units   221-250 = 1 unit  251-300 = 2 units   301 - 350 = 3 units   351-400= 4 units   401-430 = 5 units       No current facility-administered medications for this visit. Allergies: Allergies   Allergen Reactions    Atorvastatin Swelling    Lisinopril Other (See Comments)    Naproxen Nausea Only    Oxycodone-Acetaminophen Other (See Comments)        Social History:         Social History     Socioeconomic History    Marital status: Legally      Spouse name: Not on file    Number of children: Not on file    Years of education: Not on file    Highest education level: Not on file   Occupational History    Not on file   Tobacco Use    Smoking status: Never Smoker    Smokeless tobacco: Never Used   Vaping Use    Vaping Use: Never used   Substance and Sexual Activity    Alcohol use: Not Currently    Drug use: Never    Sexual activity: Not Currently   Other Topics Concern    Not on file   Social History Narrative    Moved from [place] to [place]. Has a daughter, son-in-law and grandchildren live with her.       Social Determinants Answer:   current fx at [facility], call [phone] to schedule     Standing Status:   Future     Standing Expiration Date:   8/17/2022     Scheduling Instructions:      SPECT/CT-see accompanying orders for NM BONE SPECT     Order Specific Question:   Reason for exam:     Answer:   eval L1 vertebral fx    NM BONE SPECT SINGLE AREA     [facility], call [phone] to schedule     Standing Status:   Future     Standing Expiration Date:   8/17/2022     Scheduling Instructions:      SPECT/CT-see accompanying CT orders     Order Specific Question:   Reason for exam:     Answer:   eval L1 vertebral fx    Breg Quick Draw Lumbar Brace     Patient was prescribed a Breg Quick Draw Lumbar Brace. The lumbar spine will require stabilization / immobilization from this semi-rigid / rigid orthosis to improve their function. The orthosis will assist in protecting the affected area, provide functional support and facilitate healing. This orthosis is required for the following reasons:    Reduce pain by restricting mobility of the trunk    The patient was educated and fit by a healthcare professional with expert knowledge and specialization in brace application while under the direct supervision of the physician. Verbal and written instructions for the use of and application of this item were provided. They were instructed to contact the office immediately should the brace result in increased pain, decreased sensation, increased swelling or worsening of the condition. Other Outside Imaging and Testing Personally Reviewed:    Standing lateral x-ray-thoracolumbar single view  There is inferior endplate irregularity at the L1 vertebral body with discontinuity of the anterior and inferior cortex of the vertebral body suggestive of fracture. There is suggestion of at least mild anterior compression of the fracture correlating with findings on MRI evaluation.         MRI LUMBAR loss.  Left foraminal protrusion-type herniation combined with facet arthropathy contributes to moderate left foraminal narrowing with moderate impingement on the exiting L1 nerve root sleeve. L2-L3: Disc desiccation. Bilobed foraminal protrusions left greater than right combined with facet arthropathy produce moderate foraminal narrowing bilaterally and contact of the exiting L2 nerve root sleeves. No canal stenosis. L3-L4: Chronic compression deformity along the superior endplate of the L3 vertebral body with approximately 25% loss of vertebral height. Sterile osteochondrosis. Disc desiccation. Moderate intervertebral disc space height loss. Bilobed foraminal protrusions combined with facet arthropathy produces moderate right foraminal narrowing with impingement on the exiting right L3 nerve root sleeve. Mild left foraminal narrowing. Thickening of the ligamentum flavum  contributes to moderate canal stenosis. L4-L5: Sterile osteochondrosis. Disc desiccation. Broad-based disc protrusion. No canal stenosis. Thickening of the ligamentum flavum with moderate facet arthropathy contribute to produce moderate canal stenosis and moderate foraminal narrowing. L5-S1: Disc desiccation. Small central disc protrusion. No canal stenosis. Moderate facet arthropathy and thickening of the ligamentum flavum. Bilateral facet capsulitis. Mild bilateral foraminal narrowing. Prevertebral and paraspinal soft tissues demonstrate moderate sarcopenia. Edema within the posterior soft tissue may reflect a contusion. Visualized soft tissues of the abdomen and pelvis are normal. No aortic aneurysm or atherosclerosis of the aorta noted. No signs of retroaortic renal vein or other anomaly. No renal or adrenal masses are identified. No incidental pelvic masses present. CONCLUSION: 1. No evidence of osteomyelitis or discitis. 2. Subacute complex fracture along the inferior endplate of L1. No evidence of neoplastic infiltration.  3. At L1-L2, moderate impingement on the exiting left L1 nerve root sleeve secondary to a left foraminal protrusion-type herniation in conjunction with facet arthropathy. 4. At L3-L4, moderate canal stenosis and impingement on the exiting right L3 nerve root sleeve secondary to bilobed foraminal protrusions in conjunction with facet arthropathy. Chronic compression deformity along the L3 vertebral body. Consider DEXA scan for further evaluation. Thank you for the opportunity to provide your interpretation. Amna Nguyễn MD A: JEANMARIE/BRIAN/rosa elena 08/13/2021 9:07 AM T: ROAS ELENA 08/12/2021 5:17 PM              Assessment   Impression: . Encounter Diagnoses   Name Primary?  Closed wedge compression fracture of L1 vertebra, sequela Yes    DDD (degenerative disc disease), lumbar     Lumbar spondylosis     Osteoporosis with current pathological fracture with delayed healing, unspecified osteoporosis type, subsequent encounter               Plan:     LSO immobilization with ADLs  SPECT CT scan lumbar spine evaluate L1 vertebral fracture and consider her candidate for vertebroplasty as needed  Medical pain management as per previous Celebrex and as needed use of Ultram minimize use of narcotics  DEXA scan evaluate the severity of osteoporosis suspected clinically      The nature of the finding, probable diagnosis and likely treatment was thoroughly discussed with the patient. The options, risks, complications, alternative treatment as well as some of the differential diagnosis was discussed. The patient was thoroughly informed and all questions were answered. the patient indicated understanding and satisfaction with the discussion.       Orders:        Orders Placed This Encounter   Procedures    [address] [facility], call [phone] to schedule     Standing Status:   Future     Standing Expiration Date:   8/17/2022     Order Specific Question:   Reason for exam:     Answer:   current fx at [facility], call [phone] to schedule     Standing Status:   Future     Standing Expiration Date:   8/17/2022     Scheduling Instructions:      SPECT/CT-see accompanying orders for NM BONE SPECT     Order Specific Question:   Reason for exam:     Answer:   eval L1 vertebral fx    NM BONE SPECT SINGLE AREA     [facility], call [phone] to schedule     Standing Status:   Future     Standing Expiration Date:   8/17/2022     Scheduling Instructions:      SPECT/CT-see accompanying CT orders     Order Specific Question:   Reason for exam:     Answer:   eval L1 vertebral fx    Breg Quick Draw Lumbar Brace     Patient was prescribed a Breg Quick Draw Lumbar Brace. The lumbar spine will require stabilization / immobilization from this semi-rigid / rigid orthosis to improve their function. The orthosis will assist in protecting the affected area, provide functional support and facilitate healing. This orthosis is required for the following reasons:    Reduce pain by restricting mobility of the trunk    The patient was educated and fit by a healthcare professional with expert knowledge and specialization in brace application while under the direct supervision of the physician. Verbal and written instructions for the use of and application of this item were provided. They were instructed to contact the office immediately should the brace result in increased pain, decreased sensation, increased swelling or worsening of the condition. Disclaimer: \"This note was dictated with voice recognition software. Though review and correction are routine, we apologize for any errors. \"

## 2021-08-19 ENCOUNTER — TELEPHONE (OUTPATIENT)
Dept: ORTHOPEDIC SURGERY | Age: 66
End: 2021-08-19

## 2021-08-19 NOTE — TELEPHONE ENCOUNTER
General Question     Subject: QUESTIONS ABOUT ORDERS AND GETTING SCHD   Patient and /or Facility Request: PATIENT   Contact Number: [phone]

## 2021-08-26 ENCOUNTER — HOSPITAL ENCOUNTER (OUTPATIENT)
Dept: CT IMAGING | Age: 66
Discharge: HOME | End: 2021-08-26
Payer: MEDICAID

## 2021-08-26 ENCOUNTER — HOSPITAL ENCOUNTER (OUTPATIENT)
Dept: NUCLEAR MEDICINE | Age: 66
Discharge: HOME | End: 2021-08-26
Payer: MEDICAID

## 2021-08-26 DIAGNOSIS — M47.816 LUMBAR SPONDYLOSIS: ICD-10-CM

## 2021-08-26 DIAGNOSIS — S32.010S CLOSED WEDGE COMPRESSION FRACTURE OF L1 VERTEBRA, SEQUELA: ICD-10-CM

## 2021-08-26 PROCEDURE — 78803 RP LOCLZJ TUM SPECT 1 AREA: CPT

## 2021-08-26 PROCEDURE — A9503 TC99M MEDRONATE: HCPCS | Performed by: INTERNAL MEDICINE

## 2021-08-26 PROCEDURE — 3430000000 HC RX DIAGNOSTIC RADIOPHARMACEUTICAL: Performed by: INTERNAL MEDICINE

## 2021-08-26 PROCEDURE — 72131 CT LUMBAR SPINE W/O DYE: CPT

## 2021-08-26 RX ORDER — TC 99M MEDRONATE 20 MG/10ML
24.72 INJECTION, POWDER, LYOPHILIZED, FOR SOLUTION INTRAVENOUS
Status: COMPLETED | OUTPATIENT
Start: 2021-08-26 | End: 2021-08-26

## 2021-08-26 RX ADMIN — TC 99M MEDRONATE 24.72 MILLICURIE: 20 INJECTION, POWDER, LYOPHILIZED, FOR SOLUTION INTRAVENOUS at 12:04

## 2021-08-31 ENCOUNTER — OFFICE VISIT (OUTPATIENT)
Dept: ORTHOPEDIC SURGERY | Age: 66
End: 2021-08-31
Payer: MEDICAID

## 2021-08-31 VITALS — WEIGHT: 156.09 LBS | BODY MASS INDEX: 32.76 KG/M2 | HEIGHT: 58 IN

## 2021-08-31 DIAGNOSIS — G89.29 CHRONIC RIGHT SHOULDER PAIN: ICD-10-CM

## 2021-08-31 DIAGNOSIS — M51.36 DDD (DEGENERATIVE DISC DISEASE), LUMBAR: ICD-10-CM

## 2021-08-31 DIAGNOSIS — M25.511 CHRONIC RIGHT SHOULDER PAIN: ICD-10-CM

## 2021-08-31 DIAGNOSIS — M47.816 LUMBAR SPONDYLOSIS: ICD-10-CM

## 2021-08-31 DIAGNOSIS — M80.00XG OSTEOPOROSIS WITH CURRENT PATHOLOGICAL FRACTURE WITH DELAYED HEALING, UNSPECIFIED OSTEOPOROSIS TYPE, SUBSEQUENT ENCOUNTER: ICD-10-CM

## 2021-08-31 DIAGNOSIS — S32.010S CLOSED WEDGE COMPRESSION FRACTURE OF L1 VERTEBRA, SEQUELA: Primary | ICD-10-CM

## 2021-08-31 PROCEDURE — 99214 OFFICE O/P EST MOD 30 MIN: CPT | Performed by: INTERNAL MEDICINE

## 2021-08-31 PROCEDURE — 1036F TOBACCO NON-USER: CPT | Performed by: INTERNAL MEDICINE

## 2021-08-31 PROCEDURE — G8417 CALC BMI ABV UP PARAM F/U: HCPCS | Performed by: INTERNAL MEDICINE

## 2021-08-31 PROCEDURE — G8427 DOCREV CUR MEDS BY ELIG CLIN: HCPCS | Performed by: INTERNAL MEDICINE

## 2021-08-31 PROCEDURE — 3017F COLORECTAL CA SCREEN DOC REV: CPT | Performed by: INTERNAL MEDICINE

## 2021-08-31 RX ORDER — HYDROCODONE BITARTRATE AND ACETAMINOPHEN 5; 325 MG/1; MG/1
1 TABLET ORAL EVERY 8 HOURS PRN
Qty: 30 TABLET | Refills: 0 | Status: SHIPPED | OUTPATIENT
Start: 2021-08-31 | End: 2021-09-10

## 2021-08-31 NOTE — PROGRESS NOTES
Chief Complaint:   Chief Complaint   Patient presents with    Lower Back Pain     a little better, brace helpful, mornings are the worst, feel better at night when active in the day, TR SPECT/CT          History of Present Illness:       Patient is a 61 y.o. female returns follow up for the above complaint. The patient was last seen approximately 2 weeksago. The symptoms show no change since the last visit. The patient has had further testing for the problem. New    In the interim SPECT CT scan lumbar spine was completed    No new injuries no new events    Sitting better tolerated than standing    Pain levels 3-9/10 worse by morning    Back pain to leg pain ratio 100:0    Majority of back pain localized to the lower lumbar region    She denies any new onset progressive weakness of lower extremities or new onset bowel or bladder dysfunction    She continues on narcotic analgesia intermittently without side effect     Past Medical History:        Past Medical History:   Diagnosis Date    Anxiety     Depression     Diabetes mellitus (HCC)     Genital herpes     Hyperlipidemia     Hypertension     PTSD (post-traumatic stress disorder)     Vitamin D deficiency         Present Medications:         Current Outpatient Medications   Medication Sig Dispense Refill    HYDROcodone-acetaminophen (NORCO) 5-325 MG per tablet Take 1 tablet by mouth every 8 hours as needed for Pain for up to 10 days.  Take lowest dose possible to manage pain 30 tablet 0    celecoxib (CELEBREX) 200 MG capsule Take 1 capsule by mouth daily 30 capsule 1    acyclovir (ZOVIRAX) 200 MG capsule Take 2 capsules by mouth twice daily 120 capsule 1    Cholecalciferol (VITAMIN D3) 25 MCG (1000 UT) CAPS Take 2 capsules by mouth daily 180 capsule 1    fluticasone (FLONASE) 50 MCG/ACT nasal spray 1 spray by Each Nostril route daily 1 Bottle 2    amitriptyline (ELAVIL) 100 MG tablet Take 1 tablet by mouth nightly 90 tablet 1    sertraline (ZOLOFT) 100 MG tablet TAKE 1 TABLET BY MOUTH ONCE DAILY 90 tablet 1    pravastatin (PRAVACHOL) 40 MG tablet Take 1 tablet by mouth once daily 90 tablet 1    cyclobenzaprine (FLEXERIL) 5 MG tablet TAKE 1 TABLET BY MOUTH NIGHTLY AS NEEDED FOR MUSCLE SPASM 90 tablet 0    insulin glargine (LANTUS) 100 UNIT/ML injection vial Inject 50 Units into the skin every morning      brimonidine (ALPHAGAN) 0.2 % ophthalmic solution Place 1 drop into both eyes every 12 hours      prednisoLONE acetate (PRED FORTE) 1 % ophthalmic suspension Place 4 drops into both eyes 4 times daily      Calcium-Vitamins C & D (CALCIUM/C/D PO) Take by mouth      Multiple Vitamins-Minerals (MULTI-AFIA PO) Take by mouth      Glucosamine-MSM-Hyaluronic Acd (JOINT HEALTH PO) Take by mouth      aspirin 81 MG tablet Take 81 mg by mouth daily      insulin lispro (HUMALOG) 100 UNIT/ML injection vial Inject 15 Units into the skin 3 times daily (before meals) Indications: with sliding scale Sliding scal   <200 = 0 units   221-250 = 1 unit  251-300 = 2 units   301 - 350 = 3 units   351-400= 4 units   401-430 = 5 units       No current facility-administered medications for this visit. Allergies: Allergies   Allergen Reactions    Atorvastatin Swelling    Lisinopril Other (See Comments)    Naproxen Nausea Only    Oxycodone-Acetaminophen Other (See Comments)           Review of Systems:    Pertinent items are noted in HPI         Vital Signs: There were no vitals filed for this visit. General Exam:     Constitutional: Patient is adequately groomed with no evidence of malnutrition    Physical Exam: lower back      Primary Exam:    Inspection: Deformity atrophy appreciable curvature      Palpation: Mild tenderness upper lumbar axial      Range of Motion: Purposely not assessed      Strength: Normal lower extremity      Special Tests: Negative SLR      Skin: There are no rashes, ulcerations or lesions.       Gait: Nonantalgic    Neurovascular - non focal and intact       Additional Comments:        Additional Examinations:                Office Imaging Results/Procedures PerformedToday:             Office Procedures:   No orders of the defined types were placed in this encounter. Other Outside Imaging and Testing Personally Reviewed:    CT LUMBAR SPINE WO CONTRAST    Result Date: 8/26/2021  EXAMINATION: CT OF THE LUMBAR SPINE WITHOUT CONTRAST  8/26/2021 TECHNIQUE: CT of the lumbar spine was performed without the administration of intravenous contrast. Multiplanar reformatted images are provided for review. Dose modulation, iterative reconstruction, and/or weight based adjustment of the mA/kV was utilized to reduce the radiation dose to as low as reasonably achievable. COMPARISON: Bone scan on 08/26/2021, radiographs on 08/17/2021. HISTORY: ORDERING SYSTEM PROVIDED HISTORY: Lumbar spondylosis TECHNOLOGIST PROVIDED HISTORY: [facility], call [phone] to schedule Reason for exam:->eval L1 vertebral fx Reason for Exam: eval L1 vertebral fx Acuity: Unknown Type of Exam: Unknown FINDINGS: BONES/ALIGNMENT: There is normal alignment of the lumbar spine. There is an L1 inferior endplate fracture with approximately 50% loss of vertebral body height, stable. There is also a left L3 superior endplate Schmorl's node/fracture which has mild uptake on the bone scan suggesting this is likely subacute. No other acute fractures are identified. There is levocurvature of the lumbar spine, apex at L3-L4. No hubert or retrolisthesis. DEGENERATIVE CHANGES: There is multilevel vacuum phenomenon, most pronounced at L1-L2 through L3-L4. SOFT TISSUES/RETROPERITONEUM: Atherosclerotic plaque is noted in the aorta and its branch vessels. No retroperitoneal adenopathy. No areas of abnormal soft tissue swelling are identified. L1-L2: There is minimal retropulsion of the L1 inferior endplate into the thecal sac and the left paracentral region measuring 3 mm. There is a disc bulge lateralizing to the left. Minimal central spinal canal narrowing. Moderate left foraminal narrowing. No right foraminal narrowing. L2-L3: There is a circumferential disc bulge. Facet arthropathy is noted. Mild central spinal canal narrowing. Mild bilateral foraminal narrowing. L3-L4: There is a disc bulge lateralizing to the right. Bilateral facet arthropathy. Moderate central spinal canal narrowing. Mild right and minimal left foraminal narrowing. L4-L5: There is a circumferential disc bulge. Facet arthropathy is noted. Moderate central spinal canal narrowing. No left foraminal narrowing. Mild right foraminal narrowing. L5-S1: There is a minimal disc bulge. No central spinal canal stenosis. No foraminal narrowing. Degenerative changes are noted along the sacroiliac joints. 1. L1 inferior endplate fracture with 67% loss of vertebral body height, not appreciably changed. This has avid radiotracer uptake suggesting this is acute/subacute. 2. Left L3 superior endplate Schmorl's node/fracture with milder FDG uptake on the bone scan, suggesting that this is subacute. 3. No other fractures are identified. NM BONE SPECT SINGLE AREA    Result Date: 8/27/2021  EXAMINATION: BONE SCAN WITH SPECT IMAGING  8/26/2021 11:47 am TECHNIQUE: The patient was injected intravenously with 24.79 mCi of 99 mTc MDP and SPECT images of the lower spine were acquired approximately three hours later. COMPARISON: CT dated 08/26/2021 HISTORY: ORDERING SYSTEM PROVIDED HISTORY: Lumbar spondylosis TECHNOLOGIST PROVIDED HISTORY: [facility], call [phone] to schedule Reason for exam:->eval L1 vertebral fx Reason for Exam: Compression Fracture L1 Acuity: Unknown Type of Exam: Ongoing FINDINGS: Intense activity is demonstrated at L1 where inferior endplate compression fracture had been seen on recent CT.  A lesser degree of activity is seen along the superior endplate of L3 where there is concavity demonstrated on CT to the left of midline. Mild activity is seen within the lower lumbar spine, likely reflecting those degenerative changes seen on CT. Activity at bilateral shoulders, right greater than left sternoclavicular joint, likely degenerative. Physiologic activity is seen within the kidneys. Intense active healing of L1 inferior endplate fracture. Lesser activity is seen associated with L3 superior endplate deformity, suggesting subacute healing fracture. Mild lumbar spine degenerative change. Assessment   Impression: . Encounter Diagnoses   Name Primary?  Closed wedge compression fracture of L1 vertebra, sequela Yes    Lumbar spondylosis     DDD (degenerative disc disease), lumbar     Osteoporosis with current pathological fracture with delayed healing, unspecified osteoporosis type, subsequent encounter               Plan:       Continue LSO immobilization as per previous with ADLs  Active modification vertebral compression fracture protocol  Consider kyphoplasty at L1 as needed if symptoms show no appreciable improvement  Continue medical pain management as per previous Celebrex and as needed use of Vicodin minimize use of narcotics and caution relative to constipation  Repeat x-ray standing 2 view thoracolumbar on follow-up  Follow-up results of DEXA scan      Orders:      No orders of the defined types were placed in this encounter. Judy Hinton MD.      Disclaimer: \"This note was dictated with voice recognition software. Though review and correction are routine, we apologize for any errors. \"

## 2021-09-01 RX ORDER — CYCLOBENZAPRINE HCL 5 MG
TABLET ORAL
Qty: 90 TABLET | Refills: 0 | Status: SHIPPED | OUTPATIENT
Start: 2021-09-01 | End: 2021-11-09 | Stop reason: SDUPTHER

## 2021-09-03 ENCOUNTER — TELEPHONE (OUTPATIENT)
Dept: PSYCHIATRY | Age: 66
End: 2021-09-03

## 2021-09-23 ENCOUNTER — OFFICE VISIT (OUTPATIENT)
Dept: ORTHOPEDIC SURGERY | Age: 66
End: 2021-09-23
Payer: MEDICAID

## 2021-09-23 VITALS — HEIGHT: 58 IN | BODY MASS INDEX: 32.76 KG/M2 | WEIGHT: 156.09 LBS

## 2021-09-23 DIAGNOSIS — Z88.6 NSAID SENSITIVITY: ICD-10-CM

## 2021-09-23 DIAGNOSIS — E11.9 TYPE 2 DIABETES MELLITUS WITHOUT COMPLICATION, WITH LONG-TERM CURRENT USE OF INSULIN (HCC): ICD-10-CM

## 2021-09-23 DIAGNOSIS — S32.010S CLOSED WEDGE COMPRESSION FRACTURE OF L1 VERTEBRA, SEQUELA: ICD-10-CM

## 2021-09-23 DIAGNOSIS — M53.3 PAIN OF LEFT SACROILIAC JOINT: ICD-10-CM

## 2021-09-23 DIAGNOSIS — Z79.4 TYPE 2 DIABETES MELLITUS WITHOUT COMPLICATION, WITH LONG-TERM CURRENT USE OF INSULIN (HCC): ICD-10-CM

## 2021-09-23 DIAGNOSIS — M51.36 DDD (DEGENERATIVE DISC DISEASE), LUMBAR: ICD-10-CM

## 2021-09-23 DIAGNOSIS — M47.816 LUMBAR SPONDYLOSIS: ICD-10-CM

## 2021-09-23 DIAGNOSIS — M80.00XG OSTEOPOROSIS WITH CURRENT PATHOLOGICAL FRACTURE WITH DELAYED HEALING, UNSPECIFIED OSTEOPOROSIS TYPE, SUBSEQUENT ENCOUNTER: ICD-10-CM

## 2021-09-23 PROCEDURE — G8417 CALC BMI ABV UP PARAM F/U: HCPCS | Performed by: INTERNAL MEDICINE

## 2021-09-23 PROCEDURE — G8427 DOCREV CUR MEDS BY ELIG CLIN: HCPCS | Performed by: INTERNAL MEDICINE

## 2021-09-23 PROCEDURE — 3017F COLORECTAL CA SCREEN DOC REV: CPT | Performed by: INTERNAL MEDICINE

## 2021-09-23 PROCEDURE — 3044F HG A1C LEVEL LT 7.0%: CPT | Performed by: INTERNAL MEDICINE

## 2021-09-23 PROCEDURE — 99214 OFFICE O/P EST MOD 30 MIN: CPT | Performed by: INTERNAL MEDICINE

## 2021-09-23 PROCEDURE — 1036F TOBACCO NON-USER: CPT | Performed by: INTERNAL MEDICINE

## 2021-09-23 PROCEDURE — 2022F DILAT RTA XM EVC RTNOPTHY: CPT | Performed by: INTERNAL MEDICINE

## 2021-09-23 PROCEDURE — 20611 DRAIN/INJ JOINT/BURSA W/US: CPT | Performed by: INTERNAL MEDICINE

## 2021-09-23 RX ORDER — HYDROCODONE BITARTRATE AND ACETAMINOPHEN 5; 325 MG/1; MG/1
1 TABLET ORAL EVERY 6 HOURS PRN
Qty: 20 TABLET | Refills: 0 | Status: SHIPPED | OUTPATIENT
Start: 2021-09-23 | End: 2021-10-13 | Stop reason: SDUPTHER

## 2021-09-23 RX ORDER — BUPIVACAINE HYDROCHLORIDE 2.5 MG/ML
3 INJECTION, SOLUTION INFILTRATION; PERINEURAL ONCE
Status: COMPLETED | OUTPATIENT
Start: 2021-09-23 | End: 2021-09-23

## 2021-09-23 RX ORDER — CELECOXIB 200 MG/1
200 CAPSULE ORAL DAILY PRN
Qty: 30 CAPSULE | Refills: 1 | Status: SHIPPED | OUTPATIENT
Start: 2021-09-23 | End: 2021-09-29 | Stop reason: SDUPTHER

## 2021-09-23 RX ORDER — LIDOCAINE HYDROCHLORIDE 10 MG/ML
5 INJECTION, SOLUTION INFILTRATION; PERINEURAL ONCE
Status: COMPLETED | OUTPATIENT
Start: 2021-09-23 | End: 2021-09-23

## 2021-09-23 RX ADMIN — BUPIVACAINE HYDROCHLORIDE 7.5 MG: 2.5 INJECTION, SOLUTION INFILTRATION; PERINEURAL at 16:36

## 2021-09-23 RX ADMIN — LIDOCAINE HYDROCHLORIDE 5 ML: 10 INJECTION, SOLUTION INFILTRATION; PERINEURAL at 16:36

## 2021-09-23 NOTE — PROGRESS NOTES
Chief Complaint:   Chief Complaint   Patient presents with    Follow-up     COmpression fx L1, some improvement noted, but still 7/10 pain          History of Present Illness:       Patient is a 61 y.o. female returns follow up for the above complaint. The patient was last seen approximately 3 weeksago. The symptoms show no change since the last visit. The patient has had no further testing for the problem.       She is localizing pain to the left lumbosacral region and she is not localizing pain to the thoracolumbar region of the spine    She continues with LSO    She rates the pain is 8/10    Back pain to leg pain ratio 100:0    Sitting better tolerated than standing    She denies any new onset progressive weakness of lower extremities or new onset bowel or bladder dysfunction    DEXA scan pending    She continues on Celebrex and only as needed use of Norco without side effects     Past Medical History:        Past Medical History:   Diagnosis Date    Anxiety     Depression     Diabetes mellitus ([facility] [address]     Genital herpes     Hyperlipidemia     Hypertension     PTSD (post-traumatic stress disorder)     Vitamin D deficiency         Present Medications:         Current Outpatient Medications   Medication Sig Dispense Refill    cyclobenzaprine (FLEXERIL) 5 MG tablet TAKE 1 TABLET BY MOUTH NIGHTLY AS NEEDED FOR MUSCLE SPASM 90 tablet 0    celecoxib (CELEBREX) 200 MG capsule Take 1 capsule by mouth daily 30 capsule 1    acyclovir (ZOVIRAX) 200 MG capsule Take 2 capsules by mouth twice daily 120 capsule 1    Cholecalciferol (VITAMIN D3) 25 MCG (1000 UT) CAPS Take 2 capsules by mouth daily 180 capsule 1    fluticasone (FLONASE) 50 MCG/ACT nasal spray 1 spray by Each Nostril route daily 1 Bottle 2    amitriptyline (ELAVIL) 100 MG tablet Take 1 tablet by mouth nightly 90 tablet 1    sertraline (ZOLOFT) 100 MG tablet TAKE 1 TABLET BY MOUTH ONCE DAILY 90 tablet 1    pravastatin (PRAVACHOL) 40 MG tablet Take 1 tablet by mouth once daily 90 tablet 1    insulin glargine (LANTUS) 100 UNIT/ML injection vial Inject 50 Units into the skin every morning      brimonidine (ALPHAGAN) 0.2 % ophthalmic solution Place 1 drop into both eyes every 12 hours      prednisoLONE acetate (PRED FORTE) 1 % ophthalmic suspension Place 4 drops into both eyes 4 times daily      Calcium-Vitamins C & D (CALCIUM/C/D PO) Take by mouth      Multiple Vitamins-Minerals (MULTI-AFIA PO) Take by mouth      Glucosamine-MSM-Hyaluronic Acd (JOINT HEALTH PO) Take by mouth      aspirin 81 MG tablet Take 81 mg by mouth daily      insulin lispro (HUMALOG) 100 UNIT/ML injection vial Inject 15 Units into the skin 3 times daily (before meals) Indications: with sliding scale Sliding scal   <200 = 0 units   221-250 = 1 unit  251-300 = 2 units   301 - 350 = 3 units   351-400= 4 units   401-430 = 5 units       No current facility-administered medications for this visit. Allergies: Allergies   Allergen Reactions    Atorvastatin Swelling    Lisinopril Other (See Comments)    Naproxen Nausea Only    Oxycodone-Acetaminophen Other (See Comments)           Review of Systems:    Pertinent items are noted in HPI         Vital Signs: There were no vitals filed for this visit. General Exam:     Constitutional: Patient is adequately groomed with no evidence of malnutrition    Physical Exam: lower back      Primary Exam:    Inspection: No deformity atrophy appreciable curvature      Palpation: Focal tenderness over the left SI joint reproducing her pain no percussion tenderness over the thoracolumbar region      Range of Motion: 80/5 without pain      Strength: Normal lower extremity      Special Tests: Negative SLR      Skin: There are no rashes, ulcerations or lesions.       Gait: Nonantalgic     Neurovascular - non focal and intact       Additional Comments:        Additional Examinations:             Office Imaging Results/Procedures PerformedToday:            Office Procedures:     Orders Placed This Encounter   Procedures    XR THORACOLUMBAR SPINE (MIN 2 VIEWS)     Standing Status:   Future     Number of Occurrences:   1     Standing Expiration Date:   9/23/2022     Scheduling Instructions:      STANDING     Order Specific Question:   Reason for exam:     Answer:   f/u fx    US GUIDED NEEDLE PLACEMENT     Standing Status:   Future     Number of Occurrences:   1     Standing Expiration Date:   9/23/2022     Order Specific Question:   Reason for exam:     Answer:   marcaine block    ND ARTHROCENTESIS ASPIR&/INJ MAJOR JT/BURSA W/O US     Ultrasound-guided  LT   sacroiliac joint injection  Logiq E ultrasound 5 MHz    The patient was positioned prone on examination table with the abdomen supported with a pillow. Diagnostic ultrasound was performed to identify the inferior aspect of the sacroiliac joint. The curvilinear probe was utilized. After identifying the proper anatomic location a sterile prep was performed. Using longitudinal technique and medial to lateral approach 25-gauge needle was advanced subcutaneously and intramuscularly under direct guidance and approximately 5 cc of 1% lidocaine was injected. Needle was withdrawn a 22-gauge spinal needle was then advanced in the same needle tract and the needle  was advanced into the sacroiliac joint adjusting the position of the needle tip as needed to ensure intra-articular entry. Loss of resistance was appreciated and the injection was completed with 3 cc of of 0.25% Marcaine. Patient tolerated this with minimal discomfort. Positive anesthetic response    Images stored. Band-Aid to seal the puncture wound    Technically successful injection    Other Outside Imaging and Testing Personally Reviewed:    XR THORACOLUMBAR SPINE (MIN 2 VIEWS)    Result Date: 9/23/2021  Radiology exam is complete. No Radiologist dictation. Please follow up with ordering provider.         Standing lateral x-ray-thoracolumbar single view    There is inferior endplate irregularity at the L1 vertebral body with discontinuity of the anterior and inferior cortex of the vertebral body suggestive of fracture. As described in the CT scan L1 inferior endplate fracture demonstrates 50% loss of vertebral body height. X-rays are stable when compared to x-rays taken August 17, 2021. Assessment   Impression: . Encounter Diagnoses   Name Primary?  Pain of left sacroiliac joint     Closed wedge compression fracture of L1 vertebra, sequela     Osteoporosis with current pathological fracture with delayed healing, unspecified osteoporosis type, subsequent encounter     DDD (degenerative disc disease), lumbar     Lumbar spondylosis     Type 2 diabetes mellitus without complication, with long-term current use of insulin ([facility])     NSAID sensitivity               Plan:     Monitor response to left SI joint Marcaine block for therapeutic benefit  Consider ultrasound-guided intra-articular steroid injection of the left SI joint as needed  Continue Celebrex daily as needed with GI precaution and only as needed use of Norco-minimize use of narcotics which she is doing  Activity modification vertebral compression fracture protocol she can start to wean from LSO initially within the home and gradually with outdoor activities  Continue maintenance HEP as tolerated  Proceed with DEXA scan and anticipate referral to endocrinology.       Overall do not believe proceeding that more aggressive intervention for management of the L1 insufficiency fracture is necessary at this time proceed as outlined above           Orders:        Orders Placed This Encounter   Procedures    XR THORACOLUMBAR SPINE (MIN 2 VIEWS)     Standing Status:   Future     Number of Occurrences:   1     Standing Expiration Date:   9/23/2022     Scheduling Instructions:      STANDING     Order Specific Question:   Reason for exam:     Answer:   f/u fx  US GUIDED NEEDLE PLACEMENT     Standing Status:   Future     Number of Occurrences:   1     Standing Expiration Date:   9/23/2022     Order Specific Question:   Reason for exam:     Answer:   marcaine block    AR ARTHROCENTESIS ASPIR&/INJ MAJOR JT/BURSA W/O US         Niurka Kimbrough MD.      Disclaimer: \"This note was dictated with voice recognition software. Though review and correction are routine, we apologize for any errors. \"

## 2021-09-29 ENCOUNTER — TELEPHONE (OUTPATIENT)
Dept: ORTHOPEDIC SURGERY | Age: 66
End: 2021-09-29

## 2021-09-29 DIAGNOSIS — S32.010S CLOSED WEDGE COMPRESSION FRACTURE OF L1 VERTEBRA, SEQUELA: ICD-10-CM

## 2021-09-29 DIAGNOSIS — M53.3 PAIN OF LEFT SACROILIAC JOINT: ICD-10-CM

## 2021-09-29 DIAGNOSIS — M51.36 DDD (DEGENERATIVE DISC DISEASE), LUMBAR: ICD-10-CM

## 2021-09-29 DIAGNOSIS — M47.816 LUMBAR SPONDYLOSIS: ICD-10-CM

## 2021-09-29 DIAGNOSIS — M80.00XG OSTEOPOROSIS WITH CURRENT PATHOLOGICAL FRACTURE WITH DELAYED HEALING, UNSPECIFIED OSTEOPOROSIS TYPE, SUBSEQUENT ENCOUNTER: ICD-10-CM

## 2021-09-29 RX ORDER — CELECOXIB 200 MG/1
200 CAPSULE ORAL DAILY PRN
Qty: 30 CAPSULE | Refills: 1 | Status: SHIPPED | OUTPATIENT
Start: 2021-09-29 | End: 2021-11-30 | Stop reason: SDUPTHER

## 2021-09-29 NOTE — TELEPHONE ENCOUNTER
Spoke to patient, she states she is feeling better. She will give it some more time to see if better, if not will call office to schedule a sooner F/U.

## 2021-09-29 NOTE — TELEPHONE ENCOUNTER
General Question     Subject: BACK PAIN  Patient and /or Facility Request: PATIENT STATES SHE'S HAD NO PAIN RELIEF SINCE HER INJECTION. HER RIGHT SIDE HAS BEEN BOTHERING HER. SHOULD SHE MOVE HER APPT UP?   Contact Number: [phone]

## 2021-10-04 LAB

## 2021-10-13 DIAGNOSIS — S32.010S CLOSED WEDGE COMPRESSION FRACTURE OF L1 VERTEBRA, SEQUELA: ICD-10-CM

## 2021-10-13 DIAGNOSIS — M47.816 LUMBAR SPONDYLOSIS: ICD-10-CM

## 2021-10-13 DIAGNOSIS — M80.00XG OSTEOPOROSIS WITH CURRENT PATHOLOGICAL FRACTURE WITH DELAYED HEALING, UNSPECIFIED OSTEOPOROSIS TYPE, SUBSEQUENT ENCOUNTER: ICD-10-CM

## 2021-10-13 DIAGNOSIS — M51.36 DDD (DEGENERATIVE DISC DISEASE), LUMBAR: ICD-10-CM

## 2021-10-13 DIAGNOSIS — M53.3 PAIN OF LEFT SACROILIAC JOINT: ICD-10-CM

## 2021-10-13 RX ORDER — HYDROCODONE BITARTRATE AND ACETAMINOPHEN 5; 325 MG/1; MG/1
1 TABLET ORAL EVERY 6 HOURS PRN
Qty: 20 TABLET | Refills: 0 | Status: SHIPPED | OUTPATIENT
Start: 2021-10-13 | End: 2021-10-20

## 2021-10-27 ENCOUNTER — VIRTUAL VISIT (OUTPATIENT)
Dept: PSYCHOLOGY | Age: 66
End: 2021-10-27
Payer: MEDICAID

## 2021-10-27 ENCOUNTER — OFFICE VISIT (OUTPATIENT)
Dept: ORTHOPEDIC SURGERY | Age: 66
End: 2021-10-27
Payer: MEDICAID

## 2021-10-27 VITALS — BODY MASS INDEX: 32.75 KG/M2 | HEIGHT: 58 IN | WEIGHT: 156 LBS

## 2021-10-27 DIAGNOSIS — S32.010S CLOSED WEDGE COMPRESSION FRACTURE OF L1 VERTEBRA, SEQUELA: Primary | ICD-10-CM

## 2021-10-27 DIAGNOSIS — M54.50 LUMBAR PAIN: ICD-10-CM

## 2021-10-27 DIAGNOSIS — M48.061 DEGENERATIVE LUMBAR SPINAL STENOSIS: ICD-10-CM

## 2021-10-27 DIAGNOSIS — F43.10 PTSD (POST-TRAUMATIC STRESS DISORDER): Primary | ICD-10-CM

## 2021-10-27 DIAGNOSIS — M80.00XG OSTEOPOROSIS WITH CURRENT PATHOLOGICAL FRACTURE WITH DELAYED HEALING, UNSPECIFIED OSTEOPOROSIS TYPE, SUBSEQUENT ENCOUNTER: ICD-10-CM

## 2021-10-27 DIAGNOSIS — M51.36 DDD (DEGENERATIVE DISC DISEASE), LUMBAR: ICD-10-CM

## 2021-10-27 DIAGNOSIS — M47.816 LUMBAR SPONDYLOSIS: ICD-10-CM

## 2021-10-27 PROCEDURE — G8484 FLU IMMUNIZE NO ADMIN: HCPCS | Performed by: INTERNAL MEDICINE

## 2021-10-27 PROCEDURE — 90832 PSYTX W PT 30 MINUTES: CPT | Performed by: PSYCHOLOGIST

## 2021-10-27 PROCEDURE — G8427 DOCREV CUR MEDS BY ELIG CLIN: HCPCS | Performed by: INTERNAL MEDICINE

## 2021-10-27 PROCEDURE — 3017F COLORECTAL CA SCREEN DOC REV: CPT | Performed by: INTERNAL MEDICINE

## 2021-10-27 PROCEDURE — G8417 CALC BMI ABV UP PARAM F/U: HCPCS | Performed by: INTERNAL MEDICINE

## 2021-10-27 PROCEDURE — 1036F TOBACCO NON-USER: CPT | Performed by: INTERNAL MEDICINE

## 2021-10-27 PROCEDURE — 99214 OFFICE O/P EST MOD 30 MIN: CPT | Performed by: INTERNAL MEDICINE

## 2021-10-27 NOTE — LETTER
[address]  [address] [facility] [address]  Phone: [phone]  Fax: [phone]    Aliya Ansari MD    October 29, 2021     Christiano Singleton, [facility] - [place] [address]    Patient: Juan Tineo   MR Number: [id number]   YOB: 1955   Date of Visit: 10/27/2021       Dear Christiano Singleton: Thank you for referring Jolanta Sabillon to me for evaluation/treatment. Below are the relevant portions of my assessment and plan of care. Impression: . Encounter Diagnoses   Name Primary?  Closed wedge compression fracture of L1 vertebra, sequela Yes    Osteoporosis with current pathological fracture with delayed healing, unspecified osteoporosis type, subsequent encounter     Degenerative lumbar spinal stenosis     DDD (degenerative disc disease), lumbar     Lumbar spondylosis     Lumbar pain               Plan:     Recommend LEDI L4/L5 translaminar  Activity modification vertebral compression factor  Medical pain management as per previous minimize use of narcotics  Continue IHEP as per previous  Complete DEXA scan as previously recommended  Consider repeat Marcaine block SI joint injection as needed and advanced imaging of the SI joints as needed      Overall I believe the L1 vertebral compression fracture is clinically healed. Her lower back pain more than likely relates to the spinal stenosis at L4/L5 though her symptoms do not follow a typical neurogenic claudication pattern. Proceed as outlined above. Orders:      No orders of the defined types were placed in this encounter. Essie Mortimer, MD.      Disclaimer: \"This note was dictated with voice recognition software. Though review and correction are routine, we apologize for any errors. \"       If you have questions, please do not hesitate to call me.  I look forward to following Fawn Felix along with you.    Sincerely,      Marisela Mathew MD
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
## 2021-10-27 NOTE — PROGRESS NOTES
Chief Complaint:   Chief Complaint   Patient presents with    Lower Back Pain     f/u pain is good right now on meds. States getting stabbing/stinging pain          History of Present Illness:       Patient is a 61 y.o. female returns follow up for the above complaint. The patient was last seen approximately 1 month sago. The symptoms show no change since the last visit. The patient has had no further testing for the problem. Status post SI joint Marcaine block 9/23/2021    Low back pain remains problematic pain levels 1-10/10    Pain localizes lumbosacral bandlike distribution left greater than right aching burning stabbing    She denies any lower limb pain she denies any new onset or progressive weakness of the lower extremities she denies any new onset bowel or bladder dysfunction. Work-up has included MRI evaluation of the lumbar spine which was reviewed below    She does not localize pain to the thoracolumbar or upper bar region. She has transition from LSO without consequence    DEXA scan pending she continues on Celebrex without side effects only sparing use of narcotic analgesia         Past Medical History:        Past Medical History:   Diagnosis Date    Anxiety     Depression     Diabetes mellitus ([address]     Genital herpes     Hyperlipidemia     Hypertension     PTSD (post-traumatic stress disorder)     Vitamin D deficiency         Present Medications:         Current Outpatient Medications   Medication Sig Dispense Refill    HYDROcodone-acetaminophen (NORCO) 5-325 MG per tablet Take 0.5-1 tablets by mouth every 6 hours as needed for Pain for up to 7 days.  Take lowest dose possible to manage pain 20 tablet 0    celecoxib (CELEBREX) 200 MG capsule Take 1 capsule by mouth daily as needed for Pain 30 capsule 1    cyclobenzaprine (FLEXERIL) 5 MG tablet TAKE 1 TABLET BY MOUTH NIGHTLY AS NEEDED FOR MUSCLE SPASM 90 tablet 0    acyclovir (ZOVIRAX) 200 MG capsule Take 2 capsules by mouth twice daily 120 capsule 1    Cholecalciferol (VITAMIN D3) 25 MCG (1000 UT) CAPS Take 2 capsules by mouth daily 180 capsule 1    fluticasone (FLONASE) 50 MCG/ACT nasal spray 1 spray by Each Nostril route daily 1 Bottle 2    amitriptyline (ELAVIL) 100 MG tablet Take 1 tablet by mouth nightly 90 tablet 1    sertraline (ZOLOFT) 100 MG tablet TAKE 1 TABLET BY MOUTH ONCE DAILY 90 tablet 1    pravastatin (PRAVACHOL) 40 MG tablet Take 1 tablet by mouth once daily 90 tablet 1    insulin glargine (LANTUS) 100 UNIT/ML injection vial Inject 50 Units into the skin every morning      brimonidine (ALPHAGAN) 0.2 % ophthalmic solution Place 1 drop into both eyes every 12 hours      prednisoLONE acetate (PRED FORTE) 1 % ophthalmic suspension Place 4 drops into both eyes 4 times daily      Calcium-Vitamins C & D (CALCIUM/C/D PO) Take by mouth      Multiple Vitamins-Minerals (MULTI-AFIA PO) Take by mouth      Glucosamine-MSM-Hyaluronic Acd (JOINT HEALTH PO) Take by mouth      aspirin 81 MG tablet Take 81 mg by mouth daily      insulin lispro (HUMALOG) 100 UNIT/ML injection vial Inject 15 Units into the skin 3 times daily (before meals) Indications: with sliding scale Sliding scal   <200 = 0 units   221-250 = 1 unit  251-300 = 2 units   301 - 350 = 3 units   351-400= 4 units   401-430 = 5 units       No current facility-administered medications for this visit. Allergies: Allergies   Allergen Reactions    Atorvastatin Swelling    Lisinopril Other (See Comments)    Naproxen Nausea Only    Oxycodone-Acetaminophen Other (See Comments)           Review of Systems:    Pertinent items are noted in HPI        Vital Signs: There were no vitals filed for this visit.      General Exam:     Constitutional: Patient is adequately groomed with no evidence of malnutrition    Physical Exam: lower back      Primary Exam:    Inspection:  No deformity atrophy or curvature      Palpation: SI tender bilaterally      Range of Motion:  60/7 min discomfort      Strength:  Normal lower extremity      Special Tests:  SLR negative      Skin: There are no rashes, ulcerations or lesions. Gait: non antalgic      Neurovascular - non focal and intact     Additional Comments:        Additional Examinations:                 Office Imaging Results/Procedures PerformedToday:             Office Procedures:   No orders of the defined types were placed in this encounter. Other Outside Imaging and Testing Personally Reviewed:        [facility], [facility]           Patient Name: Julianna Barajas   Case ID: [id number]   Patient : 1955   Referring Physician: Catrina Reynaga MD   Exam Date: 2021   Exam Description: MR Lumbar Spine w/o Contrast            HISTORY:  Lumbar pain, lumbar spondylosis.  Evaluate L1 vertebral body.  Evaluate for    osteomyelitis versus discitis. Patient fell back in shower on May 12, 2021, has ongoing low    back pain.  Going to physical therapy for last 3 months without results.       TECHNICAL FACTORS:  Long- and short-axis fat- and water-weighted images were performed.       COMPARISON:  None.       FINDINGS:  Thoracolumbar junction is intact and lumbar spine alignment is anatomic.  Anterior    and middle columns are intact as well as the anterior and posterior longitudinal ligaments.  No    focal ligamentous disruption or epidural fluid collection is appreciated.       Vertebral marrow signal appears normal.       Conus medullaris is visualized at L1-L2 and visualized spinal cord and cauda equina nerve roots    appear normal.  No evidence of an intradural or extradural mass is present.       T12-L1: Sterile osteochondrosis.       L1-L2: Subacute complex fracture of the L1 vertebral body with approximately 20% vertebral    height loss along the anterior-inferior aspect of the endplate. Sterile osteochondrosis.  Disc    desiccation.  Moderate intervertebral disc space height loss.  Left foraminal protrusion-type    herniation combined with facet arthropathy contributes to moderate left foraminal narrowing    with moderate impingement on the exiting L1 nerve root sleeve.       L2-L3: Disc desiccation.  Bilobed foraminal protrusions left greater than right combined with    facet arthropathy produce moderate foraminal narrowing bilaterally and contact of the exiting    L2 nerve root sleeves.  No canal stenosis.       L3-L4: Chronic compression deformity along the superior endplate of the L3 vertebral body with    approximately 25% loss of vertebral height.  Sterile osteochondrosis. Disc desiccation.     Moderate intervertebral disc space height loss.  Bilobed foraminal protrusions combined with    facet arthropathy produces moderate right foraminal narrowing with impingement on the exiting    right L3 nerve root sleeve.  Mild left foraminal narrowing. Thickening of the ligamentum flavum    contributes to moderate canal stenosis.       L4-L5: Sterile osteochondrosis.  Disc desiccation.  Broad-based disc protrusion.  No canal    stenosis.  Thickening of the ligamentum flavum with moderate facet arthropathy contribute to    produce moderate canal stenosis and moderate foraminal narrowing. L5-S1: Disc desiccation.  Small central disc protrusion.  No canal stenosis.  Moderate facet    arthropathy and thickening of the ligamentum flavum.  Bilateral facet capsulitis.  Mild    bilateral foraminal narrowing.       Prevertebral and paraspinal soft tissues demonstrate moderate sarcopenia.       Edema within the posterior soft tissue may reflect a contusion.       Visualized soft tissues of the abdomen and pelvis are normal.       No aortic aneurysm or atherosclerosis of the aorta noted.       No signs of retroaortic renal vein or other anomaly.       No renal or adrenal masses are identified.       No incidental pelvic masses present.       CONCLUSION:   1.  No evidence of osteomyelitis or discitis. 2. Subacute complex fracture along the inferior endplate of L1.  No evidence of neoplastic    infiltration. 3. At L1-L2, moderate impingement on the exiting left L1 nerve root sleeve secondary to a left    foraminal protrusion-type herniation in conjunction with facet arthropathy. 4. At L3-L4, moderate canal stenosis and impingement on the exiting right L3 nerve root sleeve    secondary to bilobed foraminal protrusions in conjunction with facet arthropathy.  Chronic    compression deformity along the L3 vertebral body.  Consider DEXA scan for further evaluation.       Thank you for the opportunity to provide your interpretation.               Aaron Fontaine. Samreen Molina MD       A: IAN/rosa elena 08/13/2021 9:07 AM   T: ROSA ELENA 08/12/2021 5:17 PM                   Assessment   Impression: . Encounter Diagnoses   Name Primary?  Closed wedge compression fracture of L1 vertebra, sequela Yes    Osteoporosis with current pathological fracture with delayed healing, unspecified osteoporosis type, subsequent encounter     Degenerative lumbar spinal stenosis     DDD (degenerative disc disease), lumbar     Lumbar spondylosis     Lumbar pain               Plan:     Recommend LEDI L4/L5 translaminar  Activity modification vertebral compression factor  Medical pain management as per previous minimize use of narcotics  Continue IHEP as per previous  Complete DEXA scan as previously recommended  Consider repeat Marcaine block SI joint injection as needed and advanced imaging of the SI joints as needed      Overall I believe the L1 vertebral compression fracture is clinically healed. Her lower back pain more than likely relates to the spinal stenosis at L4/L5 though her symptoms do not follow a typical neurogenic claudication pattern. Proceed as outlined above. Orders:      No orders of the defined types were placed in this encounter. Barbie Landry MD.      Disclaimer:     \"This note was dictated

## 2021-10-28 ENCOUNTER — TELEPHONE (OUTPATIENT)
Dept: ORTHOPEDIC SURGERY | Age: 66
End: 2021-10-28

## 2021-10-28 DIAGNOSIS — M48.061 DEGENERATIVE LUMBAR SPINAL STENOSIS: ICD-10-CM

## 2021-10-28 DIAGNOSIS — Z88.6 NSAID SENSITIVITY: ICD-10-CM

## 2021-10-28 DIAGNOSIS — S32.010S CLOSED WEDGE COMPRESSION FRACTURE OF L1 VERTEBRA, SEQUELA: Primary | ICD-10-CM

## 2021-10-28 DIAGNOSIS — M51.36 DDD (DEGENERATIVE DISC DISEASE), LUMBAR: ICD-10-CM

## 2021-10-28 DIAGNOSIS — M47.816 LUMBAR SPONDYLOSIS: ICD-10-CM

## 2021-10-28 DIAGNOSIS — M80.00XG OSTEOPOROSIS WITH CURRENT PATHOLOGICAL FRACTURE WITH DELAYED HEALING, UNSPECIFIED OSTEOPOROSIS TYPE, SUBSEQUENT ENCOUNTER: ICD-10-CM

## 2021-10-28 RX ORDER — HYDROCODONE BITARTRATE AND ACETAMINOPHEN 5; 325 MG/1; MG/1
.5-1 TABLET ORAL EVERY 6 HOURS PRN
Qty: 20 TABLET | Refills: 0 | Status: SHIPPED | OUTPATIENT
Start: 2021-10-28 | End: 2021-11-04

## 2021-11-09 ENCOUNTER — OFFICE VISIT (OUTPATIENT)
Dept: INTERNAL MEDICINE CLINIC | Age: 66
End: 2021-11-09
Payer: MEDICAID

## 2021-11-09 VITALS
DIASTOLIC BLOOD PRESSURE: 70 MMHG | OXYGEN SATURATION: 98 % | BODY MASS INDEX: 31.45 KG/M2 | HEIGHT: 59 IN | HEART RATE: 92 BPM | WEIGHT: 156 LBS | SYSTOLIC BLOOD PRESSURE: 138 MMHG

## 2021-11-09 DIAGNOSIS — E10.9 TYPE 1 DIABETES MELLITUS WITHOUT COMPLICATION (HCC): ICD-10-CM

## 2021-11-09 DIAGNOSIS — F43.10 PTSD (POST-TRAUMATIC STRESS DISORDER): ICD-10-CM

## 2021-11-09 DIAGNOSIS — J30.89 SEASONAL ALLERGIC RHINITIS DUE TO OTHER ALLERGIC TRIGGER: ICD-10-CM

## 2021-11-09 DIAGNOSIS — M25.511 CHRONIC RIGHT SHOULDER PAIN: ICD-10-CM

## 2021-11-09 DIAGNOSIS — F41.9 ANXIETY AND DEPRESSION: ICD-10-CM

## 2021-11-09 DIAGNOSIS — F32.A ANXIETY AND DEPRESSION: ICD-10-CM

## 2021-11-09 DIAGNOSIS — M54.50 LUMBAR PAIN: ICD-10-CM

## 2021-11-09 DIAGNOSIS — Z23 NEED FOR TDAP VACCINATION: ICD-10-CM

## 2021-11-09 DIAGNOSIS — Z12.11 SCREEN FOR COLON CANCER: ICD-10-CM

## 2021-11-09 DIAGNOSIS — A60.00 GENITAL HERPES SIMPLEX, UNSPECIFIED SITE: ICD-10-CM

## 2021-11-09 DIAGNOSIS — Z00.00 ANNUAL PHYSICAL EXAM: Primary | ICD-10-CM

## 2021-11-09 DIAGNOSIS — Z23 NEED FOR INFLUENZA VACCINATION: ICD-10-CM

## 2021-11-09 DIAGNOSIS — I10 ESSENTIAL HYPERTENSION: ICD-10-CM

## 2021-11-09 DIAGNOSIS — E78.2 MIXED HYPERLIPIDEMIA: ICD-10-CM

## 2021-11-09 DIAGNOSIS — G89.29 CHRONIC RIGHT SHOULDER PAIN: ICD-10-CM

## 2021-11-09 DIAGNOSIS — E55.9 VITAMIN D DEFICIENCY: ICD-10-CM

## 2021-11-09 LAB
CREATININE URINE: 26.6 MG/DL (ref 28–259)
MICROALBUMIN UR-MCNC: <1.2 MG/DL
MICROALBUMIN/CREAT UR-RTO: ABNORMAL MG/G (ref 0–30)

## 2021-11-09 PROCEDURE — 90674 CCIIV4 VAC NO PRSV 0.5 ML IM: CPT | Performed by: NURSE PRACTITIONER

## 2021-11-09 PROCEDURE — G8482 FLU IMMUNIZE ORDER/ADMIN: HCPCS | Performed by: NURSE PRACTITIONER

## 2021-11-09 PROCEDURE — 90715 TDAP VACCINE 7 YRS/> IM: CPT | Performed by: NURSE PRACTITIONER

## 2021-11-09 PROCEDURE — 90471 IMMUNIZATION ADMIN: CPT | Performed by: NURSE PRACTITIONER

## 2021-11-09 PROCEDURE — 90472 IMMUNIZATION ADMIN EACH ADD: CPT | Performed by: NURSE PRACTITIONER

## 2021-11-09 PROCEDURE — 99396 PREV VISIT EST AGE 40-64: CPT | Performed by: NURSE PRACTITIONER

## 2021-11-09 RX ORDER — PRAVASTATIN SODIUM 40 MG
TABLET ORAL
Qty: 90 TABLET | Refills: 1 | Status: SHIPPED | OUTPATIENT
Start: 2021-11-09 | End: 2022-05-10 | Stop reason: SDUPTHER

## 2021-11-09 RX ORDER — AMITRIPTYLINE HYDROCHLORIDE 100 MG/1
100 TABLET, FILM COATED ORAL NIGHTLY
Qty: 90 TABLET | Refills: 1 | Status: SHIPPED | OUTPATIENT
Start: 2021-11-09 | End: 2022-05-10 | Stop reason: SDUPTHER

## 2021-11-09 RX ORDER — CYCLOBENZAPRINE HCL 5 MG
TABLET ORAL
Qty: 90 TABLET | Refills: 0 | Status: SHIPPED | OUTPATIENT
Start: 2021-11-09 | End: 2022-02-04 | Stop reason: SDUPTHER

## 2021-11-09 RX ORDER — SERTRALINE HYDROCHLORIDE 100 MG/1
TABLET, FILM COATED ORAL
Qty: 90 TABLET | Refills: 1 | Status: SHIPPED | OUTPATIENT
Start: 2021-11-09 | End: 2022-05-10 | Stop reason: SDUPTHER

## 2021-11-09 RX ORDER — FLUTICASONE PROPIONATE 50 MCG
1 SPRAY, SUSPENSION (ML) NASAL DAILY
Qty: 1 EACH | Refills: 2 | Status: SHIPPED | OUTPATIENT
Start: 2021-11-09 | End: 2022-05-10 | Stop reason: SDUPTHER

## 2021-11-09 RX ORDER — ACYCLOVIR 200 MG/1
CAPSULE ORAL
Qty: 120 CAPSULE | Refills: 1 | Status: SHIPPED | OUTPATIENT
Start: 2021-11-09 | End: 2022-05-10 | Stop reason: SDUPTHER

## 2021-11-09 SDOH — ECONOMIC STABILITY: FOOD INSECURITY: WITHIN THE PAST 12 MONTHS, THE FOOD YOU BOUGHT JUST DIDN'T LAST AND YOU DIDN'T HAVE MONEY TO GET MORE.: NEVER TRUE

## 2021-11-09 SDOH — ECONOMIC STABILITY: FOOD INSECURITY: WITHIN THE PAST 12 MONTHS, YOU WORRIED THAT YOUR FOOD WOULD RUN OUT BEFORE YOU GOT MONEY TO BUY MORE.: NEVER TRUE

## 2021-11-09 ASSESSMENT — PATIENT HEALTH QUESTIONNAIRE - PHQ9
SUM OF ALL RESPONSES TO PHQ QUESTIONS 1-9: 0
1. LITTLE INTEREST OR PLEASURE IN DOING THINGS: 0
SUM OF ALL RESPONSES TO PHQ9 QUESTIONS 1 & 2: 0
2. FEELING DOWN, DEPRESSED OR HOPELESS: 0

## 2021-11-09 ASSESSMENT — ENCOUNTER SYMPTOMS
SORE THROAT: 0
DIARRHEA: 0
VOMITING: 0
SHORTNESS OF BREATH: 0
SINUS PAIN: 0
ABDOMINAL PAIN: 0
COUGH: 0
BACK PAIN: 1
WHEEZING: 0
NAUSEA: 0
CONSTIPATION: 0

## 2021-11-09 ASSESSMENT — SOCIAL DETERMINANTS OF HEALTH (SDOH): HOW HARD IS IT FOR YOU TO PAY FOR THE VERY BASICS LIKE FOOD, HOUSING, MEDICAL CARE, AND HEATING?: NOT HARD AT ALL

## 2021-11-09 NOTE — PROGRESS NOTES
Annual Exam Office Visit   11/9/2021    Subjective:  Chief Complaint   Patient presents with    Annual Exam     no concerns     HPI:   Quinn Ramirez is a 61 y.o. female who presents to the clinic today for an annual exam.    T1DM- She follows with endocrine - Dr. Dianne Gaspar. Walking for exercise. Walking steps. Diet is reported as \"pretty good. \"     HTN- using lifestyle modifications. Not monitoring BP at home. Asymptomatic. No chest pain, palpitations, shortness of breath, trouble breathing, lightheadedness, dizziness or blurred vision.     Depression/PTSD/Anxiety-Pt is taking Zoloft 100 mg daily and amitriptyline 100 mg nightly. States her mood is doing well. Denies SI/HI. Not interested in changing medication regimen- states she has taken this for years without side effects. Seeing psychology.      HLD- Allergy to atorvastatin. Taking pravastatin 40 mg without concerns/without side effects.     Genital herpes- Pt reports that she takes acyclovir 400 mg once nightly- prescribed for BID. She states she increases the dose with a flare- no recent flare.     Vit D deficiency- taking supplements daily. Asymptomatic.      Allergic rhinitis- well controlled with flonase. No side effects.     Low back pains- seeing ortho. Performed PT. Getting epidurals. Having DEXA scan.      Right shoulder pains- present for years. Takes flexeril PRN-typically once nightly. States she is not interested in cutting down- has been on this regimen for years. Review of Systems   Constitutional: Negative for chills, fatigue and fever. HENT: Negative for congestion, postnasal drip, sinus pain and sore throat. Respiratory: Negative for cough, shortness of breath and wheezing. Cardiovascular: Negative for chest pain, palpitations and leg swelling. Gastrointestinal: Negative for abdominal pain, constipation, diarrhea, nausea and vomiting. Genitourinary: Negative for dysuria, frequency, hematuria and urgency. Musculoskeletal: Positive for back pain. Right shoulder pains   Skin: Negative for pallor and rash. Neurological: Negative for dizziness, weakness, light-headedness, numbness and headaches. Psychiatric/Behavioral: Negative for dysphoric mood, sleep disturbance and suicidal ideas. The patient is not nervous/anxious.       Allergies   Allergen Reactions    Atorvastatin Swelling    Lisinopril Other (See Comments)    Naproxen Nausea Only    Oxycodone-Acetaminophen Other (See Comments)     Family History   Problem Relation Age of Onset    Cirrhosis Mother         of liver    Lung Cancer Paternal Grandfather     Other Father         blood clot- pt unsure of where clot was    Breast Cancer Neg Hx     Ovarian Cancer Neg Hx     Heart Attack Neg Hx     Stroke Neg Hx      Current Outpatient Rx   Medication Sig Dispense Refill    cyclobenzaprine (FLEXERIL) 5 MG tablet TAKE 1 TABLET BY MOUTH NIGHTLY AS NEEDED FOR MUSCLE SPASM 90 tablet 0    pravastatin (PRAVACHOL) 40 MG tablet Take 1 tablet by mouth once daily 90 tablet 1    sertraline (ZOLOFT) 100 MG tablet TAKE 1 TABLET BY MOUTH ONCE DAILY 90 tablet 1    amitriptyline (ELAVIL) 100 MG tablet Take 1 tablet by mouth nightly 90 tablet 1    fluticasone (FLONASE) 50 MCG/ACT nasal spray 1 spray by Each Nostril route daily 1 each 2    acyclovir (ZOVIRAX) 200 MG capsule Take 2 capsules by mouth twice daily 120 capsule 1    celecoxib (CELEBREX) 200 MG capsule Take 1 capsule by mouth daily as needed for Pain 30 capsule 1    Cholecalciferol (VITAMIN D3) 25 MCG (1000 UT) CAPS Take 2 capsules by mouth daily 180 capsule 1    insulin glargine (LANTUS) 100 UNIT/ML injection vial Inject 50 Units into the skin every morning      Calcium-Vitamins C & D (CALCIUM/C/D PO) Take by mouth      Multiple Vitamins-Minerals (MULTI-AFIA PO) Take by mouth      Glucosamine-MSM-Hyaluronic Acd (JOINT HEALTH PO) Take by mouth      aspirin 81 MG tablet Take 81 mg by mouth daily  insulin lispro (HUMALOG) 100 UNIT/ML injection vial Inject 15 Units into the skin 3 times daily (before meals) Indications: with sliding scale Sliding scal   <200 = 0 units   221-250 = 1 unit  251-300 = 2 units   301 - 350 = 3 units   351-400= 4 units   401-430 = 5 units       Social History     Socioeconomic History    Marital status: Legally      Spouse name: Not on file    Number of children: Not on file    Years of education: Not on file    Highest education level: Not on file   Occupational History    Not on file   Tobacco Use    Smoking status: Never Smoker    Smokeless tobacco: Never Used   Vaping Use    Vaping Use: Never used   Substance and Sexual Activity    Alcohol use: Not Currently    Drug use: Never    Sexual activity: Not Currently   Other Topics Concern    Not on file   Social History Narrative    Moved from [address] [place] [address] [address] to [place]. Has a daughter, son-in-law and grandchildren live with her. Social Determinants of Health     Financial Resource Strain: Low Risk     Difficulty of Paying Living Expenses: Not hard at all   Food Insecurity: No Food Insecurity    Worried About Running Out of Food in the Last Year: Never true    Nico of Food in the Last Year: Never true   Transportation Needs:     Lack of Transportation (Medical): Not on file    Lack of Transportation (Non-Medical):  Not on file   Physical Activity:     Days of Exercise per Week: Not on file    Minutes of Exercise per Session: Not on file   Stress:     Feeling of Stress : Not on file   Social Connections:     Frequency of Communication with Friends and Family: Not on file    Frequency of Social Gatherings with Friends and Family: Not on file    Attends Synagogue Services: Not on file    Active Member of Clubs or Organizations: Not on file    Attends Club or Organization Meetings: Not on file    Marital Status: Not on file   Intimate Partner Violence:     Fear of Current or Ex-Partner: Not on file  Emotionally Abused: Not on file    Physically Abused: Not on file    Sexually Abused: Not on file   Housing Stability:     Unable to Pay for Housing in the Last Year: Not on file    Number of Places Lived in the Last Year: Not on file    Unstable Housing in the Last Year: Not on file     Past Medical History:   Diagnosis Date    Anxiety     Depression     Diabetes mellitus ([facility]     Genital herpes     Hyperlipidemia     Hypertension     PTSD (post-traumatic stress disorder)     Vitamin D deficiency      Patient Active Problem List   Diagnosis    Anxiety and depression    PTSD (post-traumatic stress disorder)    Type 2 diabetes mellitus without complication, with long-term current use of insulin ([facility]    Essential hypertension    Genital herpes simplex    Hyperlipidemia      Wt Readings from Last 3 Encounters:   11/09/21 156 lb (70.8 kg)   10/27/21 156 lb (70.8 kg)   09/23/21 156 lb 1.4 oz (70.8 kg)     BP Readings from Last 3 Encounters:   11/09/21 138/70   05/07/21 110/60   05/04/21 120/68     The 10-year ASCVD risk score (Tyson Cabrera et al., 2013) is: 11%    Values used to calculate the score:      Age: 61 years      Sex: Female      Is Non- : No      Diabetic: Yes      Tobacco smoker: No      Systolic Blood Pressure: 899 mmHg      Is BP treated: No      HDL Cholesterol: 47 mg/dL      Total Cholesterol: 179 mg/dL    PHQ-9 Total Score: 0 (11/9/2021  3:50 PM)    Objective/Physical Exam:  /70   Pulse 92   Ht 4' 11\" (1.499 m)   Wt 156 lb (70.8 kg)   SpO2 98%   BMI 31.51 kg/m²   Body mass index is 31.51 kg/m². Physical Exam  Vitals reviewed. Constitutional:       General: She is not in acute distress. Appearance: She is well-developed. She is not diaphoretic. HENT:      Head: Normocephalic and atraumatic.       Right Ear: Tympanic membrane and external ear normal.      Left Ear: Tympanic membrane and external ear normal.   Eyes:      Pupils: Pupils are equal, round, and reactive to light. Cardiovascular:      Rate and Rhythm: Normal rate and regular rhythm. Pulmonary:      Effort: Pulmonary effort is normal. No respiratory distress. Breath sounds: Normal breath sounds. No wheezing or rales. Chest:      Chest wall: No tenderness. Abdominal:      General: Bowel sounds are normal. There is no distension. Palpations: Abdomen is soft. Tenderness: There is no abdominal tenderness. There is no guarding. Skin:     General: Skin is warm and dry. Neurological:      Mental Status: She is alert and oriented to person, place, and time. Coordination: Coordination normal.   Psychiatric:         Mood and Affect: Mood normal.       Assessment and Plan:  Sophia Martinez was seen today for annual exam.    Diagnoses and all orders for this visit:    Annual exam   - TSH in June stable. - Lipid panel and CMP also uptodate. - Lifestyle modifications such as exercise, weight loss and healthy diet encouraged and reviewed with the pt. - Feels safe in her home and in her relationships.   - Wears her seatbelt in the car. Essential hypertension   - Blood pressure stable today-Borderline. Recommend patient increase lifestyle modifications. - Lifestyle modifications such as exercise, weight loss and healthy diet encouraged and reviewed with the pt. - Recommend patient monitor blood pressure at home and call with elevated readings    Type 1 diabetes mellitus without complication [facility])  -    Reviewed endocrinology note. I do not see microalbuminuria completed in the last year. Patient  states she does not believe she has performed this test this year. - Last A1c stable  - MICROALBUMIN / CREATININE URINE RATIO; Future  - Continue with endocrinology. Anxiety and depression/PTSD (post-traumatic stress disorder)  -     Mood well controlled without side effects. PHQ9 is 0. Denies SI/HI.   - sertraline (ZOLOFT) 100 MG tablet; TAKE 1 TABLET BY MOUTH ONCE DAILY  -     amitriptyline (ELAVIL) 100 MG tablet; Take 1 tablet by mouth nightly    Mixed hyperlipidemia  -     Denies side effects  - Continue current medication regimen  - pravastatin (PRAVACHOL) 40 MG tablet; Take 1 tablet by mouth once daily  - Lifestyle modifications such as exercise, weight loss and healthy diet encouraged and reviewed with the pt. Genital herpes simplex, unspecified site  -     No recent flares. Denies side effects. Asymptomatic.   - Continue current regimen  - acyclovir (ZOVIRAX) 200 MG capsule; Take 2 capsules by mouth twice daily    Vitamin D deficiency   - 10/4/21 Vit d was drawn and 29.9- states this dose was increased by endocrinology. Asymptomatic. Seasonal allergic rhinitis due to other allergic trigger  -    Well controlled without side effects. Continue current regimen.   - fluticasone (FLONASE) 50 MCG/ACT nasal spray; 1 spray by Each Nostril route daily    Lumbar pain   - Continue with ortho    Chronic right shoulder pain  -    Well controlled on the current regimen without side effects  - Continue current regimen   - cyclobenzaprine (FLEXERIL) 5 MG tablet; TAKE 1 TABLET BY MOUTH NIGHTLY AS NEEDED FOR MUSCLE SPASM    Screen for colon cancer  -     POCT Fecal Immunochemical Test (FIT); Future    Need for Tdap vaccination  -     Tdap (age 6y and older) IM ([address]) - patient education handout provided and reviewed with the pt. Need influenza vaccine   - Flu shot - patient education handout provided and reviewed with the pt. Return in about 6 months (around 5/9/2022) for HTN/mood/HLD f/u, or sooner if needed. Pt will call if symptoms worsen or fail to improve. All questions answered. Pt states no further questions or concerns at this time.    Electronically signed by: BHAVANA Hobbs - GLORIA 11/09/21

## 2021-11-12 ENCOUNTER — TELEPHONE (OUTPATIENT)
Dept: ORTHOPEDIC SURGERY | Age: 66
End: 2021-11-12

## 2021-11-12 DIAGNOSIS — M48.061 DEGENERATIVE LUMBAR SPINAL STENOSIS: Primary | ICD-10-CM

## 2021-11-12 NOTE — TELEPHONE ENCOUNTER
Prescription Refill     Medication Name:  HYDROCODONE 5-325 MG  Pharmacy: [address] [place], [place] [address]Catie Carney  Patient Contact Number:  [phone]    PATIENT HAS 2 PILLS  LEFT AND TAKE 2 A DAY, BUT NOT A THE SAME TIME

## 2021-11-15 RX ORDER — HYDROCODONE BITARTRATE AND ACETAMINOPHEN 5; 325 MG/1; MG/1
.5-1 TABLET ORAL 2 TIMES DAILY PRN
Qty: 20 TABLET | Refills: 0 | Status: SHIPPED | OUTPATIENT
Start: 2021-11-15 | End: 2021-11-25

## 2021-11-15 RX ORDER — HYDROCODONE BITARTRATE AND ACETAMINOPHEN 5; 325 MG/1; MG/1
.5-1 TABLET ORAL 2 TIMES DAILY PRN
Qty: 20 TABLET | Refills: 0 | Status: SHIPPED | OUTPATIENT
Start: 2021-11-15 | End: 2021-11-15 | Stop reason: SDUPTHER

## 2021-11-18 ENCOUNTER — TELEPHONE (OUTPATIENT)
Dept: ORTHOPEDIC SURGERY | Age: 66
End: 2021-11-18

## 2021-11-18 ENCOUNTER — HOSPITAL ENCOUNTER (OUTPATIENT)
Dept: INTERVENTIONAL RADIOLOGY/VASCULAR | Age: 66
Discharge: HOME | End: 2021-11-18
Payer: MEDICAID

## 2021-11-18 DIAGNOSIS — M51.36 ANNULAR TEAR OF LUMBAR DISC: ICD-10-CM

## 2021-11-18 PROCEDURE — 62323 NJX INTERLAMINAR LMBR/SAC: CPT | Performed by: RADIOLOGY

## 2021-11-18 PROCEDURE — 2500000003 HC RX 250 WO HCPCS

## 2021-11-18 PROCEDURE — 6360000004 HC RX CONTRAST MEDICATION: Performed by: RADIOLOGY

## 2021-11-18 PROCEDURE — 6360000002 HC RX W HCPCS

## 2021-11-18 RX ADMIN — IOHEXOL 10 ML: 180 INJECTION INTRAVENOUS at 13:32

## 2021-11-18 NOTE — TELEPHONE ENCOUNTER
Jose Luis Gupta, I just had a lady on the phone wanting to make sure she is getting her injection today(back). the call dropped can you help? She stated she was at the hospital now. .. Roxana Bartholomew Called patient back and told her I would send a message to you.

## 2021-11-23 ENCOUNTER — HOSPITAL ENCOUNTER (OUTPATIENT)
Dept: GENERAL RADIOLOGY | Age: 66
Discharge: HOME | End: 2021-11-23
Payer: MEDICAID

## 2021-11-23 DIAGNOSIS — M51.36 DDD (DEGENERATIVE DISC DISEASE), LUMBAR: ICD-10-CM

## 2021-11-23 DIAGNOSIS — S32.010S CLOSED WEDGE COMPRESSION FRACTURE OF L1 VERTEBRA, SEQUELA: ICD-10-CM

## 2021-11-23 DIAGNOSIS — M47.816 LUMBAR SPONDYLOSIS: ICD-10-CM

## 2021-11-23 PROCEDURE — 77080 DXA BONE DENSITY AXIAL: CPT

## 2021-11-30 ENCOUNTER — OFFICE VISIT (OUTPATIENT)
Dept: ORTHOPEDIC SURGERY | Age: 66
End: 2021-11-30
Payer: MEDICAID

## 2021-11-30 VITALS — BODY MASS INDEX: 31.47 KG/M2 | HEIGHT: 59 IN | WEIGHT: 156.09 LBS

## 2021-11-30 DIAGNOSIS — Z79.4 TYPE 2 DIABETES MELLITUS WITHOUT COMPLICATION, WITH LONG-TERM CURRENT USE OF INSULIN (HCC): ICD-10-CM

## 2021-11-30 DIAGNOSIS — S32.010S CLOSED WEDGE COMPRESSION FRACTURE OF L1 VERTEBRA, SEQUELA: Primary | ICD-10-CM

## 2021-11-30 DIAGNOSIS — M48.061 DEGENERATIVE LUMBAR SPINAL STENOSIS: ICD-10-CM

## 2021-11-30 DIAGNOSIS — M47.816 LUMBAR SPONDYLOSIS: ICD-10-CM

## 2021-11-30 DIAGNOSIS — M51.36 DDD (DEGENERATIVE DISC DISEASE), LUMBAR: ICD-10-CM

## 2021-11-30 DIAGNOSIS — M80.00XG OSTEOPOROSIS WITH CURRENT PATHOLOGICAL FRACTURE WITH DELAYED HEALING, UNSPECIFIED OSTEOPOROSIS TYPE, SUBSEQUENT ENCOUNTER: ICD-10-CM

## 2021-11-30 DIAGNOSIS — E11.9 TYPE 2 DIABETES MELLITUS WITHOUT COMPLICATION, WITH LONG-TERM CURRENT USE OF INSULIN (HCC): ICD-10-CM

## 2021-11-30 PROCEDURE — 3044F HG A1C LEVEL LT 7.0%: CPT | Performed by: INTERNAL MEDICINE

## 2021-11-30 PROCEDURE — 2022F DILAT RTA XM EVC RTNOPTHY: CPT | Performed by: INTERNAL MEDICINE

## 2021-11-30 PROCEDURE — G8417 CALC BMI ABV UP PARAM F/U: HCPCS | Performed by: INTERNAL MEDICINE

## 2021-11-30 PROCEDURE — G8482 FLU IMMUNIZE ORDER/ADMIN: HCPCS | Performed by: INTERNAL MEDICINE

## 2021-11-30 PROCEDURE — G8427 DOCREV CUR MEDS BY ELIG CLIN: HCPCS | Performed by: INTERNAL MEDICINE

## 2021-11-30 PROCEDURE — 99214 OFFICE O/P EST MOD 30 MIN: CPT | Performed by: INTERNAL MEDICINE

## 2021-11-30 PROCEDURE — 3017F COLORECTAL CA SCREEN DOC REV: CPT | Performed by: INTERNAL MEDICINE

## 2021-11-30 PROCEDURE — 1036F TOBACCO NON-USER: CPT | Performed by: INTERNAL MEDICINE

## 2021-11-30 RX ORDER — HYDROCODONE BITARTRATE AND ACETAMINOPHEN 5; 325 MG/1; MG/1
1 TABLET ORAL DAILY PRN
Qty: 30 TABLET | Refills: 0 | Status: SHIPPED | OUTPATIENT
Start: 2021-11-30 | End: 2021-11-30 | Stop reason: SDUPTHER

## 2021-11-30 RX ORDER — HYDROCODONE BITARTRATE AND ACETAMINOPHEN 5; 325 MG/1; MG/1
1 TABLET ORAL DAILY PRN
Qty: 30 TABLET | Refills: 0 | Status: SHIPPED | OUTPATIENT
Start: 2021-11-30 | End: 2021-12-30

## 2021-11-30 RX ORDER — CELECOXIB 200 MG/1
200 CAPSULE ORAL DAILY PRN
Qty: 30 CAPSULE | Refills: 3 | Status: SHIPPED | OUTPATIENT
Start: 2021-11-30 | End: 2022-02-23 | Stop reason: SDUPTHER

## 2021-11-30 NOTE — PROGRESS NOTES
Chief Complaint:   Chief Complaint   Patient presents with    Lower Back Pain     Lumbar pain, states pain is about 50% better after inj. Still having pain 8/10. Still taking pain meds          History of Present Illness:       Patient is a 61 y.o. female returns follow up for the above complaint. The patient was last seen approximately 1 monthsago. The symptoms are improving since the last visit. The patient has had further testing for the problem. The interim DEXA scan was completed which is outlined below in detail consistent with osteoporosis. She estimates 40 to 50% improvement with respect to recent L AGUILA 11/18/2021    Pain levels 4-8/10. Standing satish tolerated than sitting. Delayed permission 100: 0    Back pain localizes to the lumbosacral region bandlike distribution stabbing in quality    Back pain is most predictable in the morning         Past Medical History:        Past Medical History:   Diagnosis Date    Anxiety     Depression     Diabetes mellitus (HCC)     Genital herpes     Hyperlipidemia     Hypertension     PTSD (post-traumatic stress disorder)     Vitamin D deficiency         Present Medications:         Current Outpatient Medications   Medication Sig Dispense Refill    celecoxib (CELEBREX) 200 MG capsule Take 1 capsule by mouth daily as needed for Pain 30 capsule 3    HYDROcodone-acetaminophen (NORCO) 5-325 MG per tablet Take 1 tablet by mouth daily as needed for Pain for up to 30 days.  30 tablet 0    cyclobenzaprine (FLEXERIL) 5 MG tablet TAKE 1 TABLET BY MOUTH NIGHTLY AS NEEDED FOR MUSCLE SPASM 90 tablet 0    pravastatin (PRAVACHOL) 40 MG tablet Take 1 tablet by mouth once daily 90 tablet 1    sertraline (ZOLOFT) 100 MG tablet TAKE 1 TABLET BY MOUTH ONCE DAILY 90 tablet 1    amitriptyline (ELAVIL) 100 MG tablet Take 1 tablet by mouth nightly 90 tablet 1    fluticasone (FLONASE) 50 MCG/ACT nasal spray 1 spray by Each Nostril route daily 1 each 2    acyclovir (ZOVIRAX) 200 MG capsule Take 2 capsules by mouth twice daily 120 capsule 1    Cholecalciferol (VITAMIN D3) 25 MCG (1000 UT) CAPS Take 2 capsules by mouth daily 180 capsule 1    insulin glargine (LANTUS) 100 UNIT/ML injection vial Inject 50 Units into the skin every morning      Calcium-Vitamins C & D (CALCIUM/C/D PO) Take by mouth      Multiple Vitamins-Minerals (MULTI-AFIA PO) Take by mouth      Glucosamine-MSM-Hyaluronic Acd (JOINT HEALTH PO) Take by mouth      aspirin 81 MG tablet Take 81 mg by mouth daily      insulin lispro (HUMALOG) 100 UNIT/ML injection vial Inject 15 Units into the skin 3 times daily (before meals) Indications: with sliding scale Sliding scal   <200 = 0 units   221-250 = 1 unit  251-300 = 2 units   301 - 350 = 3 units   351-400= 4 units   401-430 = 5 units       No current facility-administered medications for this visit. Allergies: Allergies   Allergen Reactions    Atorvastatin Swelling    Lisinopril Other (See Comments)    Naproxen Nausea Only    Oxycodone-Acetaminophen Other (See Comments)           Review of Systems:    Pertinent items are noted in HPI       Vital Signs: There were no vitals filed for this visit. General Exam:     Constitutional: Patient is adequately groomed with no evidence of malnutrition    Physical Exam: lower back      Primary Exam:    Inspection: No deformity atrophy appreciable curvature      Palpation: No focal trigger point tenderness      Range of Motion: 65-70/5 pain with flexion      Strength: Normal lower extremity      Special Tests: Negative SLR      Skin: There are no rashes, ulcerations or lesions.       Gait: Nonantalgic    Neurovascular - non focal and intact       Additional Comments:        Additional Examinations:                  Office Imaging Results/Procedures PerformedToday:          Office Procedures:     Orders Placed This Encounter   Procedures    External Referral to Endocrinology     Referral Priority: Routine     Referral Type:   Eval and Treat     Referral Reason:   Specialty Services Required     Requested Specialty:   Endocrinology     Number of Visits Requested:   1           Other Outside Imaging and Testing Personally Reviewed:    DEXA BONE DENSITY AXIAL SKELETON    Result Date: 11/23/2021  EXAMINATION: BONE DENSITOMETRY 11/23/2021 2:39 pm TECHNIQUE: A bone density DEXA scan was performed of the lumbar spine and bilateral hips on a Hologic system. COMPARISON: None. HISTORY: ORDERING SYSTEM PROVIDED HISTORY: Lumbar spondylosis TECHNOLOGIST PROVIDED HISTORY: [facility], call [phone] to schedule Reason for exam:->current fx at L1 FINDINGS: LUMBAR SPINE: The bone mineral density in the lumbar spine including the L1-L4 levels is measured at 1.017 g/cm2, which corresponds to a T-score of -0.3 and a Z-score of 1.5. This is within the normal range by WHO criteria. L1 compression fracture is better appreciated on 08/26/2021 CT. LEFT HIP: The bone mineral density in the total hip is measured at 0.789 g/cm2 corresponding to a T-score of -1.3 and a Z-score of 0.0. This is within the osteopenia range by WHO criteria. The bone mineral density of the femoral neck is measured at 0.527 g/cm2 corresponding to a T-score of -2.9 and a Z-score of -1.4. This is within the osteoporosis range by WHO criteria. RIGHT HIP: The bone mineral density in the total hip is measured at 0.874 g/cm2 corresponding to a T-score of -0.6 and a Z-score of 0.7. This is within the normal range by WHO criteria. The bone mineral density of the femoral neck is measured at 0.653 g/cm2 corresponding to a T-score of -1.8 and a Z-score of -0.3. This is within the osteopenia range by WHO criteria. Osteoporosis by WHO criteria. Assessment   Impression: . Encounter Diagnoses   Name Primary?     Closed wedge compression fracture of L1 vertebra, sequela Yes    Osteoporosis with current pathological fracture with delayed healing, unspecified osteoporosis type, subsequent encounter     Degenerative lumbar spinal stenosis     DDD (degenerative disc disease), lumbar     Lumbar spondylosis     Type 2 diabetes mellitus without complication, with long-term current use of insulin (HCC)         L1 vertebral compression fracture clinically healed radiographically stable      Plan:       Consider transition to active PT as needed and consider repeat L AGUILA versus lumbar facet medial branch block as needed  Activity modification lumbar disc protocol/vertebral compression fracture protocol  Medical pain management-Celebrex daily. No GI precaution and as needed use of hydrocodone minimize use of narcotics which she is doing caution relative to constipation  Continue maintenance HEP and trunk rotation exercises prior to rising from bed strategy to managing her morning disability with respect to back pain  Neurology consultation-Dr. Adali Goss treatment             Orders:        Orders Placed This Encounter   Procedures    External Referral to Endocrinology     Referral Priority:   Routine     Referral Type:   Eval and Treat     Referral Reason:   Specialty Services Required     Requested Specialty:   Endocrinology     Number of Visits Requested:   1         Christiano Amaya MD.      Adriana Prima: \"This note was dictated with voice recognition software. Though review and correction are routine, we apologize for any errors. \"

## 2021-12-01 ENCOUNTER — TELEPHONE (OUTPATIENT)
Dept: ORTHOPEDIC SURGERY | Age: 66
End: 2021-12-01

## 2021-12-01 NOTE — TELEPHONE ENCOUNTER
Spoke with Lina Sullivan letting her know we are faxing over the referral for Juan Taylor NP for her Osteoporosis.  Pt understood

## 2021-12-27 NOTE — TELEPHONE ENCOUNTER
Patient calling requesting refill of cyclobenzaprine (FLEXERIL) 5 MG tablet    Last written 11/09/21  Last OV 11/09/21  Next OV 05/10/22  Last recommended OV 05/09/22     Please send to [email or web address] in [place].

## 2021-12-28 NOTE — TELEPHONE ENCOUNTER
Patient was advised that she is not due for refill, if taken as prescribed. She should have medication until February. 90 tablets were sent on 11/9/21 with directions to take 1 daily prn. Patient states that she is not out of medicine. Refill will not be sent at this time.

## 2021-12-29 ENCOUNTER — TELEPHONE (OUTPATIENT)
Dept: ORTHOPEDIC SURGERY | Age: 66
End: 2021-12-29

## 2021-12-29 DIAGNOSIS — M48.061 DEGENERATIVE LUMBAR SPINAL STENOSIS: Primary | ICD-10-CM

## 2021-12-29 RX ORDER — HYDROCODONE BITARTRATE AND ACETAMINOPHEN 5; 325 MG/1; MG/1
.5-1 TABLET ORAL EVERY 6 HOURS PRN
Qty: 20 TABLET | Refills: 0 | Status: SHIPPED | OUTPATIENT
Start: 2021-12-29 | End: 2022-01-08

## 2021-12-29 NOTE — TELEPHONE ENCOUNTER
Prescription Refill     Medication Name:  HYDROCODONE  Pharmacy: [id number]  Patient Contact Number:  [phone]

## 2021-12-29 NOTE — TELEPHONE ENCOUNTER
Pt requesting refill. Last prescribed 11/30/21 for use for up to 30 days.  Tried calling Pt, no answer

## 2022-02-04 DIAGNOSIS — M25.511 CHRONIC RIGHT SHOULDER PAIN: ICD-10-CM

## 2022-02-04 DIAGNOSIS — G89.29 CHRONIC RIGHT SHOULDER PAIN: ICD-10-CM

## 2022-02-04 RX ORDER — CYCLOBENZAPRINE HCL 5 MG
TABLET ORAL
Qty: 90 TABLET | Refills: 0 | Status: SHIPPED | OUTPATIENT
Start: 2022-02-04 | End: 2022-05-10 | Stop reason: SDUPTHER

## 2022-02-04 NOTE — TELEPHONE ENCOUNTER
----- Message from Ivette To sent at 2/3/2022  3:07 PM EST -----  Subject: Refill Request    QUESTIONS  Name of Medication? acyclovir (ZOVIRAX) 200 MG capsule  Patient-reported dosage and instructions? 2 daily   How many days do you have left? 3  Preferred Pharmacy? [address]  Pharmacy phone number (if available)? [phone]  ---------------------------------------------------------------------------  --------------,  Name of Medication? cyclobenzaprine (FLEXERIL) 5 MG tablet  Patient-reported dosage and instructions? 1 tablet at bedtime  How many days do you have left? 0  Preferred Pharmacy? [address]  Pharmacy phone number (if available)? [phone]  ---------------------------------------------------------------------------  --------------,  Name of Medication? sertraline (ZOLOFT) 100 MG tablet  Patient-reported dosage and instructions? 1 tablet daily  How many days do you have left? 0  Preferred Pharmacy? [address]  Pharmacy phone number (if available)? [phone]  ---------------------------------------------------------------------------  --------------  Mallika MARTI  What is the best way for the office to contact you? OK to leave message on   voicemail  Preferred Call Back Phone Number?  [phone]

## 2022-02-15 ENCOUNTER — TELEPHONE (OUTPATIENT)
Dept: ORTHOPEDIC SURGERY | Age: 67
End: 2022-02-15

## 2022-02-15 NOTE — TELEPHONE ENCOUNTER
Prescription Refill     Medication Name:  [address]: [facility] [address], [address] [phone] [place] [phone]  Patient Contact Number:  [phone]

## 2022-02-18 NOTE — TELEPHONE ENCOUNTER
Called and spoke to pt to ask about pain meds refill. Pt states that she is doing ok and does not need it at this time. Pt states that she fell recently, and was very sore, but is getting better. Has F/U appt on 2/23/22. States that she will not pursue refill at this time, and will address it on F/U prn.

## 2022-02-23 ENCOUNTER — OFFICE VISIT (OUTPATIENT)
Dept: ORTHOPEDIC SURGERY | Age: 67
End: 2022-02-23
Payer: MEDICARE

## 2022-02-23 VITALS — HEIGHT: 59 IN | WEIGHT: 156.09 LBS | BODY MASS INDEX: 31.47 KG/M2

## 2022-02-23 DIAGNOSIS — M51.36 DDD (DEGENERATIVE DISC DISEASE), LUMBAR: Primary | ICD-10-CM

## 2022-02-23 DIAGNOSIS — M47.816 LUMBAR SPONDYLOSIS: ICD-10-CM

## 2022-02-23 DIAGNOSIS — M80.00XG OSTEOPOROSIS WITH CURRENT PATHOLOGICAL FRACTURE WITH DELAYED HEALING, UNSPECIFIED OSTEOPOROSIS TYPE, SUBSEQUENT ENCOUNTER: ICD-10-CM

## 2022-02-23 DIAGNOSIS — M48.061 DEGENERATIVE LUMBAR SPINAL STENOSIS: ICD-10-CM

## 2022-02-23 DIAGNOSIS — S32.010S CLOSED WEDGE COMPRESSION FRACTURE OF L1 VERTEBRA, SEQUELA: ICD-10-CM

## 2022-02-23 DIAGNOSIS — Z86.39 HISTORY OF DIABETES MELLITUS: ICD-10-CM

## 2022-02-23 PROCEDURE — 99214 OFFICE O/P EST MOD 30 MIN: CPT | Performed by: INTERNAL MEDICINE

## 2022-02-23 RX ORDER — CELECOXIB 200 MG/1
200 CAPSULE ORAL DAILY PRN
Qty: 30 CAPSULE | Refills: 3 | Status: SHIPPED | OUTPATIENT
Start: 2022-02-23 | End: 2022-08-27 | Stop reason: SDUPTHER

## 2022-02-23 RX ORDER — LIDOCAINE 50 MG/G
1 PATCH TOPICAL EVERY 12 HOURS
Qty: 30 PATCH | Refills: 0 | Status: SHIPPED | OUTPATIENT
Start: 2022-02-23

## 2022-02-23 RX ORDER — HYDROCODONE BITARTRATE AND ACETAMINOPHEN 5; 325 MG/1; MG/1
1 TABLET ORAL EVERY 6 HOURS PRN
Qty: 10 TABLET | Refills: 0 | Status: SHIPPED | OUTPATIENT
Start: 2022-02-23 | End: 2022-03-09

## 2022-02-23 NOTE — PROGRESS NOTES
Chief Complaint:   Chief Complaint   Patient presents with    Back Pain     Feels 80% better overall, exercises are going well, schedule an appt w/ Dr. Denise Bai for next week. History of Present Illness:       Patient is a 74 y.o. female returns follow up for the above complaint. The patient was last seen approximately 3 monthsago. The symptoms are improving since the last visit. The patient has had no further testing for the problem. She estimates 80% improvement overall and no significant \"flares\" in the interim. Sitting can be equally problematic as standing pain levels 2/10 severity on average and is never completely pain free. Despite this she is very pleased with her improvement. She occasionally will experience \"burning\" quality pain left lower lumbar/lumbosacral which is responsive to heat.     She denies any lower limb symptoms she denies any new onset or progressive weakness of the lower extremities    She denies any new onset bowel or bladder dysfunction    Consultation with endocrinology is pending    She is using Celebrex only intermittently and has had no requirement for narcotic analgesia     Past Medical History:        Past Medical History:   Diagnosis Date    Anxiety     Depression     Diabetes mellitus ([facility] [address]     Genital herpes     Hyperlipidemia     Hypertension     PTSD (post-traumatic stress disorder)     Vitamin D deficiency         Present Medications:         Current Outpatient Medications   Medication Sig Dispense Refill    cyclobenzaprine (FLEXERIL) 5 MG tablet TAKE 1 TABLET BY MOUTH NIGHTLY AS NEEDED FOR MUSCLE SPASM 90 tablet 0    celecoxib (CELEBREX) 200 MG capsule Take 1 capsule by mouth daily as needed for Pain 30 capsule 3    pravastatin (PRAVACHOL) 40 MG tablet Take 1 tablet by mouth once daily 90 tablet 1    sertraline (ZOLOFT) 100 MG tablet TAKE 1 TABLET BY MOUTH ONCE DAILY 90 tablet 1    amitriptyline (ELAVIL) 100 MG tablet Take 1 tablet by mouth nightly 90 tablet 1    fluticasone (FLONASE) 50 MCG/ACT nasal spray 1 spray by Each Nostril route daily 1 each 2    acyclovir (ZOVIRAX) 200 MG capsule Take 2 capsules by mouth twice daily 120 capsule 1    Cholecalciferol (VITAMIN D3) 25 MCG (1000 UT) CAPS Take 2 capsules by mouth daily 180 capsule 1    insulin glargine (LANTUS) 100 UNIT/ML injection vial Inject 50 Units into the skin every morning      Calcium-Vitamins C & D (CALCIUM/C/D PO) Take by mouth      Multiple Vitamins-Minerals (MULTI-AFIA PO) Take by mouth      Glucosamine-MSM-Hyaluronic Acd (JOINT HEALTH PO) Take by mouth      aspirin 81 MG tablet Take 81 mg by mouth daily      insulin lispro (HUMALOG) 100 UNIT/ML injection vial Inject 15 Units into the skin 3 times daily (before meals) Indications: with sliding scale Sliding scal   <200 = 0 units   221-250 = 1 unit  251-300 = 2 units   301 - 350 = 3 units   351-400= 4 units   401-430 = 5 units       No current facility-administered medications for this visit. Allergies: Allergies   Allergen Reactions    Atorvastatin Swelling    Lisinopril Other (See Comments)    Naproxen Nausea Only    Oxycodone-Acetaminophen Other (See Comments)           Review of Systems:    Pertinent items are noted in HPI    Review of systems reviewed from Patient History Form dated on    and   available in the patient's chart under the Media tab. Vital Signs: There were no vitals filed for this visit. General Exam:     Constitutional: Patient is adequately groomed with no evidence of malnutrition    Physical Exam: lower back      Primary Exam:    Inspection: No deformity atrophy appreciable curvature      Palpation: No focal trigger point tenderness      Range of Motion: 80/5 pain with extension      Strength: Normal lower extremity      Special Tests: Negative SLR      Skin: There are no rashes, ulcerations or lesions.       Gait: Nonantalgic      Neurovascular - non focal and intact     Additional Comments:        Additional Examinations:                    Office Imaging Results/Procedures PerformedToday:          Office Procedures:   No orders of the defined types were placed in this encounter. Epidural steroid injection, Epidurography       History : low back pain lumbar radiculopathy.       COMMENTS :       The low back was prepped and draped in sterile fashion and lidocaine used for local anesthesia. Under fluoroscopic guidance, a 22 gauge Tuohy needle was advanced into the epidural space at the L4/5 level from an interlaminar approach and needle position    was documented with contrast injection. 80 mg Kenalog, 1cc (2.5 mg) 0.25% Sensorcaine, and 2 cc sterile saline were injected.  The patient tolerated the procedure without complication.         DIAGNOSIS :       L4/5 translaminar epidural injection of Kenalog and Sensorcaine.           Fluoroscopy time : 0.3 minutes   Number of exposures obtained : 1       Estimated blood loss: Less than 5 mL.                 Other Outside Imaging and Testing Personally Reviewed:    No results found. Assessment   Impression: . Encounter Diagnoses   Name Primary?  Closed wedge compression fracture of L1 vertebra, sequela     Osteoporosis with current pathological fracture with delayed healing, unspecified osteoporosis type, subsequent encounter     Degenerative lumbar spinal stenosis     DDD (degenerative disc disease), lumbar Yes    Lumbar spondylosis     History of diabetes mellitus         L1 vertebral compression fracture clinically healed radiographically stable      Plan:       Maintenance lumbar stabilization home exercises and consider supervised sessions of PT as needed  Medical pain management as per previous Celebrex and as needed use of Norco No. 10 tablets provided  Follow-up with endocrinology with respect to osteoporosis management  Consider repeat lumbar spine intervention injection only.   Trial of Lidoderm patches and local measures for symptom control   Repeat x-rays standing AP and lateral on follow-up         Orders:      No orders of the defined types were placed in this encounter. Nandini Wright MD.      Disclaimer: \"This note was dictated with voice recognition software. Though review and correction are routine, we apologize for any errors. \"

## 2022-02-28 LAB
AVERAGE GLUCOSE: NORMAL
HBA1C MFR BLD: 6.2 %

## 2022-04-11 ENCOUNTER — OFFICE VISIT (OUTPATIENT)
Dept: PSYCHOLOGY | Age: 67
End: 2022-04-11
Payer: MEDICARE

## 2022-04-11 DIAGNOSIS — F43.10 PTSD (POST-TRAUMATIC STRESS DISORDER): Primary | ICD-10-CM

## 2022-04-11 PROCEDURE — 90832 PSYTX W PT 30 MINUTES: CPT | Performed by: PSYCHOLOGIST

## 2022-04-11 NOTE — PROGRESS NOTES
Behavioral Health Consultation  Ayan Avila, Ph.D.  Psychologist  4/11/2022  3:12 PM      Time spent with Patient: 24 minutes  This is patient's 13th  [facility] appointment. Reason for Consult:    Chief Complaint   Patient presents with    Anxiety       Feedback given to PCP. S:  Pt seen for f/u of PTSD. Pt reported stable mood and sxs. Seen for first appt in almost 6 mos. Pleased she bought a new car, sold her old home. Stated she has had peace and closure over the situation w  and his family. She spoke to him around his birthday, was pleased w how good he sounded. Considered visiting . BS went up to 400. Identified she is still fearful of seeing him and doesn't plan to do so. Reported she does experience depressed mood if she misses a dose of sertraline for 2 days. Struggling w pain in her hips. Encouraged f/u w PCP Zonia Garza and PT, if indicated.  Agreed to resuming tapered f/u.     O:  MSE:  Appearance    alert, cooperative  Appetite normal  Sleep disturbance No  Fatigue Yes  Loss of pleasure No  Impulsive behavior No  Speech    spontaneous, normal rate, normal volume and well articulated  Mood    euhymic  Affect    normal affect  Thought Content    intact  Thought Process    linear, goal directed and coherent  Associations    logical connections  Insight    Fair  Judgment    Intact  Orientation    oriented to person, place, time, and general circumstances  Memory    recent and remote memory intact  Attention/Concentration    intact  Morbid ideation No  Suicide Assessment    no suicidal ideation    History:  Social History:   Social History     Socioeconomic History    Marital status: Legally      Spouse name: Not on file    Number of children: Not on file    Years of education: Not on file    Highest education level: Not on file   Occupational History    Not on file   Tobacco Use    Smoking status: Never Smoker    Smokeless tobacco: Never Used   Vaping Use    Vaping Use: Never used   Substance and Sexual Activity    Alcohol use: Not Currently    Drug use: Never    Sexual activity: Not Currently   Other Topics Concern    Not on file   Social History Narrative    Moved from [place] to [place]. Has a daughter, son-in-law and grandchildren live with her. Social Determinants of Health     Financial Resource Strain: Low Risk     Difficulty of Paying Living Expenses: Not hard at all   Food Insecurity: No Food Insecurity    Worried About Running Out of Food in the Last Year: Never true    Nico of Food in the Last Year: Never true   Transportation Needs:     Lack of Transportation (Medical): Not on file    Lack of Transportation (Non-Medical): Not on file   Physical Activity:     Days of Exercise per Week: Not on file    Minutes of Exercise per Session: Not on file   Stress:     Feeling of Stress : Not on file   Social Connections:     Frequency of Communication with Friends and Family: Not on file    Frequency of Social Gatherings with Friends and Family: Not on file    Attends [employer] Services: Not on file    Active Member of [address] [place] [address] [employer] or Organizations: Not on file    Attends Club or Organization Meetings: Not on file    Marital Status: Not on file   Intimate Partner Violence:     Fear of Current or Ex-Partner: Not on file    Emotionally Abused: Not on file    Physically Abused: Not on file    Sexually Abused: Not on file   Housing Stability:     Unable to Pay for Housing in the Last Year: Not on file    Number of [place] in the Last Year: Not on file    Unstable Housing in the Last Year: Not on file     TOBACCO:   reports that she has never smoked. She has never used smokeless tobacco.  ETOH:   reports previous alcohol use. Diagnosis:  1.  PTSD (post-traumatic stress disorder)          Plan:  Pt interventions:  Trained in strategies for increasing balanced thinking, Discussed and problem-solved barriers in adhering to behavioral change plan and Supportive techniques        Documentation was done using voice recognition dragon software. Every effort was made to ensure accuracy; however, inadvertent, unintentional computerized transcription errors may be present.

## 2022-04-21 ENCOUNTER — TELEPHONE (OUTPATIENT)
Dept: INTERNAL MEDICINE CLINIC | Age: 67
End: 2022-04-21

## 2022-04-21 NOTE — TELEPHONE ENCOUNTER
Submitted PA for Cyclobenzaprine HCl 5MG tablets. Key: [id number]. Via CM STATUS: APPROVED /1/2022 12:00:00 AM, Coverage Ends on: 4/21/2023. Will scan letter when received. If this requires a response please respond to the pool ( P [facility] [address] [facility]). Thank you please advise patient.

## 2022-04-26 ENCOUNTER — TELEPHONE (OUTPATIENT)
Dept: PHARMACY | Facility: CLINIC | Age: 67
End: 2022-04-26

## 2022-04-26 NOTE — TELEPHONE ENCOUNTER
Noted - thank you! Will sign off.      Shayy Fermin, PharmD, [place] // Department, toll free [phone], option 1      For Pharmacy [id number] [place] [address] Road in place:  No   Recommendation Provided To: Patient/Caregiver: 1 via Telephone   Intervention Detail: Adherence Monitorin   Gap Closed?: Yes    Intervention Accepted By: Patient/Caregiver: 1   Time Spent (min): 20

## 2022-04-26 NOTE — TELEPHONE ENCOUNTER
Patient returned call and I spoke with her about message she received in regards to Pravastatin. She stated that she changed insurance last year and had supply on had at the time. She ran out recently and said that she does need a refill. Called local [facility] and requested a refill for patient. Stated that they will process today and it will be ready for  in the morning.

## 2022-04-26 NOTE — TELEPHONE ENCOUNTER
[facility] CLINICAL PHARMACY: STATIN THERAPY REVIEW  Identified statin use in persons with diabetes care gap per Vivek. Records dated: 4/4/22. Last Office Visit: 11/9/21    Patient also appears to be taking: Lantus and Humalog    Patient not found in Outcomes [facility] Records claims through 4/4/22: n/a; prescribed insulin    Lab Results   Component Value Date    LABA1C 6.2 02/28/2022    LABA1C 6.5 06/18/2021    LABA1C 7.0 09/01/2020    LABA1C 7.0 09/01/2020     NOTE A1c <9%    STATIN GAP IDENTIFIED    Per chart review, patient is prescribed pravastatin 40 mg daily. Per Reconciled Dispense Report:   Pravastatin last filled on 10/28/21 for a 90 day supply. Per [facility]:   Pravastatin last picked up on 10/28/21 for 90 day supply. Pharmacy has filled several times since October, but it has been cancelled by the patient every time \"patient does not want rx\". Lab Results   Component Value Date    CHOL 179 09/01/2020    TRIG 120 09/01/2020    HDL 47 09/01/2020    LDLCALC 131 (H) 07/18/2019     ALT   Date Value Ref Range Status   09/18/2019 10 10 - 40 U/L Final     AST   Date Value Ref Range Status   09/18/2019 15 15 - 37 U/L Final       The 10-year ASCVD risk score ([address] et al., 2013) is: 12.2%    Values used to calculate the score:      Age: 74 years      Sex: Female      Is Non- : No      Diabetic: Yes      Tobacco smoker: No      Systolic Blood Pressure: 210 mmHg      Is BP treated: No      HDL Cholesterol: 47 mg/dL      Total Cholesterol: 179 mg/dL     Hyperlipidemia Goal: Patient is prescribed moderate-intensity statin therapy. PLAN  Attempting to reach patient to review.  Left message asking for return call. Will attempt to contact the patient again to discuss pravastatin therapy and why it was stopped.      Future Appointments   Date Time Provider Radha Cowan   5/10/2022  4:00 PM Imani Hall APRN - CNP [facility] - [facility]   8/11/2022  3:00 PM Jessica Melendez, PhD IOF [facility]   8/24/2022  3:50 PM Katlyn Tobar MD [facility] ORTH [facility]       Bebe Dowling, PharmD, Deonna // Department, toll free [phone], option 1

## 2022-05-10 ENCOUNTER — OFFICE VISIT (OUTPATIENT)
Dept: INTERNAL MEDICINE CLINIC | Age: 67
End: 2022-05-10
Payer: MEDICAID

## 2022-05-10 VITALS
OXYGEN SATURATION: 98 % | WEIGHT: 155 LBS | HEART RATE: 97 BPM | HEIGHT: 55 IN | DIASTOLIC BLOOD PRESSURE: 70 MMHG | BODY MASS INDEX: 35.87 KG/M2 | SYSTOLIC BLOOD PRESSURE: 130 MMHG

## 2022-05-10 DIAGNOSIS — M54.50 LUMBAR PAIN: ICD-10-CM

## 2022-05-10 DIAGNOSIS — F41.9 ANXIETY AND DEPRESSION: ICD-10-CM

## 2022-05-10 DIAGNOSIS — Z71.85 VACCINE COUNSELING: ICD-10-CM

## 2022-05-10 DIAGNOSIS — J30.89 SEASONAL ALLERGIC RHINITIS DUE TO OTHER ALLERGIC TRIGGER: ICD-10-CM

## 2022-05-10 DIAGNOSIS — I10 ESSENTIAL HYPERTENSION: ICD-10-CM

## 2022-05-10 DIAGNOSIS — Z91.81 AT HIGH RISK FOR FALLS: ICD-10-CM

## 2022-05-10 DIAGNOSIS — E10.9 TYPE 1 DIABETES MELLITUS WITHOUT COMPLICATION (HCC): Primary | ICD-10-CM

## 2022-05-10 DIAGNOSIS — G89.29 CHRONIC RIGHT SHOULDER PAIN: ICD-10-CM

## 2022-05-10 DIAGNOSIS — E55.9 VITAMIN D DEFICIENCY: ICD-10-CM

## 2022-05-10 DIAGNOSIS — Z12.11 SCREEN FOR COLON CANCER: ICD-10-CM

## 2022-05-10 DIAGNOSIS — R19.5 POSITIVE FIT (FECAL IMMUNOCHEMICAL TEST): ICD-10-CM

## 2022-05-10 DIAGNOSIS — M25.511 CHRONIC RIGHT SHOULDER PAIN: ICD-10-CM

## 2022-05-10 DIAGNOSIS — A60.00 GENITAL HERPES SIMPLEX, UNSPECIFIED SITE: ICD-10-CM

## 2022-05-10 DIAGNOSIS — F43.10 PTSD (POST-TRAUMATIC STRESS DISORDER): ICD-10-CM

## 2022-05-10 DIAGNOSIS — E78.2 MIXED HYPERLIPIDEMIA: ICD-10-CM

## 2022-05-10 DIAGNOSIS — F32.A ANXIETY AND DEPRESSION: ICD-10-CM

## 2022-05-10 LAB
CONTROL: NORMAL
HEMOCCULT STL QL: POSITIVE

## 2022-05-10 PROCEDURE — 99214 OFFICE O/P EST MOD 30 MIN: CPT | Performed by: NURSE PRACTITIONER

## 2022-05-10 PROCEDURE — 1036F TOBACCO NON-USER: CPT | Performed by: NURSE PRACTITIONER

## 2022-05-10 PROCEDURE — 3017F COLORECTAL CA SCREEN DOC REV: CPT | Performed by: NURSE PRACTITIONER

## 2022-05-10 PROCEDURE — 1090F PRES/ABSN URINE INCON ASSESS: CPT | Performed by: NURSE PRACTITIONER

## 2022-05-10 PROCEDURE — 2022F DILAT RTA XM EVC RTNOPTHY: CPT | Performed by: NURSE PRACTITIONER

## 2022-05-10 PROCEDURE — 1123F ACP DISCUSS/DSCN MKR DOCD: CPT | Performed by: NURSE PRACTITIONER

## 2022-05-10 PROCEDURE — 82274 ASSAY TEST FOR BLOOD FECAL: CPT | Performed by: NURSE PRACTITIONER

## 2022-05-10 PROCEDURE — G8399 PT W/DXA RESULTS DOCUMENT: HCPCS | Performed by: NURSE PRACTITIONER

## 2022-05-10 PROCEDURE — 4040F PNEUMOC VAC/ADMIN/RCVD: CPT | Performed by: NURSE PRACTITIONER

## 2022-05-10 PROCEDURE — 3044F HG A1C LEVEL LT 7.0%: CPT | Performed by: NURSE PRACTITIONER

## 2022-05-10 PROCEDURE — G8427 DOCREV CUR MEDS BY ELIG CLIN: HCPCS | Performed by: NURSE PRACTITIONER

## 2022-05-10 PROCEDURE — G8417 CALC BMI ABV UP PARAM F/U: HCPCS | Performed by: NURSE PRACTITIONER

## 2022-05-10 RX ORDER — CHOLECALCIFEROL (VITAMIN D3) 125 MCG
CAPSULE ORAL
COMMUNITY
Start: 2022-05-03 | End: 2022-09-20 | Stop reason: SDUPTHER

## 2022-05-10 RX ORDER — ACYCLOVIR 200 MG/1
CAPSULE ORAL
Qty: 120 CAPSULE | Refills: 1 | Status: SHIPPED | OUTPATIENT
Start: 2022-05-10 | End: 2022-09-20 | Stop reason: SDUPTHER

## 2022-05-10 RX ORDER — FLUTICASONE PROPIONATE 50 MCG
1 SPRAY, SUSPENSION (ML) NASAL DAILY
Qty: 1 EACH | Refills: 2 | Status: SHIPPED | OUTPATIENT
Start: 2022-05-10

## 2022-05-10 RX ORDER — PRAVASTATIN SODIUM 40 MG
TABLET ORAL
Qty: 90 TABLET | Refills: 1 | Status: SHIPPED | OUTPATIENT
Start: 2022-05-10

## 2022-05-10 RX ORDER — AMITRIPTYLINE HYDROCHLORIDE 100 MG/1
100 TABLET, FILM COATED ORAL NIGHTLY
Qty: 90 TABLET | Refills: 1 | Status: SHIPPED | OUTPATIENT
Start: 2022-05-10

## 2022-05-10 RX ORDER — SERTRALINE HYDROCHLORIDE 100 MG/1
TABLET, FILM COATED ORAL
Qty: 90 TABLET | Refills: 1 | Status: SHIPPED | OUTPATIENT
Start: 2022-05-10 | End: 2022-09-20 | Stop reason: SDUPTHER

## 2022-05-10 RX ORDER — CYCLOBENZAPRINE HCL 5 MG
TABLET ORAL
Qty: 90 TABLET | Refills: 0 | Status: SHIPPED | OUTPATIENT
Start: 2022-05-10 | End: 2022-10-20 | Stop reason: SDUPTHER

## 2022-05-10 ASSESSMENT — PATIENT HEALTH QUESTIONNAIRE - PHQ9
8. MOVING OR SPEAKING SO SLOWLY THAT OTHER PEOPLE COULD HAVE NOTICED. OR THE OPPOSITE, BEING SO FIGETY OR RESTLESS THAT YOU HAVE BEEN MOVING AROUND A LOT MORE THAN USUAL: 1
9. THOUGHTS THAT YOU WOULD BE BETTER OFF DEAD, OR OF HURTING YOURSELF: 0
2. FEELING DOWN, DEPRESSED OR HOPELESS: 1
3. TROUBLE FALLING OR STAYING ASLEEP: 0
4. FEELING TIRED OR HAVING LITTLE ENERGY: 1
SUM OF ALL RESPONSES TO PHQ QUESTIONS 1-9: 5
10. IF YOU CHECKED OFF ANY PROBLEMS, HOW DIFFICULT HAVE THESE PROBLEMS MADE IT FOR YOU TO DO YOUR WORK, TAKE CARE OF THINGS AT HOME, OR GET ALONG WITH OTHER PEOPLE: 0
SUM OF ALL RESPONSES TO PHQ QUESTIONS 1-9: 5
SUM OF ALL RESPONSES TO PHQ QUESTIONS 1-9: 5
SUM OF ALL RESPONSES TO PHQ9 QUESTIONS 1 & 2: 2
7. TROUBLE CONCENTRATING ON THINGS, SUCH AS READING THE NEWSPAPER OR WATCHING TELEVISION: 1
SUM OF ALL RESPONSES TO PHQ QUESTIONS 1-9: 5
5. POOR APPETITE OR OVEREATING: 0
1. LITTLE INTEREST OR PLEASURE IN DOING THINGS: 1
6. FEELING BAD ABOUT YOURSELF - OR THAT YOU ARE A FAILURE OR HAVE LET YOURSELF OR YOUR FAMILY DOWN: 0

## 2022-05-10 ASSESSMENT — ENCOUNTER SYMPTOMS
COUGH: 0
VOMITING: 0
SHORTNESS OF BREATH: 0
NAUSEA: 0
WHEEZING: 0
DIARRHEA: 0
ABDOMINAL PAIN: 0
CONSTIPATION: 0

## 2022-05-10 NOTE — PATIENT INSTRUCTIONS
Please get your fasting lab work (no food or drink for 10-12 hours prior besides water) completed M-F 730a-4p at our office. [facility] lab has walk-in hours available as well - no appointment is needed. We will call or mychart message you with your results. Keep a log of home BP daily. Goal BP <130/80  Bring BP log to the next office visit.    schedule diabetic eye exam.      Patient Education        DASH Diet: Care Instructions  Your Care Instructions     The DASH diet is an eating plan that can help lower your blood pressure. DASH stands for Dietary Approaches to Stop Hypertension. Hypertension is high bloodpressure. The DASH diet focuses on eating foods that are high in calcium, potassium, and magnesium. These nutrients can lower blood pressure. The foods that are highest in these nutrients are fruits, vegetables, low-fat dairy products, nuts, seeds, and legumes. But taking calcium, potassium, and magnesium supplements instead of eating foods that are high in those nutrients does not have the same effect. The DASH diet also includes whole grains, fish, and poultry. The DASH diet is one of several lifestyle changes your doctor may recommend to lower your high blood pressure. Your doctor may also want you to decrease the amount of sodium in your diet. Lowering sodium while following the DASH dietcan lower blood pressure even further than just the DASH diet alone. Follow-up care is a key part of your treatment and safety. Be sure to make and go to all appointments, and call your doctor if you are having problems. It's also a good idea to know your test results and keep alist of the medicines you take. How can you care for yourself at home? Following the DASH diet   Eat 4 to 5 servings of fruit each day. A serving is 1 medium-sized piece of fruit, ½ cup chopped or canned fruit, 1/4 cup dried fruit, or 4 ounces (½ cup) of fruit juice. Choose fruit more often than fruit juice.    Eat 4 to 5 servings of vegetables each day. A serving is 1 cup of lettuce or raw leafy vegetables, ½ cup of chopped or cooked vegetables, or 4 ounces (½ cup) of vegetable juice. Choose vegetables more often than vegetable juice.  Get 2 to 3 servings of low-fat and fat-free dairy each day. A serving is 8 ounces of milk, 1 cup of yogurt, or 1 ½ ounces of cheese.  Eat 6 to 8 servings of grains each day. A serving is 1 slice of bread, 1 ounce of dry cereal, or ½ cup of cooked rice, pasta, or cooked cereal. Try to choose whole-grain products as much as possible.  Limit lean meat, poultry, and fish to 2 servings each day. A serving is 3 ounces, about the size of a deck of cards.  Eat 4 to 5 servings of nuts, seeds, and legumes (cooked dried beans, lentils, and split peas) each week. A serving is 1/3 cup of nuts, 2 tablespoons of seeds, or ½ cup of cooked beans or peas.  Limit fats and oils to 2 to 3 servings each day. A serving is 1 teaspoon of vegetable oil or 2 tablespoons of salad dressing.  Limit sweets and added sugars to 5 servings or less a week. A serving is 1 tablespoon jelly or jam, ½ cup sorbet, or 1 cup of lemonade.  Eat less than 2,300 milligrams (mg) of sodium a day. If you limit your sodium to 1,500 mg a day, you can lower your blood pressure even more.  Be aware that all of these are the suggested number of servings for people who eat 1,800 to 2,000 calories a day. Your recommended number of servings may be different if you need more or fewer calories. Tips for success   Start small. Do not try to make dramatic changes to your diet all at once. You might feel that you are missing out on your favorite foods and then be more likely to not follow the plan. Make small changes, and stick with them. Once those changes become habit, add a few more changes.  Try some of the following:  ? Make it a goal to eat a fruit or vegetable at every meal and at snacks.  This will make it easy to get the recommended amount of fruits and vegetables each day. ? Try yogurt topped with fruit and nuts for a snack or healthy dessert. ? Add lettuce, tomato, cucumber, and onion to sandwiches. ? Combine a ready-made pizza crust with low-fat mozzarella cheese and lots of vegetable toppings. Try using tomatoes, squash, spinach, broccoli, carrots, cauliflower, and onions. ? Have a variety of cut-up vegetables with a low-fat dip as an appetizer instead of chips and dip. ? Sprinkle sunflower seeds or chopped almonds over salads. Or try adding chopped walnuts or almonds to cooked vegetables. ? Try some vegetarian meals using beans and peas. Add garbanzo or kidney beans to salads. Make burritos and tacos with mashed mcclure beans or black beans. Where can you learn more? Go to https://CH4epeShootHome.MetaStat. org and sign in to your [employer] account. Enter Z323 in the Lamiecco box to learn more about \"DASH Diet: Care Instructions. \"     If you do not have an account, please click on the \"Sign Up Now\" link. Current as of: January 10, 2022               Content Version: 13.2  © 1834-6647 [employer]wise, Incorporated. Care instructions adapted under license by [facility] ([facility]). If you have questions about a medical condition or this instruction, always ask your healthcare professional. [place] [address] any warranty or liability for your use of this information.

## 2022-05-10 NOTE — PROGRESS NOTES
Office Visit  5/10/2022    Subjective:  Chief Complaint   Patient presents with    Follow-up    Hypertension    Mood Swings     HPI:   Juvencio Conrad is a 74 y.o. female who presents to the clinic today for follow up. T1DM- She follows with endocrine - Dr. Ismael Jones. Walking for exercise. Diet is reported as \"it's pretty good. \"    HTN- using lifestyle modifications. Taking BP at home- can't recall readings. Asymptomatic. Denies chest pain, palpitations, shortness of breath, trouble breathing, lightheadedness, dizziness or blurred vision.     Depression/PTSD/Anxiety-Pt is taking Zoloft 100 mg daily and amitriptyline 100 mg nightly. States her mood is doing \"a lot better. \" Denies SI/HI. Seeing psychology.      HLD- Allergy to atorvastatin. Taking pravastatin 40 mg without concerns/without side effects.     Genital herpes- Pt reports that she takes acyclovir 400 mg once nightly- prescribed BID. She states she increases the dose with a flare- no recent flare.     Vit D deficiency- taking supplements daily. Asymptomatic.      Allergic rhinitis- well controlled with flonase. No side effects.     Low back pains- seeing ortho. Performed PT. Getting epidurals. Seeing Dr. Jefferson Fatima - states she is unsure if this is for osteoporosis or osteopenia.     Right shoulder pains- present for years. Takes flexeril PRN-typically once nightly. States she is not interested in cutting down- has been on this regimen for years. Review of Systems   Constitutional: Negative for chills, fatigue, fever and unexpected weight change. Eyes: Negative for visual disturbance. Respiratory: Negative for cough, shortness of breath and wheezing. Cardiovascular: Negative for chest pain, palpitations and leg swelling. Gastrointestinal: Negative for abdominal pain, constipation, diarrhea, nausea and vomiting. Skin: Negative for pallor and rash.    Neurological: Negative for dizziness, weakness, light-headedness, numbness and headaches. Psychiatric/Behavioral: Negative for dysphoric mood, self-injury, sleep disturbance and suicidal ideas. The patient is not nervous/anxious. Allergies   Allergen Reactions    Atorvastatin Swelling    Lisinopril Other (See Comments)    Naproxen Nausea Only    Oxycodone-Acetaminophen Other (See Comments)       Current Outpatient Rx   Medication Sig Dispense Refill    Calcium-Magnesium-Zinc 333-133-5 MG TABS Take 1 tablet by mouth daily      fluticasone (FLONASE) 50 MCG/ACT nasal spray 1 spray by Each Nostril route daily 1 each 2    amitriptyline (ELAVIL) 100 MG tablet Take 1 tablet by mouth nightly 90 tablet 1    sertraline (ZOLOFT) 100 MG tablet TAKE 1 TABLET BY MOUTH ONCE DAILY 90 tablet 1    pravastatin (PRAVACHOL) 40 MG tablet Take 1 tablet by mouth once daily 90 tablet 1    cyclobenzaprine (FLEXERIL) 5 MG tablet TAKE 1 TABLET BY MOUTH NIGHTLY AS NEEDED FOR MUSCLE SPASM 90 tablet 0    acyclovir (ZOVIRAX) 200 MG capsule Take 2 capsules by mouth twice daily 120 capsule 1    lidocaine (LIDODERM) 5 % Place 1 patch onto the skin every 12 hours 12 hours on, 12 hours off.   Max 3 patches at a time 30 patch 0    celecoxib (CELEBREX) 200 MG capsule Take 1 capsule by mouth daily as needed for Pain 30 capsule 3    Cholecalciferol (VITAMIN D3) 25 MCG (1000 UT) CAPS Take 2 capsules by mouth daily 180 capsule 1    insulin glargine (LANTUS) 100 UNIT/ML injection vial Inject 50 Units into the skin every morning      Calcium-Vitamins C & D (CALCIUM/C/D PO) Take by mouth      Multiple Vitamins-Minerals (MULTI-AFIA PO) Take by mouth      Glucosamine-MSM-Hyaluronic Acd (JOINT HEALTH PO) Take by mouth      aspirin 81 MG tablet Take 81 mg by mouth daily      insulin lispro (HUMALOG) 100 UNIT/ML injection vial Inject 15 Units into the skin 3 times daily (before meals) Indications: with sliding scale Sliding scal   <200 = 0 units   221-250 = 1 unit  251-300 = 2 units   301 - 350 = 3 units   351-400= 4 units   401-430 = 5 units      Cholecalciferol (VITAMIN D3) 50 MCG (2000 UT) TABS TAKE 2 TABLETS BY MOUTH ONCE DAILY WITH BREAKFAST         Patient Active Problem List   Diagnosis    Anxiety and depression    PTSD (post-traumatic stress disorder)    Type 2 diabetes mellitus without complication, with long-term current use of insulin ([facility] [address]    Essential hypertension    Genital herpes simplex    Hyperlipidemia        Wt Readings from Last 3 Encounters:   05/10/22 155 lb (70.3 kg)   02/23/22 156 lb 1.4 oz (70.8 kg)   11/30/21 156 lb 1.4 oz (70.8 kg)     BP Readings from Last 3 Encounters:   05/10/22 130/70   11/09/21 138/70   05/07/21 110/60     The 10-year ASCVD risk score (Alice Pollard et al., 2013) is: 10.9%    Values used to calculate the score:      Age: 74 years      Sex: Female      Is Non- : No      Diabetic: Yes      Tobacco smoker: No      Systolic Blood Pressure: 187 mmHg      Is BP treated: No      HDL Cholesterol: 47 mg/dL      Total Cholesterol: 179 mg/dL    PHQ-9 Total Score: 5 (5/10/2022  4:23 PM)  Thoughts that you would be better off dead, or of hurting yourself in some way: 0 (5/10/2022  4:23 PM)    Vitals 5/10/2022 5/10/2022 1/62/6584   SYSTOLIC 580 259 211   DIASTOLIC 70 70 68     Objective/Physical Exam:  /70   Pulse 97   Ht 4' (1.219 m)   Wt 155 lb (70.3 kg)   SpO2 98%   BMI 47.30 kg/m²   Body mass index is 47.3 kg/m². Physical Exam  Vitals reviewed. Constitutional:       General: She is not in acute distress. Appearance: She is well-developed. She is not diaphoretic. HENT:      Head: Normocephalic and atraumatic. Eyes:      Pupils: Pupils are equal, round, and reactive to light. Cardiovascular:      Rate and Rhythm: Normal rate and regular rhythm. Pulmonary:      Effort: Pulmonary effort is normal. No respiratory distress. Breath sounds: Normal breath sounds. No wheezing or rales. Chest:      Chest wall: No tenderness.    Abdominal: General: Bowel sounds are normal.      Palpations: Abdomen is soft. Skin:     General: Skin is warm and dry. Neurological:      Mental Status: She is alert and oriented to person, place, and time. Coordination: Coordination normal.   Psychiatric:         Mood and Affect: Mood normal.       Assessment and Plan:  Gage Gottlieb was seen today for follow-up, hypertension and mood swings. Diagnoses and all orders for this visit:    At high risk for falls   - Safety reviewed. Type 1 diabetes mellitus without complication (HCC)   - Continue with endocrinology   - States she is calling to schedule diabetic eye exam.    Essential hypertension   - Chronic. Uncontrolled. - Using lifestyle modifications   - Blood pressure elevated today. Reviewed goal blood pressure with the patient. Patient reports she thinks this was due to a stressful day. - Lifestyle modifications such as exercise, weight loss and healthy diet encouraged and reviewed with the pt. - Patient education handout on the DASH diet provided and reviewed with the patient. - Patient states she takes her blood pressure at home but she did not bring the BP log.   - Recommend patient take blood pressure daily and keep a daily log to bring to her next visit in 1 month. She will call with elevated readings sooner  - Red flag warning signs reviewed with the pt and she will go to the ER if these occur. Anxiety and depression/PTSD (post-traumatic stress disorder)  -    Mood well controlled on the current regimen per patient. PHQ-9 is 5. Denies suicidal or homicidal ideation.  - Continue current regimen  - amitriptyline (ELAVIL) 100 MG tablet; Take 1 tablet by mouth nightly  -     sertraline (ZOLOFT) 100 MG tablet; TAKE 1 TABLET BY MOUTH ONCE DAILY    Mixed hyperlipidemia  -    Takes medication as prescribed without side effects. Asymptomatic. Will re-evaluate. - Continue current regimen  - pravastatin (PRAVACHOL) 40 MG tablet;  Take 1 tablet by mouth once daily  -     Lipid, Fasting; Future    Genital herpes simplex, unspecified site  -    No recent flares. Asymptomatic. Denies side effects.  - Continue current regimen  - acyclovir (ZOVIRAX) 200 MG capsule; Take 2 capsules by mouth twice daily    Vitamin D deficiency  -     taking supplements. Will reevaluate  - Vitamin D 25 Hydroxy; Future    Seasonal allergic rhinitis due to other allergic trigger  -    Well-controlled on current regimen without side effects.  - fluticasone (FLONASE) 50 MCG/ACT nasal spray; 1 spray by Each Nostril route daily    Lumbar pain   - Continue with ortho    Chronic right shoulder pain  -    Well-controlled with current regimen without side effects. She would like to continue on the current dose of medication. She denies side effects.  - Continue current regimen as needed  -  cyclobenzaprine (FLEXERIL) 5 MG tablet; TAKE 1 TABLET BY MOUTH NIGHTLY AS NEEDED FOR MUSCLE SPASM    Screen for colon cancer/Positive FIT (fecal immunochemical test)  -     POCT Fecal Immunochemical Test (FIT)- positive - reviewed with the pt. All questions answered  - Colonoscopy ordered. -     Harry Ramos MD, Gastroenterology, [facility]    Vaccine counseling   - CDC guidelines regarding vaccines reviewed with the patient. Has mammogram scheduled. Return in about 4 weeks (around 6/7/2022) for BP f/u and 6 months for annual physical exam or sooner if needed. Pt will call if symptoms worsen or fail to improve. All questions answered. Pt states no further questions or concerns at this time. Electronically signed by: BHAVANA Blair CNP 05/10/22    On the basis of positive falls risk screening, assessment and plan is as follows: home safety tips provided.

## 2022-05-23 ENCOUNTER — OFFICE VISIT (OUTPATIENT)
Dept: ORTHOPEDIC SURGERY | Age: 67
End: 2022-05-23
Payer: MEDICAID

## 2022-05-23 VITALS — BODY MASS INDEX: 35.87 KG/M2 | HEIGHT: 55 IN | WEIGHT: 154.98 LBS

## 2022-05-23 DIAGNOSIS — M25.561 RIGHT KNEE PAIN, UNSPECIFIED CHRONICITY: Primary | ICD-10-CM

## 2022-05-23 DIAGNOSIS — S86.111A GASTROCNEMIUS STRAIN, RIGHT, INITIAL ENCOUNTER: ICD-10-CM

## 2022-05-23 PROCEDURE — G8417 CALC BMI ABV UP PARAM F/U: HCPCS | Performed by: INTERNAL MEDICINE

## 2022-05-23 PROCEDURE — G8428 CUR MEDS NOT DOCUMENT: HCPCS | Performed by: INTERNAL MEDICINE

## 2022-05-23 PROCEDURE — G8399 PT W/DXA RESULTS DOCUMENT: HCPCS | Performed by: INTERNAL MEDICINE

## 2022-05-23 PROCEDURE — 1123F ACP DISCUSS/DSCN MKR DOCD: CPT | Performed by: INTERNAL MEDICINE

## 2022-05-23 PROCEDURE — 99213 OFFICE O/P EST LOW 20 MIN: CPT | Performed by: INTERNAL MEDICINE

## 2022-05-23 PROCEDURE — 1090F PRES/ABSN URINE INCON ASSESS: CPT | Performed by: INTERNAL MEDICINE

## 2022-05-23 PROCEDURE — 3017F COLORECTAL CA SCREEN DOC REV: CPT | Performed by: INTERNAL MEDICINE

## 2022-05-23 PROCEDURE — 1036F TOBACCO NON-USER: CPT | Performed by: INTERNAL MEDICINE

## 2022-05-24 NOTE — PROGRESS NOTES
Chief Complaint:   Chief Complaint   Patient presents with    Leg Pain     R hamstring, leg gave out on  while coming out of the shower and she fell on her buttock, she is feeling a lot better today and complains of no pain, kept appointment just to be sure. History of Present Illness:       Patient is a 74 y.o. female presents with the above complaint. The symptoms began 10 daysago and started with an injury. Initial event related to tripped over a stool. The more recent aggravation related to falling to the floor attempting to don her pants while weightbearing on the left lower extremity. The patient describes a sharp pain that does not radiate. The symptoms are intermittent  and are are improving since the onset. Pain localizes to the calf and  is not predictably aggravated by weightbearing. There are not mechanical symptoms associated with the symptoms. There are notneuritic symptoms involving the foot or ankle. The patient denies subjective instability about the foot or ankle and denies new onset or progressive weakness of the lower extremity. Pain level 0    The patient denies a pattern of activity related swelling. Treatment to date:ice/heat/NSAIDs with good relief      There is no no prior history of foot or ankle trauma. There is no prior history of autoimmune disease, crystal arthropathy, or crystal arthropathy.        Past Medical History:        Past Medical History:   Diagnosis Date    Anxiety     Depression     Diabetes mellitus ([address]     Genital herpes     Hyperlipidemia     Hypertension     PTSD (post-traumatic stress disorder)     Vitamin D deficiency          Past Surgical History:   Procedure Laterality Date    CARPAL TUNNEL RELEASE Bilateral     CATARACT REMOVAL Bilateral     2     SECTION      FINGER TRIGGER RELEASE      7 of them    LEG SURGERY Left     screw and plates from brake    TONSILLECTOMY           Present Medications: Current Outpatient Medications   Medication Sig Dispense Refill    Calcium-Magnesium-Zinc 333-133-5 MG TABS Take 1 tablet by mouth daily      Cholecalciferol (VITAMIN D3) 50 MCG (2000 UT) TABS TAKE 2 TABLETS BY MOUTH ONCE DAILY WITH BREAKFAST      fluticasone (FLONASE) 50 MCG/ACT nasal spray 1 spray by Each Nostril route daily 1 each 2    amitriptyline (ELAVIL) 100 MG tablet Take 1 tablet by mouth nightly 90 tablet 1    sertraline (ZOLOFT) 100 MG tablet TAKE 1 TABLET BY MOUTH ONCE DAILY 90 tablet 1    pravastatin (PRAVACHOL) 40 MG tablet Take 1 tablet by mouth once daily 90 tablet 1    cyclobenzaprine (FLEXERIL) 5 MG tablet TAKE 1 TABLET BY MOUTH NIGHTLY AS NEEDED FOR MUSCLE SPASM 90 tablet 0    acyclovir (ZOVIRAX) 200 MG capsule Take 2 capsules by mouth twice daily 120 capsule 1    lidocaine (LIDODERM) 5 % Place 1 patch onto the skin every 12 hours 12 hours on, 12 hours off. Max 3 patches at a time 30 patch 0    celecoxib (CELEBREX) 200 MG capsule Take 1 capsule by mouth daily as needed for Pain 30 capsule 3    Cholecalciferol (VITAMIN D3) 25 MCG (1000 UT) CAPS Take 2 capsules by mouth daily 180 capsule 1    insulin glargine (LANTUS) 100 UNIT/ML injection vial Inject 50 Units into the skin every morning      Calcium-Vitamins C & D (CALCIUM/C/D PO) Take by mouth      Multiple Vitamins-Minerals (MULTI-AFIA PO) Take by mouth      Glucosamine-MSM-Hyaluronic Acd (JOINT HEALTH PO) Take by mouth      aspirin 81 MG tablet Take 81 mg by mouth daily      insulin lispro (HUMALOG) 100 UNIT/ML injection vial Inject 15 Units into the skin 3 times daily (before meals) Indications: with sliding scale Sliding scal   <200 = 0 units   221-250 = 1 unit  251-300 = 2 units   301 - 350 = 3 units   351-400= 4 units   401-430 = 5 units       No current facility-administered medications for this visit. Allergies:         Allergies   Allergen Reactions    Atorvastatin Swelling    Lisinopril Other (See Comments)    Naproxen Nausea Only    Oxycodone-Acetaminophen Other (See Comments)        Social History:         Social History     Socioeconomic History    Marital status: Legally      Spouse name: Not on file    Number of children: Not on file    Years of education: Not on file    Highest education level: Not on file   Occupational History    Not on file   Tobacco Use    Smoking status: Never Smoker    Smokeless tobacco: Never Used   Vaping Use    Vaping Use: Never used   Substance and Sexual Activity    Alcohol use: Not Currently    Drug use: Never    Sexual activity: Not Currently   Other Topics Concern    Not on file   Social History Narrative    Moved from [place] to [place]. Has a daughter, son-in-law and grandchildren live with her. Social Determinants of Health     Financial Resource Strain: Low Risk     Difficulty of Paying Living Expenses: Not hard at all   Food Insecurity: No Food Insecurity    Worried About Running Out of Food in the Last Year: Never true    Nico of Food in the Last Year: Never true   Transportation Needs:     Lack of Transportation (Medical): Not on file    Lack of Transportation (Non-Medical):  Not on file   Physical Activity:     Days of Exercise per Week: Not on file    Minutes of Exercise per Session: Not on file   Stress:     Feeling of Stress : Not on file   Social Connections:     Frequency of Communication with Friends and Family: Not on file    Frequency of Social Gatherings with Friends and Family: Not on file    Attends Congregation Services: Not on file    Active Member of Clubs or Organizations: Not on file    Attends Club or Organization Meetings: Not on file    Marital Status: Not on file   Intimate Partner Violence:     Fear of Current or Ex-Partner: Not on file    Emotionally Abused: Not on file    Physically Abused: Not on file    Sexually Abused: Not on file   Housing Stability:     Unable to Pay for Housing in the Last Year: Not on file    Number of Places Lived in the Last Year: Not on file    Unstable Housing in the Last Year: Not on file        Review of Symptoms:    Pertinent items are noted in HPI    10 point review of systems negative except as mentioned in HPI        Vital Signs: There were no vitals filed for this visit. General Exam:     Constitutional: Patient is adequately groomed with no evidence of malnutrition  Mental Status: The patient is oriented to time, place and person. The patient's mood and affect are appropriate. Vascular: Examination reveals no swelling or calf tenderness. Peripheral pulses are palpable and 2+. Lymphatics: no lymphadenopathy of the inguinal region or lower extremity      Physical Exam: right leg      Primary Exam:    Inspection: No deformity atrophy appreciable curvature      Palpation: No focal tenderness no palpable rent or defect of the calf musculature      Range of Motion: Full range and symmetric at the ankle      Strength: Normal isometric resisted dorsiflexion and plantarflexion without pain      Special Tests: Negative SLR      Skin: There are no rashes, ulcerations or lesions. Gait: Nonantalgic     Neurovascular - non focal and intact       Additional Comments:        Additional Examinations:           Left Lower Extremity: Examination of the left lower extremity does not show any tenderness, deformity or injury. Range of motion is unremarkable. There is no gross instability. There are no rashes, ulcerations or lesions. Strength and tone are normal.   Neurolgic -Light touch sensation and manual muscle testing normal L2-S1. No fasiculations. Pattella tendon and Achilles tendon reflexes +2 bilaterally. Seated SLR negative        Office Imaging Results/Procedures PerformedToday:             Office Procedures:   No orders of the defined types were placed in this encounter. Other Outside Imaging and Testing Personally Reviewed:    No results found. Assessment   Impression: . Encounter Diagnoses   Name Primary?  Gastrocnemius strain, right, initial encounter     Right knee pain, unspecified chronicity Yes      Subacute gastrocnemius strain resolving        Plan:     Open chain Achilles tendon stretching home program  Local measures inclusive of heat and activity modification caution against overuse  Consider formal course of PT as needed  Celebrex daily as needed with GI precaution      The nature of the finding, probable diagnosis and likely treatment was thoroughly discussed with the patient. The options, risks, complications, alternative treatment as well as some of the differential diagnosis was discussed. The patient was thoroughly informed and all questions were answered. the patient indicated understanding and satisfaction with the discussion. Orders:      No orders of the defined types were placed in this encounter. Disclaimer: \"This note was dictated with voice recognition software. Though review and correction are routine, we apologize for any errors. \"

## 2022-05-31 ENCOUNTER — TELEPHONE (OUTPATIENT)
Dept: ORTHOPEDIC SURGERY | Age: 67
End: 2022-05-31

## 2022-05-31 DIAGNOSIS — M48.061 DEGENERATIVE LUMBAR SPINAL STENOSIS: Primary | ICD-10-CM

## 2022-05-31 RX ORDER — HYDROCODONE BITARTRATE AND ACETAMINOPHEN 5; 325 MG/1; MG/1
1 TABLET ORAL EVERY 6 HOURS PRN
Qty: 15 TABLET | Refills: 0 | Status: SHIPPED | OUTPATIENT
Start: 2022-05-31 | End: 2022-06-30

## 2022-05-31 NOTE — TELEPHONE ENCOUNTER
Prescription Refill     Medication Name:  HYDROCODONE 5/325  Pharmacy: [address]  Patient Contact Number:  [phone]    PATIENT WAS SEEN 5/23/22 AND STATES SHE WAS TO GET REFILL OF HYDROCODONE 5/325. IS REQ A RETURN CALL REGARDING THIS. PLEASE CONTACT AT ABOVE NUMBER.

## 2022-05-31 NOTE — TELEPHONE ENCOUNTER
S/W pt, she was under the impression that pain meds would be prescribed for her to use on a prn basis. Note states to continue Celebrex daily, but there is no mention of pain medication. Pt states that she doesn't feel the need for it, as she is managing fine without it at this time. I advised pt that if she feels that her pain is worsening, to give us a call back if she is unable to manage pain with Celebrex or OTC medications. Pt v/u.

## 2022-05-31 NOTE — TELEPHONE ENCOUNTER
A few tablets for use prn for her back condition is fine. Sent E-scribe    Please reference the chart related to last time norco was prescribed and the conditions that were being treated at that time. She should have told you in was for her back condition.     Thanks

## 2022-06-01 ENCOUNTER — TELEPHONE (OUTPATIENT)
Dept: ORTHOPEDIC SURGERY | Age: 67
End: 2022-06-01

## 2022-06-01 NOTE — TELEPHONE ENCOUNTER
Medical Facility Question     Facility Name: [address] [place] [address] Name: Lorena Su Number: [phone]  Request or Information: Jesus Cueva - PATIENT HAS NEVER HAD IT - NEEDS HISTORY OF PAIN LEVEL - HAS PATIENT FAILED OTHER PAIN THERAPIES. PLEASE ADVISE.

## 2022-06-21 DIAGNOSIS — E55.9 VITAMIN D DEFICIENCY: ICD-10-CM

## 2022-06-21 DIAGNOSIS — E78.2 MIXED HYPERLIPIDEMIA: ICD-10-CM

## 2022-06-21 LAB
CHOLESTEROL, FASTING: 197 MG/DL (ref 0–199)
HDLC SERPL-MCNC: 58 MG/DL (ref 40–60)
LDL CHOLESTEROL CALCULATED: 118 MG/DL
TRIGLYCERIDE, FASTING: 107 MG/DL (ref 0–150)
VITAMIN D 25-HYDROXY: 42.7 NG/ML
VLDLC SERPL CALC-MCNC: 21 MG/DL

## 2022-06-27 ENCOUNTER — HOSPITAL ENCOUNTER (OUTPATIENT)
Dept: WOMENS IMAGING | Age: 67
Discharge: HOME | End: 2022-06-27
Payer: MEDICAID

## 2022-06-27 VITALS — WEIGHT: 155 LBS | BODY MASS INDEX: 31.25 KG/M2 | HEIGHT: 59 IN

## 2022-06-27 DIAGNOSIS — Z12.31 BREAST CANCER SCREENING BY MAMMOGRAM: ICD-10-CM

## 2022-06-27 PROCEDURE — 77063 BREAST TOMOSYNTHESIS BI: CPT

## 2022-06-28 ENCOUNTER — TELEPHONE (OUTPATIENT)
Dept: WOMENS IMAGING | Age: 67
End: 2022-06-28

## 2022-06-28 ENCOUNTER — OFFICE VISIT (OUTPATIENT)
Dept: INTERNAL MEDICINE CLINIC | Age: 67
End: 2022-06-28
Payer: MEDICAID

## 2022-06-28 VITALS
DIASTOLIC BLOOD PRESSURE: 70 MMHG | OXYGEN SATURATION: 98 % | BODY MASS INDEX: 31.55 KG/M2 | SYSTOLIC BLOOD PRESSURE: 130 MMHG | HEART RATE: 97 BPM | WEIGHT: 156.2 LBS

## 2022-06-28 DIAGNOSIS — R92.8 ABNORMAL MAMMOGRAM: Primary | ICD-10-CM

## 2022-06-28 DIAGNOSIS — I10 ESSENTIAL HYPERTENSION: Primary | ICD-10-CM

## 2022-06-28 PROCEDURE — 1123F ACP DISCUSS/DSCN MKR DOCD: CPT | Performed by: NURSE PRACTITIONER

## 2022-06-28 PROCEDURE — 1036F TOBACCO NON-USER: CPT | Performed by: NURSE PRACTITIONER

## 2022-06-28 PROCEDURE — G8427 DOCREV CUR MEDS BY ELIG CLIN: HCPCS | Performed by: NURSE PRACTITIONER

## 2022-06-28 PROCEDURE — 99213 OFFICE O/P EST LOW 20 MIN: CPT | Performed by: NURSE PRACTITIONER

## 2022-06-28 PROCEDURE — 1090F PRES/ABSN URINE INCON ASSESS: CPT | Performed by: NURSE PRACTITIONER

## 2022-06-28 PROCEDURE — G8417 CALC BMI ABV UP PARAM F/U: HCPCS | Performed by: NURSE PRACTITIONER

## 2022-06-28 PROCEDURE — G8399 PT W/DXA RESULTS DOCUMENT: HCPCS | Performed by: NURSE PRACTITIONER

## 2022-06-28 PROCEDURE — 3017F COLORECTAL CA SCREEN DOC REV: CPT | Performed by: NURSE PRACTITIONER

## 2022-06-28 ASSESSMENT — ENCOUNTER SYMPTOMS
SHORTNESS OF BREATH: 0
ABDOMINAL PAIN: 0
NAUSEA: 0
VOMITING: 0
WHEEZING: 0
DIARRHEA: 0
COUGH: 0
CONSTIPATION: 0

## 2022-06-28 NOTE — PROGRESS NOTES
Office Visit  6/28/2022    Subjective:  Chief Complaint   Patient presents with    Follow-up    Hypertension     Has home BP readings     HPI:  Reno Gamble is a 74 y.o. female who presents to the clinic today for follow up. HTN- using lifestyle modifications. Diet is \"not as good as it was. \" States she is eat eating more salt than normal- a lot of \"bologna\" per pt. No formal exercise. Taking BP at home- 103-140s/70-80s. Asymptomatic. Denies chest pain, palpitations, shortness of breath, trouble breathing, lightheadedness, dizziness or blurred vision. Review of Systems   Constitutional: Negative for chills, fatigue, fever and unexpected weight change. Eyes: Negative for visual disturbance. Respiratory: Negative for cough, shortness of breath and wheezing. Cardiovascular: Negative for chest pain, palpitations and leg swelling. Gastrointestinal: Negative for abdominal pain, constipation, diarrhea, nausea and vomiting. Skin: Negative for pallor and rash. Neurological: Negative for dizziness, weakness, light-headedness, numbness and headaches. Psychiatric/Behavioral: Negative for dysphoric mood, self-injury, sleep disturbance and suicidal ideas. The patient is not nervous/anxious. Allergies   Allergen Reactions    Atorvastatin Swelling    Lisinopril Other (See Comments)    Naproxen Nausea Only    Oxycodone-Acetaminophen Other (See Comments)     Current Outpatient Rx   Medication Sig Dispense Refill    HYDROcodone-acetaminophen (NORCO) 5-325 MG per tablet Take 1 tablet by mouth every 6 hours as needed for Pain (mod to severe) for up to 30 days.  15 tablet 0    Calcium-Magnesium-Zinc 333-133-5 MG TABS Take 1 tablet by mouth daily      Cholecalciferol (VITAMIN D3) 50 MCG (2000 UT) TABS TAKE 2 TABLETS BY MOUTH ONCE DAILY WITH BREAKFAST      fluticasone (FLONASE) 50 MCG/ACT nasal spray 1 spray by Each Nostril route daily 1 each 2    amitriptyline (ELAVIL) 100 MG tablet Take 1 tablet by mouth nightly 90 tablet 1    sertraline (ZOLOFT) 100 MG tablet TAKE 1 TABLET BY MOUTH ONCE DAILY 90 tablet 1    pravastatin (PRAVACHOL) 40 MG tablet Take 1 tablet by mouth once daily 90 tablet 1    cyclobenzaprine (FLEXERIL) 5 MG tablet TAKE 1 TABLET BY MOUTH NIGHTLY AS NEEDED FOR MUSCLE SPASM 90 tablet 0    acyclovir (ZOVIRAX) 200 MG capsule Take 2 capsules by mouth twice daily 120 capsule 1    lidocaine (LIDODERM) 5 % Place 1 patch onto the skin every 12 hours 12 hours on, 12 hours off.   Max 3 patches at a time 30 patch 0    celecoxib (CELEBREX) 200 MG capsule Take 1 capsule by mouth daily as needed for Pain 30 capsule 3    Cholecalciferol (VITAMIN D3) 25 MCG (1000 UT) CAPS Take 2 capsules by mouth daily 180 capsule 1    insulin glargine (LANTUS) 100 UNIT/ML injection vial Inject 50 Units into the skin every morning      Calcium-Vitamins C & D (CALCIUM/C/D PO) Take by mouth      Multiple Vitamins-Minerals (MULTI-AFIA PO) Take by mouth      Glucosamine-MSM-Hyaluronic Acd (JOINT HEALTH PO) Take by mouth      aspirin 81 MG tablet Take 81 mg by mouth daily      insulin lispro (HUMALOG) 100 UNIT/ML injection vial Inject 15 Units into the skin 3 times daily (before meals) Indications: with sliding scale Sliding scal   <200 = 0 units   221-250 = 1 unit  251-300 = 2 units   301 - 350 = 3 units   351-400= 4 units   401-430 = 5 units         Patient Active Problem List   Diagnosis    Anxiety and depression    PTSD (post-traumatic stress disorder)    Type 2 diabetes mellitus without complication, with long-term current use of insulin ([facility])    Essential hypertension    Genital herpes simplex    Hyperlipidemia     Wt Readings from Last 3 Encounters:   06/28/22 156 lb 3.2 oz (70.9 kg)   06/27/22 155 lb (70.3 kg)   05/23/22 154 lb 15.7 oz (70.3 kg)     BP Readings from Last 3 Encounters:   06/28/22 130/70   05/10/22 130/70   11/09/21 138/70     Objective/Physical Exam:  /70 (Site: Left Upper Arm)   Pulse 97   Wt 156 lb 3.2 oz (70.9 kg)   SpO2 98%   BMI 31.55 kg/m²   Body mass index is 31.55 kg/m². Physical Exam  Vitals reviewed. Constitutional:       General: She is not in acute distress. Appearance: She is well-developed. She is not diaphoretic. HENT:      Head: Normocephalic and atraumatic. Eyes:      Pupils: Pupils are equal, round, and reactive to light. Cardiovascular:      Rate and Rhythm: Normal rate and regular rhythm. Pulmonary:      Effort: Pulmonary effort is normal. No respiratory distress. Breath sounds: Normal breath sounds. No wheezing or rales. Chest:      Chest wall: No tenderness. Skin:     General: Skin is warm and dry. Neurological:      Mental Status: She is alert and oriented to person, place, and time. Coordination: Coordination normal.       Assessment and Plan:  Kerwin Lowe was seen today for follow-up and hypertension. Diagnoses and all orders for this visit:    Essential hypertension   - Reviewed home BP log-BP borderline at home. Stable today. - Recommend pt increase lifestyle modifications and continue to monitor BP at home and call with elevated readings. Goal BP reviewed. - Lifestyle modifications such as exercise, weight loss and healthy diet encouraged and reviewed with the pt. - DASH diet again reviewed with the pt. - Strongly encouraged increase exercise. - Pt will call if symptoms worsen or fail to improve  - Red flag warning signs reviewed with the pt and she will go to the ER if these occur. Strict protocol reviewed. Patient due for diabetic foot exam.  Reviewed completing this today- pt states she has a podiatrist who performs this. Also due for diabetic eye exam and states she will schedule an appointment with her eye doctor. Return in about 20 weeks (around 11/15/2022) for annual physical exam, or sooner if needed. Pt will call if symptoms worsen or fail to improve. All questions answered.  Pt states no further questions or concerns at this time.    Electronically signed by: BHAVANA hPelan - GLORIA 06/28/22

## 2022-06-28 NOTE — PATIENT INSTRUCTIONS
Goal BP <130/80    Schedule diabetic foot exam and eye exam.      Patient Education   DASH Diet: Care Instructions  Your Care Instructions     The DASH diet is an eating plan that can help lower your blood pressure. DASH stands for Dietary Approaches to Stop Hypertension. Hypertension is high bloodpressure. The DASH diet focuses on eating foods that are high in calcium, potassium, and magnesium. These nutrients can lower blood pressure. The foods that are highest in these nutrients are fruits, vegetables, low-fat dairy products, nuts, seeds, and legumes. But taking calcium, potassium, and magnesium supplements instead of eating foods that are high in those nutrients does not have the same effect. The DASH diet also includes whole grains, fish, and poultry. The DASH diet is one of several lifestyle changes your doctor may recommend to lower your high blood pressure. Your doctor may also want you to decrease the amount of sodium in your diet. Lowering sodium while following the DASH dietcan lower blood pressure even further than just the DASH diet alone. Follow-up care is a key part of your treatment and safety. Be sure to make and go to all appointments, and call your doctor if you are having problems. It's also a good idea to know your test results and keep alist of the medicines you take. How can you care for yourself at home? Following the DASH diet   Eat 4 to 5 servings of fruit each day. A serving is 1 medium-sized piece of fruit, ½ cup chopped or canned fruit, 1/4 cup dried fruit, or 4 ounces (½ cup) of fruit juice. Choose fruit more often than fruit juice.  Eat 4 to 5 servings of vegetables each day. A serving is 1 cup of lettuce or raw leafy vegetables, ½ cup of chopped or cooked vegetables, or 4 ounces (½ cup) of vegetable juice. Choose vegetables more often than vegetable juice.  Get 2 to 3 servings of low-fat and fat-free dairy each day.  A serving is 8 ounces of milk, 1 cup of yogurt, or 1 ½ ounces of cheese.  Eat 6 to 8 servings of grains each day. A serving is 1 slice of bread, 1 ounce of dry cereal, or ½ cup of cooked rice, pasta, or cooked cereal. Try to choose whole-grain products as much as possible.  Limit lean meat, poultry, and fish to 2 servings each day. A serving is 3 ounces, about the size of a deck of cards.  Eat 4 to 5 servings of nuts, seeds, and legumes (cooked dried beans, lentils, and split peas) each week. A serving is 1/3 cup of nuts, 2 tablespoons of seeds, or ½ cup of cooked beans or peas.  Limit fats and oils to 2 to 3 servings each day. A serving is 1 teaspoon of vegetable oil or 2 tablespoons of salad dressing.  Limit sweets and added sugars to 5 servings or less a week. A serving is 1 tablespoon jelly or jam, ½ cup sorbet, or 1 cup of lemonade.  Eat less than 2,300 milligrams (mg) of sodium a day. If you limit your sodium to 1,500 mg a day, you can lower your blood pressure even more.  Be aware that all of these are the suggested number of servings for people who eat 1,800 to 2,000 calories a day. Your recommended number of servings may be different if you need more or fewer calories. Tips for success   Start small. Do not try to make dramatic changes to your diet all at once. You might feel that you are missing out on your favorite foods and then be more likely to not follow the plan. Make small changes, and stick with them. Once those changes become habit, add a few more changes.  Try some of the following:  ? Make it a goal to eat a fruit or vegetable at every meal and at snacks. This will make it easy to get the recommended amount of fruits and vegetables each day. ? Try yogurt topped with fruit and nuts for a snack or healthy dessert. ? Add lettuce, tomato, cucumber, and onion to sandwiches. ? Combine a ready-made pizza crust with low-fat mozzarella cheese and lots of vegetable toppings.  Try using tomatoes, squash, spinach, broccoli, carrots, cauliflower, and onions. ? Have a variety of cut-up vegetables with a low-fat dip as an appetizer instead of chips and dip. ? Sprinkle sunflower seeds or chopped almonds over salads. Or try adding chopped walnuts or almonds to cooked vegetables. ? Try some vegetarian meals using beans and peas. Add garbanzo or kidney beans to salads. Make burritos and tacos with mashed mcclure beans or black beans. Where can you learn more? Go to https://[employer]. org and sign in to your [employer] account. Enter F404 in the [facility] box to learn more about \"DASH Diet: Care Instructions. \"     If you do not have an account, please click on the \"Sign Up Now\" link. Current as of: January 10, 2022               Content Version: 13.3  © 0324-9150 Healthwise, [facility]. Care instructions adapted under license by [facility] ([facility]). If you have questions about a medical condition or this instruction, always ask your healthcare professional. [place] [address] any warranty or liability for your use of this information.

## 2022-07-29 ENCOUNTER — HOSPITAL ENCOUNTER (OUTPATIENT)
Dept: WOMENS IMAGING | Age: 67
Discharge: HOME | End: 2022-07-29
Payer: MEDICAID

## 2022-07-29 ENCOUNTER — HOSPITAL ENCOUNTER (OUTPATIENT)
Dept: ULTRASOUND IMAGING | Age: 67
Discharge: HOME | End: 2022-07-29
Payer: MEDICAID

## 2022-07-29 DIAGNOSIS — R92.8 ABNORMAL MAMMOGRAM: ICD-10-CM

## 2022-07-29 PROCEDURE — G0279 TOMOSYNTHESIS, MAMMO: HCPCS

## 2022-07-29 PROCEDURE — 76641 ULTRASOUND BREAST COMPLETE: CPT

## 2022-08-11 ENCOUNTER — OFFICE VISIT (OUTPATIENT)
Dept: PSYCHOLOGY | Age: 67
End: 2022-08-11
Payer: MEDICAID

## 2022-08-11 DIAGNOSIS — F43.10 PTSD (POST-TRAUMATIC STRESS DISORDER): Primary | ICD-10-CM

## 2022-08-11 PROCEDURE — 1123F ACP DISCUSS/DSCN MKR DOCD: CPT | Performed by: PSYCHOLOGIST

## 2022-08-11 PROCEDURE — 1036F TOBACCO NON-USER: CPT | Performed by: PSYCHOLOGIST

## 2022-08-11 PROCEDURE — 90832 PSYTX W PT 30 MINUTES: CPT | Performed by: PSYCHOLOGIST

## 2022-08-11 NOTE — PROGRESS NOTES
Behavioral Health Consultation  Angela Florez, Ph.D.  Psychologist  8/11/2022  3:22 PM      Time spent with Patient: 20 minutes  This is patient's  14th   [facility] appointment. Reason for Consult:    Chief Complaint   Patient presents with    Anxiety       Feedback given to PCP. S:  Pt seen for f/u of PTSD. Pt reported generally stable mood and sxs, periodic low mood that will last 1-2 days. Watching a lump on her breast, waiting to get colonoscopy bc she needs to fast 2 days. Has acceptance that she has taken good care of herself over the years and what will be will be. Pt appears to be very stable at this time and is agreeable to prn f/u    O:  MSE:  Appearance    alert, cooperative  Appetite normal  Sleep disturbance No  Fatigue Yes  Loss of pleasure No  Impulsive behavior No  Speech    spontaneous, normal rate, normal volume and well articulated  Mood    euhymic  Affect    normal affect  Thought Content    intact  Thought Process    linear, goal directed and coherent  Associations    logical connections  Insight    Fair  Judgment    Intact  Orientation    oriented to person, place, time, and general circumstances  Memory    recent and remote memory intact  Attention/Concentration    intact  Morbid ideation No  Suicide Assessment    no suicidal ideation    History:  Social History:   Social History     Socioeconomic History    Marital status: Legally      Spouse name: Not on file    Number of children: Not on file    Years of education: Not on file    Highest education level: Not on file   Occupational History    Not on file   Tobacco Use    Smoking status: Never    Smokeless tobacco: Never   Vaping Use    Vaping Use: Never used   Substance and Sexual Activity    Alcohol use: Not Currently    Drug use: Never    Sexual activity: Not Currently   Other Topics Concern    Not on file   Social History Narrative    Moved from [place] to [place].  Has a daughter, son-in-law and grandchildren live with her.      Social Determinants of Health     Financial Resource Strain: Low Risk     Difficulty of Paying Living Expenses: Not hard at all   Food Insecurity: No Food Insecurity    Worried About Running Out of Food in the Last Year: Never true    Ran Out of Food in the Last Year: Never true   Transportation Needs: Not on file   Physical Activity: Not on file   Stress: Not on file   Social Connections: Not on file   Intimate Partner Violence: Not on file   Housing Stability: Not on file     TOBACCO:   reports that she has never smoked. She has never used smokeless tobacco.  ETOH:   reports that she does not currently use alcohol. Diagnosis:  1. PTSD (post-traumatic stress disorder)          Plan:  Pt interventions:  Collaboratively set goals with pt re: relapse prevention        Documentation was done using voice recognition dragon software. Every effort was made to ensure accuracy; however, inadvertent, unintentional computerized transcription errors may be present.

## 2022-08-24 ENCOUNTER — OFFICE VISIT (OUTPATIENT)
Dept: ORTHOPEDIC SURGERY | Age: 67
End: 2022-08-24
Payer: MEDICAID

## 2022-08-24 DIAGNOSIS — R20.2 PARESTHESIA OF RIGHT LOWER EXTREMITY: ICD-10-CM

## 2022-08-24 DIAGNOSIS — Z87.81 HISTORY OF VERTEBRAL COMPRESSION FRACTURE: ICD-10-CM

## 2022-08-24 DIAGNOSIS — M51.36 DDD (DEGENERATIVE DISC DISEASE), LUMBAR: ICD-10-CM

## 2022-08-24 DIAGNOSIS — Z86.39 HISTORY OF DIABETES MELLITUS: ICD-10-CM

## 2022-08-24 DIAGNOSIS — M48.061 DEGENERATIVE LUMBAR SPINAL STENOSIS: ICD-10-CM

## 2022-08-24 DIAGNOSIS — M47.816 LUMBAR SPONDYLOSIS: ICD-10-CM

## 2022-08-24 PROCEDURE — G8417 CALC BMI ABV UP PARAM F/U: HCPCS | Performed by: INTERNAL MEDICINE

## 2022-08-24 PROCEDURE — 99214 OFFICE O/P EST MOD 30 MIN: CPT | Performed by: INTERNAL MEDICINE

## 2022-08-24 PROCEDURE — 3017F COLORECTAL CA SCREEN DOC REV: CPT | Performed by: INTERNAL MEDICINE

## 2022-08-24 PROCEDURE — 1036F TOBACCO NON-USER: CPT | Performed by: INTERNAL MEDICINE

## 2022-08-24 PROCEDURE — G8399 PT W/DXA RESULTS DOCUMENT: HCPCS | Performed by: INTERNAL MEDICINE

## 2022-08-24 PROCEDURE — 1123F ACP DISCUSS/DSCN MKR DOCD: CPT | Performed by: INTERNAL MEDICINE

## 2022-08-24 PROCEDURE — G8427 DOCREV CUR MEDS BY ELIG CLIN: HCPCS | Performed by: INTERNAL MEDICINE

## 2022-08-24 PROCEDURE — 1090F PRES/ABSN URINE INCON ASSESS: CPT | Performed by: INTERNAL MEDICINE

## 2022-08-24 RX ORDER — GABAPENTIN 100 MG/1
100 CAPSULE ORAL NIGHTLY
Qty: 30 CAPSULE | Refills: 1 | Status: SHIPPED | OUTPATIENT
Start: 2022-08-24 | End: 2022-10-23

## 2022-08-24 NOTE — PROGRESS NOTES
Chief Complaint:   Chief Complaint   Patient presents with    Back Problem     LBP, R leg pain          History of Present Illness:       Patient is a 74 y.o. female presents with the above complaint. The patient was last seen approximately 3 months ago. The symptoms of right lower limb pain remain problematic. The patient describes a burning pain that does radiate. The symptoms are near constant  and are are worsening since the last visit. The symptoms of leg pain  do not show a neurogenic claudication pattern and is more notable at bedtime. There is not new onset weakness or progressive weakness of the lower extremities that has developed. The patient denies new onset bowel or bladder dysfunction. There is no history of recent spinal trauma. The patient does not have history or orthopaedic lumbar spine surgery. Pain localizes to the*aspect of the right lower extremity thigh and calf    Pain levels: 4       Work-up to date has included:MRI lumbar spine referenced below  Prior treatment has included none. Past history significant for insulin requiring diabetes mellitus since an adolescent. She is on Elavil 100 mg nightly despite this the lower limb symptoms remain problematic. The patient has no history or autoimmune disease, inflammatory arthropathy or crystal arthropathy.         Past Medical History:        Past Medical History:   Diagnosis Date    Anxiety     Depression     Diabetes mellitus ([facility] [address]     Genital herpes     Hyperlipidemia     Hypertension     PTSD (post-traumatic stress disorder)     Vitamin D deficiency          Past Surgical History:   Procedure Laterality Date    CARPAL TUNNEL RELEASE Bilateral     CATARACT REMOVAL Bilateral     2     SECTION      FINGER TRIGGER RELEASE      7 of them    LEG SURGERY Left     screw and plates from brake    TONSILLECTOMY           Present Medications:         Current Outpatient Medications   Medication Sig Dispense Refill gabapentin (NEURONTIN) 100 MG capsule Take 1 capsule by mouth nightly for 60 days. Intended supply: 30 days 30 capsule 1    Calcium-Magnesium-Zinc 333-133-5 MG TABS Take 1 tablet by mouth daily      Cholecalciferol (VITAMIN D3) 50 MCG (2000 UT) TABS TAKE 2 TABLETS BY MOUTH ONCE DAILY WITH BREAKFAST      fluticasone (FLONASE) 50 MCG/ACT nasal spray 1 spray by Each Nostril route daily 1 each 2    amitriptyline (ELAVIL) 100 MG tablet Take 1 tablet by mouth nightly 90 tablet 1    sertraline (ZOLOFT) 100 MG tablet TAKE 1 TABLET BY MOUTH ONCE DAILY 90 tablet 1    pravastatin (PRAVACHOL) 40 MG tablet Take 1 tablet by mouth once daily 90 tablet 1    cyclobenzaprine (FLEXERIL) 5 MG tablet TAKE 1 TABLET BY MOUTH NIGHTLY AS NEEDED FOR MUSCLE SPASM 90 tablet 0    acyclovir (ZOVIRAX) 200 MG capsule Take 2 capsules by mouth twice daily 120 capsule 1    lidocaine (LIDODERM) 5 % Place 1 patch onto the skin every 12 hours 12 hours on, 12 hours off. Max 3 patches at a time 30 patch 0    celecoxib (CELEBREX) 200 MG capsule Take 1 capsule by mouth daily as needed for Pain 30 capsule 3    Cholecalciferol (VITAMIN D3) 25 MCG (1000 UT) CAPS Take 2 capsules by mouth daily 180 capsule 1    insulin glargine (LANTUS) 100 UNIT/ML injection vial Inject 50 Units into the skin every morning      Calcium-Vitamins C & D (CALCIUM/C/D PO) Take by mouth      Multiple Vitamins-Minerals (MULTI-AFIA PO) Take by mouth      Glucosamine-MSM-Hyaluronic Acd (JOINT HEALTH PO) Take by mouth      aspirin 81 MG tablet Take 81 mg by mouth daily      insulin lispro (HUMALOG) 100 UNIT/ML injection vial Inject 15 Units into the skin 3 times daily (before meals) Indications: with sliding scale Sliding scal   <200 = 0 units   221-250 = 1 unit  251-300 = 2 units   301 - 350 = 3 units   351-400= 4 units   401-430 = 5 units       No current facility-administered medications for this visit. Allergies:         Allergies   Allergen Reactions    Atorvastatin Swelling Lisinopril Other (See Comments)    Naproxen Nausea Only    Oxycodone-Acetaminophen Other (See Comments)        Social History:         Social History     Socioeconomic History    Marital status: Legally      Spouse name: Not on file    Number of children: Not on file    Years of education: Not on file    Highest education level: Not on file   Occupational History    Not on file   Tobacco Use    Smoking status: Never    Smokeless tobacco: Never   Vaping Use    Vaping Use: Never used   Substance and Sexual Activity    Alcohol use: Not Currently    Drug use: Never    Sexual activity: Not Currently   Other Topics Concern    Not on file   Social History Narrative    Moved from [facility]. Has a daughter, son-in-law and grandchildren live with her. Social Determinants of Health     Financial Resource Strain: Low Risk     Difficulty of Paying Living Expenses: Not hard at all   Food Insecurity: No Food Insecurity    Worried About Running Out of Food in the Last Year: Never true    Ran Out of Food in the Last Year: Never true   Transportation Needs: Not on file   Physical Activity: Not on file   Stress: Not on file   Social Connections: Not on file   Intimate Partner Violence: Not on file   Housing Stability: Not on file        Review of Symptoms:    Pertinent items are noted in HPI        Vital Signs: There were no vitals filed for this visit. 10 point review of systems negative except as mentioned in HPI      General Exam:     Constitutional: Patient is adequately groomed with no evidence of malnutrition  Mental Status: The patient is oriented to time, place and person. The patient's mood and affect are appropriate. Vascular: Examination reveals no swelling or calf tenderness. Peripheral pulses are palpable and 2+.     Lymphatics: no lymphadenopathy of the inguinal region or lower extremity      Physical Exam: lower back      Primary Exam:    Inspection: No deformity atrophy appreciable curvature      Palpation: No focal trigger point tenderness      Range of Motion: 60/5 without pain      Strength: Normal lower extremity      Special Tests: Negative SLR      Skin: There are no rashes, ulcerations or lesions. Gait: Nonantalgic      Reflex intact lower     Additional Comments:        Additional Examinations:          Neurolgic -Light touch sensation and manual muscle testing normal L2-S1. No fasiculations. Pattella tendon and Achilles tendon reflexes +2 bilaterally. Seated SLR negative           Office Imaging Results/Procedures PerformedToday:          Office Procedures:     Orders Placed This Encounter   Procedures    EMG     EMG - RLE     Standing Status:   Future     Standing Expiration Date:   2023     Scheduling Instructions:      [facility]- Nevin Lam MD      [address] [place] [address] Road      [phone]            Please call Dr. Laurin Schirmer office to schedule your appt. This is your responsibility to schedule, since it is a test order and not a referral.      Results will be sent to Dr. Emile DUONG   within 24 hours, so you may schedule your follow up appt 1-2 days after your EMG to go over your results in the clinic. Please call us to schedule your follow up at [phone]. Order Specific Question:   Which body part? Answer:   RLE           Other Outside Imaging and Testing Personally Reviewed:    Patient Name: Des Cantrell   Case ID: [id number]   Patient : 1955   Referring Physician: Юлия Winchester MD   Exam Date: 2021   Exam Description: MR Lumbar Spine w/o Contrast            HISTORY:  Lumbar pain, lumbar spondylosis. Evaluate L1 vertebral body. Evaluate for    osteomyelitis versus discitis. Patient fell back in shower on May 12, 2021, has ongoing low    back pain. Going to physical therapy for last 3 months without results.        TECHNICAL FACTORS:  Long- and short-axis fat- and water-weighted images were performed. COMPARISON:  None. FINDINGS:  Thoracolumbar junction is intact and lumbar spine alignment is anatomic. Anterior    and middle columns are intact as well as the anterior and posterior longitudinal ligaments. No    focal ligamentous disruption or epidural fluid collection is appreciated. Vertebral marrow signal appears normal.       Conus medullaris is visualized at L1-L2 and visualized spinal cord and cauda equina nerve roots    appear normal.  No evidence of an intradural or extradural mass is present. T12-L1: Sterile osteochondrosis. L1-L2: Subacute complex fracture of the L1 vertebral body with approximately 20% vertebral    height loss along the anterior-inferior aspect of the endplate. Sterile osteochondrosis. Disc    desiccation. Moderate intervertebral disc space height loss. Left foraminal protrusion-type    herniation combined with facet arthropathy contributes to moderate left foraminal narrowing    with moderate impingement on the exiting L1 nerve root sleeve. L2-L3: Disc desiccation. Bilobed foraminal protrusions left greater than right combined with    facet arthropathy produce moderate foraminal narrowing bilaterally and contact of the exiting    L2 nerve root sleeves. No canal stenosis. L3-L4: Chronic compression deformity along the superior endplate of the L3 vertebral body with    approximately 25% loss of vertebral height. Sterile osteochondrosis. Disc desiccation. Moderate intervertebral disc space height loss. Bilobed foraminal protrusions combined with    facet arthropathy produces moderate right foraminal narrowing with impingement on the exiting    right L3 nerve root sleeve. Mild left foraminal narrowing. Thickening of the ligamentum flavum    contributes to moderate canal stenosis. L4-L5: Sterile osteochondrosis. Disc desiccation. Broad-based disc protrusion. No canal    stenosis.   Thickening of the ligamentum flavum with moderate facet arthropathy contribute to    produce moderate canal stenosis and moderate foraminal narrowing. L5-S1: Disc desiccation. Small central disc protrusion. No canal stenosis. Moderate facet    arthropathy and thickening of the ligamentum flavum. Bilateral facet capsulitis. Mild    bilateral foraminal narrowing. Prevertebral and paraspinal soft tissues demonstrate moderate sarcopenia. Edema within the posterior soft tissue may reflect a contusion. Visualized soft tissues of the abdomen and pelvis are normal.       No aortic aneurysm or atherosclerosis of the aorta noted. No signs of retroaortic renal vein or other anomaly. No renal or adrenal masses are identified. No incidental pelvic masses present. CONCLUSION:   1. No evidence of osteomyelitis or discitis. 2. Subacute complex fracture along the inferior endplate of L1. No evidence of neoplastic    infiltration. 3. At L1-L2, moderate impingement on the exiting left L1 nerve root sleeve secondary to a left    foraminal protrusion-type herniation in conjunction with facet arthropathy. 4. At L3-L4, moderate canal stenosis and impingement on the exiting right L3 nerve root sleeve    secondary to bilobed foraminal protrusions in conjunction with facet arthropathy. Chronic    compression deformity along the L3 vertebral body. Consider DEXA scan for further evaluation. Thank you for the opportunity to provide your interpretation. Angie Aguirre MD       A: Abbey 08/13/2021 9:07 AM   T: ISADORA 08/12/2021 5:17 PM                 Assessment   Impression: . Encounter Diagnoses   Name Primary?     Paresthesia of right lower extremity     History of diabetes mellitus     Degenerative lumbar spinal stenosis     DDD (degenerative disc disease), lumbar     Lumbar spondylosis               Plan:     EMG right lower extremity evaluate for diabetic peripheral neuropathy  Trial of Neurontin 100 mg nightly titrate to efficacy and continue current dose of Elavil 100 mg nightly  Consider repeat MRI evaluation lumbar spine pending results of EMG  Continue Celebrex daily as needed with GI precaution  Activities as tolerated caution against overuse lumbar spine precautions    The nature of the finding, probable diagnosis and likely treatment was thoroughly discussed with the patient. The options, risks, complications, alternative treatment as well as some of the differential diagnosis was discussed. The patient was thoroughly informed and all questions were answered. the patient indicated understanding and satisfaction with the discussion. Orders:        Orders Placed This Encounter   Procedures    EMG     EMG - RLE     Standing Status:   Future     Standing Expiration Date:   8/24/2023     Scheduling Instructions:      [facility]- Bre Myers MD      [address] [place] [address]      [phone]            Please call Dr. Chantel Sanchez office to schedule your appt. This is your responsibility to schedule, since it is a test order and not a referral.      Results will be sent to Dr. Melonie Silva within 24 hours, so you may schedule your follow up appt 1-2 days after your EMG to go over your results in the clinic. Please call us to schedule your follow up at [phone]. Order Specific Question:   Which body part? Answer:   RLE           Disclaimer: \"This note was dictated with voice recognition software. Though review and correction are routine, we apologize for any errors. \"

## 2022-08-26 DIAGNOSIS — M80.00XG OSTEOPOROSIS WITH CURRENT PATHOLOGICAL FRACTURE WITH DELAYED HEALING, UNSPECIFIED OSTEOPOROSIS TYPE, SUBSEQUENT ENCOUNTER: ICD-10-CM

## 2022-08-26 DIAGNOSIS — S32.010S CLOSED WEDGE COMPRESSION FRACTURE OF L1 VERTEBRA, SEQUELA: ICD-10-CM

## 2022-08-26 DIAGNOSIS — M47.816 LUMBAR SPONDYLOSIS: ICD-10-CM

## 2022-08-26 DIAGNOSIS — M51.36 DDD (DEGENERATIVE DISC DISEASE), LUMBAR: ICD-10-CM

## 2022-08-26 NOTE — TELEPHONE ENCOUNTER
Prescription Refill     Medication Name:  CELEBREX  Pharmacy: [id number] [facility]  Patient Contact Number:  [phone]    Please call patient once sent

## 2022-08-27 RX ORDER — CELECOXIB 200 MG/1
200 CAPSULE ORAL DAILY PRN
Qty: 30 CAPSULE | Refills: 3 | Status: SHIPPED | OUTPATIENT
Start: 2022-08-27

## 2022-09-19 ENCOUNTER — TELEPHONE (OUTPATIENT)
Dept: INTERNAL MEDICINE CLINIC | Age: 67
End: 2022-09-19

## 2022-09-19 NOTE — TELEPHONE ENCOUNTER
Patient advised per Dr. Mariella Todd to be evaluated in urgent care, since there are no available openings today for a virtual visit. Patient stated that she did not want to go anywhere.

## 2022-09-19 NOTE — TELEPHONE ENCOUNTER
Pt calling started with cough-body aches--chest congestion---headache last Thursday --on Friday night  she took covid test and it was positive-----can someone call her in the [facility] today would be day 5? Please call the pt. Thanks.

## 2022-09-20 ENCOUNTER — TELEPHONE (OUTPATIENT)
Dept: INTERNAL MEDICINE CLINIC | Age: 67
End: 2022-09-20

## 2022-09-20 DIAGNOSIS — F43.10 PTSD (POST-TRAUMATIC STRESS DISORDER): ICD-10-CM

## 2022-09-20 DIAGNOSIS — A60.00 GENITAL HERPES SIMPLEX, UNSPECIFIED SITE: ICD-10-CM

## 2022-09-20 RX ORDER — CHOLECALCIFEROL (VITAMIN D3) 125 MCG
CAPSULE ORAL
Qty: 120 TABLET | Refills: 0 | Status: SHIPPED | OUTPATIENT
Start: 2022-09-20

## 2022-09-20 RX ORDER — SERTRALINE HYDROCHLORIDE 100 MG/1
TABLET, FILM COATED ORAL
Qty: 60 TABLET | Refills: 0 | Status: SHIPPED | OUTPATIENT
Start: 2022-09-20

## 2022-09-20 RX ORDER — ACYCLOVIR 200 MG/1
CAPSULE ORAL
Qty: 120 CAPSULE | Refills: 0 | Status: SHIPPED | OUTPATIENT
Start: 2022-09-20

## 2022-09-20 NOTE — TELEPHONE ENCOUNTER
Pt calling requesting refill of   VitD3 (5/3/22) Acyclovir and Sertraline (5/10/22)    Last written  see above  Last OV  6/28/22  Next OV  11/23/22  Last recommended OV   NA    Please send to   [facility]

## 2022-10-20 ENCOUNTER — TELEPHONE (OUTPATIENT)
Dept: INTERNAL MEDICINE CLINIC | Age: 67
End: 2022-10-20

## 2022-10-20 DIAGNOSIS — R92.8 ABNORMAL MAMMOGRAM: Primary | ICD-10-CM

## 2022-10-20 DIAGNOSIS — M25.511 CHRONIC RIGHT SHOULDER PAIN: ICD-10-CM

## 2022-10-20 DIAGNOSIS — G89.29 CHRONIC RIGHT SHOULDER PAIN: ICD-10-CM

## 2022-10-20 RX ORDER — CYCLOBENZAPRINE HCL 5 MG
TABLET ORAL
Qty: 90 TABLET | Refills: 0 | Status: SHIPPED | OUTPATIENT
Start: 2022-10-20

## 2022-10-20 NOTE — TELEPHONE ENCOUNTER
----- Message from Sudha Mchugh sent at 10/20/2022  3:40 PM EDT -----  Subject: Message to Provider    QUESTIONS  Information for Provider? Patient is calling to see if needs a referral   for a mammagram that she was told back in July to do another one in six   months. NOt sure about this. cb pt   ---------------------------------------------------------------------------  --------------  Renato DEMPSEY  [phone]; OK to leave message on voicemail  ---------------------------------------------------------------------------  --------------  SCRIPT ANSWERS  Relationship to Patient?  Self

## 2022-11-03 ENCOUNTER — TELEPHONE (OUTPATIENT)
Dept: ORTHOPEDIC SURGERY | Age: 67
End: 2022-11-03

## 2022-11-03 ENCOUNTER — OFFICE VISIT (OUTPATIENT)
Dept: ORTHOPEDIC SURGERY | Age: 67
End: 2022-11-03
Payer: MEDICAID

## 2022-11-03 VITALS — BODY MASS INDEX: 31.51 KG/M2 | HEIGHT: 59 IN | WEIGHT: 156.31 LBS

## 2022-11-03 DIAGNOSIS — S80.01XA CONTUSION OF RIGHT KNEE, INITIAL ENCOUNTER: Primary | ICD-10-CM

## 2022-11-03 DIAGNOSIS — S83.91XA SPRAIN OF RIGHT KNEE, UNSPECIFIED LIGAMENT, INITIAL ENCOUNTER: ICD-10-CM

## 2022-11-03 DIAGNOSIS — M25.461 EFFUSION OF RIGHT KNEE: ICD-10-CM

## 2022-11-03 PROCEDURE — G8399 PT W/DXA RESULTS DOCUMENT: HCPCS | Performed by: INTERNAL MEDICINE

## 2022-11-03 PROCEDURE — G8427 DOCREV CUR MEDS BY ELIG CLIN: HCPCS | Performed by: INTERNAL MEDICINE

## 2022-11-03 PROCEDURE — G8484 FLU IMMUNIZE NO ADMIN: HCPCS | Performed by: INTERNAL MEDICINE

## 2022-11-03 PROCEDURE — 3017F COLORECTAL CA SCREEN DOC REV: CPT | Performed by: INTERNAL MEDICINE

## 2022-11-03 PROCEDURE — G8417 CALC BMI ABV UP PARAM F/U: HCPCS | Performed by: INTERNAL MEDICINE

## 2022-11-03 PROCEDURE — 1036F TOBACCO NON-USER: CPT | Performed by: INTERNAL MEDICINE

## 2022-11-03 PROCEDURE — 99214 OFFICE O/P EST MOD 30 MIN: CPT | Performed by: INTERNAL MEDICINE

## 2022-11-03 PROCEDURE — 1123F ACP DISCUSS/DSCN MKR DOCD: CPT | Performed by: INTERNAL MEDICINE

## 2022-11-03 PROCEDURE — 1090F PRES/ABSN URINE INCON ASSESS: CPT | Performed by: INTERNAL MEDICINE

## 2022-11-03 RX ORDER — HYDROCODONE BITARTRATE AND ACETAMINOPHEN 5; 325 MG/1; MG/1
1 TABLET ORAL EVERY 8 HOURS PRN
Qty: 20 TABLET | Refills: 0 | Status: SHIPPED | OUTPATIENT
Start: 2022-11-03 | End: 2022-11-10

## 2022-11-03 RX ORDER — HYDROCODONE BITARTRATE AND ACETAMINOPHEN 5; 325 MG/1; MG/1
1 TABLET ORAL EVERY 8 HOURS PRN
Qty: 20 TABLET | Refills: 0 | Status: SHIPPED | OUTPATIENT
Start: 2022-11-03 | End: 2022-11-03 | Stop reason: SDUPTHER

## 2022-11-03 NOTE — PROGRESS NOTES
Chief Complaint:   Chief Complaint   Patient presents with    Leg Pain     right, leg has been giving out past 5-6 wks, has not yet had EMG due to having COVID recently, fell in driveway on [address], landed on R knee, pain and dysfunction and unable to WB, went to ED, had CT scan and was given immobilizer, walker and pain meds          History of Present Illness:       Patient is a 74 y.o. female presents with the above complaint. The symptoms began 2 daysago and started with an injury related to falling in the garage of her home impacting the anterior aspect of her knee. . The patient describes a aching, stiffness pain that does not radiate. The symptoms are constant  and are show no change since the onset. She was treated and released from the emergency room and presents here for further evaluation. CT scan of the outlined below in detail    Pain is diffuse  and  does not seems to follow a typical patella femoral provacative pattern. There are not mechanical symptoms that suggest meniscal injury. The patient admits to subjective instability about the knee and denies new onset weakness of the lower extremity. Pain level 8    The patient admits to a pattern of activity related swelling. Treatment to date: Bracing: Knee immobilizer with crutch assisted weightbearing. There is no prior history of knee trauma. There is no prior history of autoimmune disease, inflammatory arthropathy, or crystal arthropathy.              Past Medical History:        Past Medical History:   Diagnosis Date    Anxiety     Depression     Diabetes mellitus ([address]     Genital herpes     Hyperlipidemia     Hypertension     PTSD (post-traumatic stress disorder)     Vitamin D deficiency          Past Surgical History:   Procedure Laterality Date    CARPAL TUNNEL RELEASE Bilateral     CATARACT REMOVAL Bilateral     2     SECTION      FINGER TRIGGER RELEASE      7 of them    LEG SURGERY Left     screw and plates from [facility]    TONSILLECTOMY           Present Medications:         Current Outpatient Medications   Medication Sig Dispense Refill    cyclobenzaprine (FLEXERIL) 5 MG tablet TAKE 1 TABLET BY MOUTH NIGHTLY AS NEEDED FOR MUSCLE SPASM 90 tablet 0    acyclovir (ZOVIRAX) 200 MG capsule Take 2 capsules by mouth twice daily 120 capsule 0    Cholecalciferol (VITAMIN D3) 50 MCG (2000 UT) TABS TAKE 2 TABLETS BY MOUTH ONCE DAILY WITH BREAKFAST 120 tablet 0    sertraline (ZOLOFT) 100 MG tablet TAKE 1 TABLET BY MOUTH ONCE DAILY 60 tablet 0    celecoxib (CELEBREX) 200 MG capsule Take 1 capsule by mouth daily as needed for Pain 30 capsule 3    gabapentin (NEURONTIN) 100 MG capsule Take 1 capsule by mouth nightly for 60 days. Intended supply: 30 days 30 capsule 1    Calcium-Magnesium-Zinc 333-133-5 MG TABS Take 1 tablet by mouth daily      fluticasone (FLONASE) 50 MCG/ACT nasal spray 1 spray by Each Nostril route daily 1 each 2    amitriptyline (ELAVIL) 100 MG tablet Take 1 tablet by mouth nightly 90 tablet 1    pravastatin (PRAVACHOL) 40 MG tablet Take 1 tablet by mouth once daily 90 tablet 1    lidocaine (LIDODERM) 5 % Place 1 patch onto the skin every 12 hours 12 hours on, 12 hours off.   Max 3 patches at a time 30 patch 0    Cholecalciferol (VITAMIN D3) 25 MCG (1000 UT) CAPS Take 2 capsules by mouth daily 180 capsule 1    insulin glargine (LANTUS) 100 UNIT/ML injection vial Inject 50 Units into the skin every morning      Calcium-Vitamins C & D (CALCIUM/C/D PO) Take by mouth      Multiple Vitamins-Minerals (MULTI-AFIA PO) Take by mouth      Glucosamine-MSM-Hyaluronic Acd (JOINT HEALTH PO) Take by mouth      aspirin 81 MG tablet Take 81 mg by mouth daily      insulin lispro (HUMALOG) 100 UNIT/ML injection vial Inject 15 Units into the skin 3 times daily (before meals) Indications: with sliding scale Sliding scal   <200 = 0 units   221-250 = 1 unit  251-300 = 2 units   301 - 350 = 3 units   351-400= 4 units   401-430 = 5 units       No current facility-administered medications for this visit. Allergies: Allergies   Allergen Reactions    Atorvastatin Swelling    Lisinopril Other (See Comments)    Naproxen Nausea Only    Oxycodone-Acetaminophen Other (See Comments)        Social History:         Social History     Socioeconomic History    Marital status: Legally      Spouse name: Not on file    Number of children: Not on file    Years of education: Not on file    Highest education level: Not on file   Occupational History    Not on file   Tobacco Use    Smoking status: Never    Smokeless tobacco: Never   Vaping Use    Vaping Use: Never used   Substance and Sexual Activity    Alcohol use: Not Currently    Drug use: Never    Sexual activity: Not Currently   Other Topics Concern    Not on file   Social History Narrative    Moved from [place] to [place]. Has a daughter, son-in-law and grandchildren live with her. Social Determinants of Health     Financial Resource Strain: Low Risk     Difficulty of Paying Living Expenses: Not hard at all   Food Insecurity: No Food Insecurity    Worried About Running Out of Food in the Last Year: Never true    Ran Out of Food in the Last Year: Never true   Transportation Needs: Not on file   Physical Activity: Not on file   Stress: Not on file   Social Connections: Not on file   Intimate Partner Violence: Not on file   Housing Stability: Not on file        Review of Symptoms:    Pertinent items are noted in HPI    Review of systems reviewed from Patient History Form dated on today's date and   available in the patient's chart under the Media tab. Vital Signs: There were no vitals filed for this visit. General Exam:     Constitutional: Patient is adequately groomed with no evidence of malnutrition  Mental Status: The patient is oriented to time, place and person. The patient's mood and affect are appropriate. Vascular: Examination reveals no swelling or calf tenderness. Peripheral pulses are palpable and 2+. Lymphatics: no lymphadenopathy of the inguinal region or lower extremity      Physical Exam: left knee      Primary Exam:    Inspection: Mild to moderate effusion, superficial abrasion prepatellar no deformity or atrophy      Palpation: Mild tenderness medial lateral joint lines      Range of Motion: 0/70 limited by pain and subjective tightness      Strength: Mildly diminished with SLR associate with pain      Special Tests: Lachman test negative collateral ligament stressing stable otherwise limited by AROM      Skin: There are no rashes, ulcerations or lesions. Gait: Antalgic crutch dependent      Reflex intact lower     Additional Comments:        Additional Examinations:           Left Lower Extremity: Examination of the left lower extremity does not show any tenderness, deformity or injury. Range of motion is unremarkable. There is no gross instability. There are no rashes, ulcerations or lesions. Strength and tone are normal.   Neurolgic -Light touch sensation and manual muscle testing normal L2-S1. No fasiculations. Pattella tendon and Achilles tendon reflexes +2 bilaterally. Seated SLR negative        Office Imaging Results/Procedures PerformedToday:          Office Procedures:   No orders of the defined types were placed in this encounter. Other Outside Imaging and Testing Personally Reviewed:    Lower Extremity right Computed Tomography     Impression    IMPRESSION:   1. No discrete acute fracture seen. No dislocation. 2. Large joint effusion the suprapatellar bursa with high attenuation which could be due to hemorrhagic fluid. 3. Moderate sized popliteal fossa cyst/Baker's cyst with complex fluid which could be due to hemorrhagic fluid. 4. Chronic degenerative changes including patellofemoral joint. 5. Suggestion of small free body in the lateral aspect of the knee joint space.    6. Curvilinear calcific density in the posterior aspect of the knee joint which could be at and adjacent to the posterior cruciate ligament of uncertain etiology. 7. Follow-up MRI right knee could be helpful for further evaluation of possible cruciate ligament or meniscal injury. Dictated by Bernard Romo MD, PhD  Trina Verdin EI:[id number]  Narrative    HISTORY: FALL,     COMPARISON:  Right femur x-ray on 11/1/2022     TECHNIQUE:  Automatic exposure control was used for dose reduction. MEDICATIONS:  None, per referring physician request.     FINDINGS   CT images of the right knee. Large joint effusion in the superficial bursa region with high attenuation. No focal discrete acute fracture seen. Chondrocalcinosis in the menisci. Curvilinear calcific density in the posterior aspect of the knee joint. Small free body in the lateral aspect of the hip joint at about 4 mm. Subchondral heterogeneous appearance in the medial femoral condyle and the medial tibial plateau region consistent with subchondral degenerative changes. Subtle spurring in the medial aspect of the knee joint. Heterogeneous appearance in the posterior aspect of the patella consistent with subchondral degenerative change. Low-attenuation structure in the medial aspect of the posterior fossa consistent with probably fossa cyst/Baker's cyst at about 4.5 x 2.3 x 1.7 cm with fluid-fluid level which could be due to hemorrhagic fluid. Procedure Note    Tracy Solares MD - 11/01/2022   Formatting of this note might be different from the original.   HISTORY: FALL,     COMPARISON:  Right femur x-ray on 11/1/2022     TECHNIQUE:  Automatic exposure control was used for dose reduction. MEDICATIONS:  None, per referring physician request.     FINDINGS   CT images of the right knee. Large joint effusion in the superficial bursa region with high attenuation. No focal discrete acute fracture seen. Chondrocalcinosis in the menisci. Curvilinear calcific density in the posterior aspect of the knee joint.  Small free body in the lateral aspect of the hip joint at about 4 mm. Subchondral heterogeneous appearance in the medial femoral condyle and the medial tibial plateau region consistent with subchondral degenerative changes. Subtle spurring in the medial aspect of the knee joint. Heterogeneous appearance in the posterior aspect of the patella consistent with subchondral degenerative change. Low-attenuation structure in the medial aspect of the posterior fossa consistent with probably fossa cyst/Baker's cyst at about 4.5 x 2.3 x 1.7 cm with fluid-fluid level which could be due to hemorrhagic fluid. IMPRESSION:   1. No discrete acute fracture seen. No dislocation. 2. Large joint effusion the suprapatellar bursa with high attenuation which could be due to hemorrhagic fluid. 3. Moderate sized popliteal fossa cyst/Baker's cyst with complex fluid which could be due to hemorrhagic fluid. 4. Chronic degenerative changes including patellofemoral joint. 5. Suggestion of small free body in the lateral aspect of the knee joint space. 6. Curvilinear calcific density in the posterior aspect of the knee joint which could be at and adjacent to the posterior cruciate ligament of uncertain etiology. 7. Follow-up MRI right knee could be helpful for further evaluation of possible cruciate ligament or meniscal injury. Dictated by Fraknlin Smith MD, PhD  [facility]:[id number]  Exam End: 11/01/22 22:46             Assessment   Impression: . Encounter Diagnoses   Name Primary?     Contusion of right knee, initial encounter Yes    Sprain of right knee, unspecified ligament, initial encounter     Effusion of right knee               Plan:       Continue knee immobilization and crutch assisted ambulation full weightbearing as tolerated  She can start heel slides and quad isometrics as tolerated  Norco short-term minimize use of narcotics caution against constipation and local measures for symptom control  Clinical follow-up in 7 to 10 days  Ultrasound-guided aspiration and injection of the knee only as needed      The nature of the finding, probable diagnosis and likely treatment was thoroughly discussed with the patient. The options, risks, complications, alternative treatment as well as some of the differential diagnosis was discussed. The patient was thoroughly informed and all questions were answered. the patient indicated understanding and satisfaction with the discussion. Orders:      No orders of the defined types were placed in this encounter. Disclaimer: \"This note was dictated with voice recognition software. Though review and correction are routine, we apologize for any errors. \"

## 2022-11-03 NOTE — TELEPHONE ENCOUNTER
General Question     Subject: PAIN RX  Patient and /or Facility Request: [address] Number:  [phone]    PT AND CHILD WENT TO PHARMACY TO GET PAIN RX AND IT WAS NOT THERE.  PT REQ CALLBACK ASAP TO ADVISE WHEN RX ORDER IS SENT TO PHARMACY

## 2022-11-14 ENCOUNTER — OFFICE VISIT (OUTPATIENT)
Dept: ORTHOPEDIC SURGERY | Age: 67
End: 2022-11-14
Payer: COMMERCIAL

## 2022-11-14 VITALS — BODY MASS INDEX: 31.51 KG/M2 | HEIGHT: 59 IN | WEIGHT: 156.31 LBS

## 2022-11-14 DIAGNOSIS — S80.01XD CONTUSION OF RIGHT KNEE, SUBSEQUENT ENCOUNTER: ICD-10-CM

## 2022-11-14 DIAGNOSIS — S83.91XD SPRAIN OF RIGHT KNEE, UNSPECIFIED LIGAMENT, SUBSEQUENT ENCOUNTER: ICD-10-CM

## 2022-11-14 PROCEDURE — E0100 CANE ADJUST/FIXED WITH TIP: HCPCS | Performed by: INTERNAL MEDICINE

## 2022-11-14 PROCEDURE — 1090F PRES/ABSN URINE INCON ASSESS: CPT | Performed by: INTERNAL MEDICINE

## 2022-11-14 PROCEDURE — 3017F COLORECTAL CA SCREEN DOC REV: CPT | Performed by: INTERNAL MEDICINE

## 2022-11-14 PROCEDURE — G8399 PT W/DXA RESULTS DOCUMENT: HCPCS | Performed by: INTERNAL MEDICINE

## 2022-11-14 PROCEDURE — 99213 OFFICE O/P EST LOW 20 MIN: CPT | Performed by: INTERNAL MEDICINE

## 2022-11-14 PROCEDURE — G8417 CALC BMI ABV UP PARAM F/U: HCPCS | Performed by: INTERNAL MEDICINE

## 2022-11-14 PROCEDURE — 1036F TOBACCO NON-USER: CPT | Performed by: INTERNAL MEDICINE

## 2022-11-14 PROCEDURE — G8427 DOCREV CUR MEDS BY ELIG CLIN: HCPCS | Performed by: INTERNAL MEDICINE

## 2022-11-14 PROCEDURE — 1123F ACP DISCUSS/DSCN MKR DOCD: CPT | Performed by: INTERNAL MEDICINE

## 2022-11-14 PROCEDURE — G8484 FLU IMMUNIZE NO ADMIN: HCPCS | Performed by: INTERNAL MEDICINE

## 2022-11-14 PROCEDURE — L1810 KO ELASTIC WITH JOINTS: HCPCS | Performed by: INTERNAL MEDICINE

## 2022-11-14 RX ORDER — GABAPENTIN 100 MG/1
100 CAPSULE ORAL NIGHTLY
Qty: 30 CAPSULE | Refills: 1 | Status: SHIPPED | OUTPATIENT
Start: 2022-11-14 | End: 2023-01-13

## 2022-11-14 NOTE — PROGRESS NOTES
Chief Complaint:   Chief Complaint   Patient presents with    Knee Pain     F/U R knee, feeling better than last visit. Exercises and stretching are helping. Still pain with increased activity. History of Present Illness:       Patient is a 74 y.o. female returns follow up for the above complaint. The patient was last seen approximately 11 daysago. The symptoms are worsening since the last visit. The patient has had no further testing for the problem. The patient estimates greater than 50% improvement and is increasing more functional.  This is corroborated by her adult daughter    Pain levels 5/10    Only as needed use of hydrocodone    No new injuries no new events    She continues with knee immobilizer and walker assisted ambulation   Past Medical History:        Past Medical History:   Diagnosis Date    Anxiety     Depression     Diabetes mellitus ([facility] [address]     Genital herpes     Hyperlipidemia     Hypertension     PTSD (post-traumatic stress disorder)     Vitamin D deficiency         Present Medications:         Current Outpatient Medications   Medication Sig Dispense Refill    gabapentin (NEURONTIN) 100 MG capsule Take 1 capsule by mouth nightly for 60 days.  Intended supply: 30 days 30 capsule 1    cyclobenzaprine (FLEXERIL) 5 MG tablet TAKE 1 TABLET BY MOUTH NIGHTLY AS NEEDED FOR MUSCLE SPASM 90 tablet 0    acyclovir (ZOVIRAX) 200 MG capsule Take 2 capsules by mouth twice daily 120 capsule 0    Cholecalciferol (VITAMIN D3) 50 MCG (2000 UT) TABS TAKE 2 TABLETS BY MOUTH ONCE DAILY WITH BREAKFAST 120 tablet 0    sertraline (ZOLOFT) 100 MG tablet TAKE 1 TABLET BY MOUTH ONCE DAILY 60 tablet 0    celecoxib (CELEBREX) 200 MG capsule Take 1 capsule by mouth daily as needed for Pain 30 capsule 3    Calcium-Magnesium-Zinc 333-133-5 MG TABS Take 1 tablet by mouth daily      fluticasone (FLONASE) 50 MCG/ACT nasal spray 1 spray by Each Nostril route daily 1 each 2    amitriptyline (ELAVIL) 100 MG tablet Take 1 tablet by mouth nightly 90 tablet 1    pravastatin (PRAVACHOL) 40 MG tablet Take 1 tablet by mouth once daily 90 tablet 1    lidocaine (LIDODERM) 5 % Place 1 patch onto the skin every 12 hours 12 hours on, 12 hours off. Max 3 patches at a time 30 patch 0    Cholecalciferol (VITAMIN D3) 25 MCG (1000 UT) CAPS Take 2 capsules by mouth daily 180 capsule 1    insulin glargine (LANTUS) 100 UNIT/ML injection vial Inject 50 Units into the skin every morning      Calcium-Vitamins C & D (CALCIUM/C/D PO) Take by mouth      Multiple Vitamins-Minerals (MULTI-AFIA PO) Take by mouth      Glucosamine-MSM-Hyaluronic Acd (JOINT HEALTH PO) Take by mouth      aspirin 81 MG tablet Take 81 mg by mouth daily      insulin lispro (HUMALOG) 100 UNIT/ML injection vial Inject 15 Units into the skin 3 times daily (before meals) Indications: with sliding scale Sliding scal   <200 = 0 units   221-250 = 1 unit  251-300 = 2 units   301 - 350 = 3 units   351-400= 4 units   401-430 = 5 units       No current facility-administered medications for this visit. Allergies: Allergies   Allergen Reactions    Atorvastatin Swelling    Lisinopril Other (See Comments)    Naproxen Nausea Only    Oxycodone-Acetaminophen Other (See Comments)           Review of Systems:    Pertinent items are noted in HPI      Vital Signs: There were no vitals filed for this visit. General Exam:     Constitutional: Patient is adequately groomed with no evidence of malnutrition    Physical Exam: right knee      Primary Exam:    Inspection: Interval resolution of intra-articular effusion; mild superficial ecchymosis medial thigh      Palpation: No focal tenderness      Range of Motion: 0/120      Strength: Near normal with SLR      Special Tests: Lachman test and collateral ligament stressing stable anterior posterior drawer negative      Skin: Superficial abrasion over the prepatellar soft tissues clinically improved and healing.         Gait: Near normal    Neurovascular - non focal and intact       Additional Comments:        Additional Examinations:                   Office Imaging Results/Procedures PerformedToday:          Office Procedures:     Orders Placed This Encounter   Procedures    B[employer] Hinged Knee Brace     Patient was prescribed a [employer] Hinged Knee Brace. The right knee will require stabilization / immobilization from this semi-rigid / rigid orthosis to improve their function. The orthosis will assist in protecting the affected area, provide functional support and facilitate healing. The patient was educated and fit by a healthcare professional with expert knowledge and specialization in brace application while under the direct supervision of the treating physician. Verbal and written instructions for the use of and application of this item were provided. They were instructed to contact the office immediately should the brace result in increased pain, decreased sensation, increased swelling or worsening of the condition. Cane Medline     Patient was prescribed a Medline Cane. This mobility device is required for the following reasons:    Patient has mobility limitations that significantly impair their ability to participate in one or more mobility related activities of daily living. The patient is able to safely use the mobility device. Functional mobility deficit can be sufficiently resolved with the use of this device. Verbal and written instructions for the use of and application of this item were provided. The patient was educated and fit by a healthcare professional with expert knowledge and specialization in brace application while under the direct supervision of the treating physician. They were instructed to contact the office immediately should the equipment result in increased pain, decreased sensation, increased swelling or worsening of the condition.            Other Outside Imaging and Testing Personally Reviewed:    No results found. Assessment   Impression: . Encounter Diagnoses   Name Primary? Contusion of right knee, subsequent encounter     Sprain of right knee, unspecified ligament, subsequent encounter               Plan:     Transition to functional hinged knee brace with cane assistance  Continue knee conditioning home exercises  Local measures for symptom control inclusive of Tegaderm dressing over the skin abrasion impairing use of Band-Aid thereafter  Clinical follow-up in 1 month and formal course of PT as needed       Orders:        Orders Placed This Encounter   Procedures    Breg Economy Hinged Knee Brace     Patient was prescribed a Breg [employer] Hinged Knee Brace. The right knee will require stabilization / immobilization from this semi-rigid / rigid orthosis to improve their function. The orthosis will assist in protecting the affected area, provide functional support and facilitate healing. The patient was educated and fit by a healthcare professional with expert knowledge and specialization in brace application while under the direct supervision of the treating physician. Verbal and written instructions for the use of and application of this item were provided. They were instructed to contact the office immediately should the brace result in increased pain, decreased sensation, increased swelling or worsening of the condition. Cane Medline     Patient was prescribed a Medline Cane. This mobility device is required for the following reasons:    Patient has mobility limitations that significantly impair their ability to participate in one or more mobility related activities of daily living. The patient is able to safely use the mobility device. Functional mobility deficit can be sufficiently resolved with the use of this device. Verbal and written instructions for the use of and application of this item were provided.   The patient was educated and fit by a healthcare professional with expert knowledge and specialization in brace application while under the direct supervision of the treating physician. They were instructed to contact the office immediately should the equipment result in increased pain, decreased sensation, increased swelling or worsening of the condition. Dara Streeter MD.      Disclaimer: \"This note was dictated with voice recognition software. Though review and correction are routine, we apologize for any errors. \"

## 2022-11-17 DIAGNOSIS — R92.8 ABNORMAL MAMMOGRAM: Primary | ICD-10-CM

## 2022-11-22 ENCOUNTER — TELEPHONE (OUTPATIENT)
Dept: INTERNAL MEDICINE CLINIC | Age: 67
End: 2022-11-22

## 2022-11-22 DIAGNOSIS — F43.10 PTSD (POST-TRAUMATIC STRESS DISORDER): ICD-10-CM

## 2022-11-22 DIAGNOSIS — A60.00 GENITAL HERPES SIMPLEX, UNSPECIFIED SITE: ICD-10-CM

## 2022-11-22 RX ORDER — ACYCLOVIR 200 MG/1
CAPSULE ORAL
Qty: 120 CAPSULE | Refills: 0 | Status: SHIPPED | OUTPATIENT
Start: 2022-11-22

## 2022-11-22 RX ORDER — AMITRIPTYLINE HYDROCHLORIDE 100 MG/1
100 TABLET, FILM COATED ORAL NIGHTLY
Qty: 90 TABLET | Refills: 0 | Status: SHIPPED | OUTPATIENT
Start: 2022-11-22

## 2022-11-22 NOTE — TELEPHONE ENCOUNTER
----- Message from Josh Agrawal sent at 11/22/2022 11:13 AM EST -----  Subject: Refill Request    QUESTIONS  Name of Medication? acyclovir (ZOVIRAX) 200 MG capsule  Patient-reported dosage and instructions? 2 capsules by mouth at night   How many days do you have left? 14  Preferred Pharmacy? [address]  Pharmacy phone number (if available)? [phone]  ---------------------------------------------------------------------------  --------------,  Name of Medication? amitriptyline (ELAVIL) 100 MG tablet  Patient-reported dosage and instructions? 1 tablet at night  How many days do you have left? 7  Preferred Pharmacy? [address]  Pharmacy phone number (if available)? [phone]  ---------------------------------------------------------------------------  --------------  Tisha MARTI  What is the best way for the office to contact you? OK to leave message on   voicemail  Preferred Call Back Phone Number? [phone]  ---------------------------------------------------------------------------  --------------  SCRIPT ANSWERS  Relationship to Patient?  Self

## 2022-12-06 ENCOUNTER — OFFICE VISIT (OUTPATIENT)
Dept: INTERNAL MEDICINE CLINIC | Age: 67
End: 2022-12-06
Payer: COMMERCIAL

## 2022-12-06 VITALS
DIASTOLIC BLOOD PRESSURE: 70 MMHG | BODY MASS INDEX: 31.53 KG/M2 | HEART RATE: 88 BPM | SYSTOLIC BLOOD PRESSURE: 122 MMHG | WEIGHT: 156.4 LBS | OXYGEN SATURATION: 97 % | HEIGHT: 59 IN

## 2022-12-06 DIAGNOSIS — M25.511 CHRONIC RIGHT SHOULDER PAIN: ICD-10-CM

## 2022-12-06 DIAGNOSIS — F41.9 ANXIETY AND DEPRESSION: ICD-10-CM

## 2022-12-06 DIAGNOSIS — F32.A ANXIETY AND DEPRESSION: ICD-10-CM

## 2022-12-06 DIAGNOSIS — Z00.00 ANNUAL PHYSICAL EXAM: Primary | ICD-10-CM

## 2022-12-06 DIAGNOSIS — E55.9 VITAMIN D DEFICIENCY: ICD-10-CM

## 2022-12-06 DIAGNOSIS — E78.2 MIXED HYPERLIPIDEMIA: ICD-10-CM

## 2022-12-06 DIAGNOSIS — A60.00 GENITAL HERPES SIMPLEX, UNSPECIFIED SITE: ICD-10-CM

## 2022-12-06 DIAGNOSIS — M54.50 LUMBAR PAIN: ICD-10-CM

## 2022-12-06 DIAGNOSIS — G89.29 CHRONIC RIGHT SHOULDER PAIN: ICD-10-CM

## 2022-12-06 DIAGNOSIS — I10 ESSENTIAL HYPERTENSION: ICD-10-CM

## 2022-12-06 DIAGNOSIS — R92.8 ABNORMAL MAMMOGRAM: ICD-10-CM

## 2022-12-06 DIAGNOSIS — Z00.00 INITIAL MEDICARE ANNUAL WELLNESS VISIT: ICD-10-CM

## 2022-12-06 DIAGNOSIS — E10.9 TYPE 1 DIABETES MELLITUS WITHOUT COMPLICATION (HCC): ICD-10-CM

## 2022-12-06 DIAGNOSIS — F43.10 PTSD (POST-TRAUMATIC STRESS DISORDER): ICD-10-CM

## 2022-12-06 DIAGNOSIS — J30.89 SEASONAL ALLERGIC RHINITIS DUE TO OTHER ALLERGIC TRIGGER: ICD-10-CM

## 2022-12-06 DIAGNOSIS — R19.5 POSITIVE FIT (FECAL IMMUNOCHEMICAL TEST): ICD-10-CM
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
## 2022-12-06 PROCEDURE — G8484 FLU IMMUNIZE NO ADMIN: HCPCS | Performed by: NURSE PRACTITIONER

## 2022-12-06 PROCEDURE — G0438 PPPS, INITIAL VISIT: HCPCS | Performed by: NURSE PRACTITIONER

## 2022-12-06 PROCEDURE — 3074F SYST BP LT 130 MM HG: CPT | Performed by: NURSE PRACTITIONER

## 2022-12-06 PROCEDURE — 1123F ACP DISCUSS/DSCN MKR DOCD: CPT | Performed by: NURSE PRACTITIONER

## 2022-12-06 PROCEDURE — 3017F COLORECTAL CA SCREEN DOC REV: CPT | Performed by: NURSE PRACTITIONER

## 2022-12-06 PROCEDURE — 3044F HG A1C LEVEL LT 7.0%: CPT | Performed by: NURSE PRACTITIONER

## 2022-12-06 PROCEDURE — 3078F DIAST BP <80 MM HG: CPT | Performed by: NURSE PRACTITIONER

## 2022-12-06 RX ORDER — CHOLECALCIFEROL (VITAMIN D3) 125 MCG
CAPSULE ORAL
Qty: 120 TABLET | Refills: 0 | Status: SHIPPED | OUTPATIENT
Start: 2022-12-06

## 2022-12-06 RX ORDER — PRAVASTATIN SODIUM 40 MG
TABLET ORAL
Qty: 90 TABLET | Refills: 1 | Status: SHIPPED | OUTPATIENT
Start: 2022-12-06

## 2022-12-06 RX ORDER — SERTRALINE HYDROCHLORIDE 100 MG/1
TABLET, FILM COATED ORAL
Qty: 90 TABLET | Refills: 0 | Status: SHIPPED | OUTPATIENT
Start: 2022-12-06

## 2022-12-06 RX ORDER — ACYCLOVIR 200 MG/1
CAPSULE ORAL
Qty: 120 CAPSULE | Refills: 0 | Status: SHIPPED | OUTPATIENT
Start: 2022-12-06

## 2022-12-06 RX ORDER — AMITRIPTYLINE HYDROCHLORIDE 100 MG/1
100 TABLET, FILM COATED ORAL NIGHTLY
Qty: 90 TABLET | Refills: 0 | Status: SHIPPED | OUTPATIENT
Start: 2022-12-06

## 2022-12-06 RX ORDER — CYCLOBENZAPRINE HCL 5 MG
TABLET ORAL
Qty: 90 TABLET | Refills: 0 | Status: SHIPPED | OUTPATIENT
Start: 2022-12-06

## 2022-12-06 SDOH — ECONOMIC STABILITY: FOOD INSECURITY: WITHIN THE PAST 12 MONTHS, THE FOOD YOU BOUGHT JUST DIDN'T LAST AND YOU DIDN'T HAVE MONEY TO GET MORE.: NEVER TRUE

## 2022-12-06 SDOH — ECONOMIC STABILITY: FOOD INSECURITY: WITHIN THE PAST 12 MONTHS, YOU WORRIED THAT YOUR FOOD WOULD RUN OUT BEFORE YOU GOT MONEY TO BUY MORE.: NEVER TRUE

## 2022-12-06 ASSESSMENT — LIFESTYLE VARIABLES
HOW OFTEN DO YOU HAVE A DRINK CONTAINING ALCOHOL: NEVER
HOW MANY STANDARD DRINKS CONTAINING ALCOHOL DO YOU HAVE ON A TYPICAL DAY: PATIENT DOES NOT DRINK

## 2022-12-06 ASSESSMENT — ENCOUNTER SYMPTOMS
ABDOMINAL PAIN: 0
CONSTIPATION: 0
COUGH: 0
SHORTNESS OF BREATH: 0
WHEEZING: 0
VOMITING: 0
DIARRHEA: 0
NAUSEA: 0

## 2022-12-06 ASSESSMENT — PATIENT HEALTH QUESTIONNAIRE - PHQ9
SUM OF ALL RESPONSES TO PHQ QUESTIONS 1-9: 0
8. MOVING OR SPEAKING SO SLOWLY THAT OTHER PEOPLE COULD HAVE NOTICED. OR THE OPPOSITE, BEING SO FIGETY OR RESTLESS THAT YOU HAVE BEEN MOVING AROUND A LOT MORE THAN USUAL: 0
4. FEELING TIRED OR HAVING LITTLE ENERGY: 0
2. FEELING DOWN, DEPRESSED OR HOPELESS: 0
SUM OF ALL RESPONSES TO PHQ QUESTIONS 1-9: 0
5. POOR APPETITE OR OVEREATING: 0
6. FEELING BAD ABOUT YOURSELF - OR THAT YOU ARE A FAILURE OR HAVE LET YOURSELF OR YOUR FAMILY DOWN: 0
3. TROUBLE FALLING OR STAYING ASLEEP: 0
SUM OF ALL RESPONSES TO PHQ9 QUESTIONS 1 & 2: 0
SUM OF ALL RESPONSES TO PHQ QUESTIONS 1-9: 0
10. IF YOU CHECKED OFF ANY PROBLEMS, HOW DIFFICULT HAVE THESE PROBLEMS MADE IT FOR YOU TO DO YOUR WORK, TAKE CARE OF THINGS AT HOME, OR GET ALONG WITH OTHER PEOPLE: 0
1. LITTLE INTEREST OR PLEASURE IN DOING THINGS: 0
9. THOUGHTS THAT YOU WOULD BE BETTER OFF DEAD, OR OF HURTING YOURSELF: 0
SUM OF ALL RESPONSES TO PHQ QUESTIONS 1-9: 0
7. TROUBLE CONCENTRATING ON THINGS, SUCH AS READING THE NEWSPAPER OR WATCHING TELEVISION: 0

## 2022-12-06 ASSESSMENT — SOCIAL DETERMINANTS OF HEALTH (SDOH): HOW HARD IS IT FOR YOU TO PAY FOR THE VERY BASICS LIKE FOOD, HOUSING, MEDICAL CARE, AND HEATING?: NOT HARD AT ALL

## 2022-12-06 NOTE — PROGRESS NOTES
Medicare Annual Wellness Visit    Lucía Bates is here for Annual Exam and Medicare AWV    Assessment & Plan   Annual physical exam  -     Comprehensive Metabolic Panel; Future  -     CBC with Auto Differential; Future  Type 1 diabetes mellitus without complication (HCC)  -     Diabetic Foot Exam  Essential hypertension  Anxiety and depression  -     TSH with Reflex to FT4; Future  Mixed hyperlipidemia  -     pravastatin (PRAVACHOL) 40 MG tablet; Take 1 tablet by mouth once daily, Disp-90 tablet, R-1Normal  -     Lipid, Fasting; Future  Genital herpes simplex, unspecified site  -     acyclovir (ZOVIRAX) 200 MG capsule; Take 2 capsules by mouth twice daily, Disp-120 capsule, R-0Normal  Vitamin D deficiency  -     Vitamin D 25 Hydroxy; Future  Seasonal allergic rhinitis due to other allergic trigger  Lumbar pain  Chronic right shoulder pain  -     cyclobenzaprine (FLEXERIL) 5 MG tablet; TAKE 1 TABLET BY MOUTH NIGHTLY AS NEEDED FOR MUSCLE SPASM, Disp-90 tablet, R-0Normal  Fall, sequela  PTSD (post-traumatic stress disorder)  -     amitriptyline (ELAVIL) 100 MG tablet; Take 1 tablet by mouth nightly, Disp-90 tablet, R-0Normal  -     sertraline (ZOLOFT) 100 MG tablet; TAKE 1 TABLET BY MOUTH ONCE DAILY, Disp-90 tablet, R-0Normal  Positive FIT (fecal immunochemical test)  Abnormal mammogram  Initial Medicare annual wellness visit    Recommendations for Preventive Services Due: see orders and patient instructions/AVS.  Recommended screening schedule for the next 5-10 years is provided to the patient in written form: see Patient Instructions/AVS.     Return in 6 months (on 6/6/2023) for HTN/mood/HLD/shingles vaccine f/u, or sooner if needed. Subjective   See other OV note dated 12/06/22    Patient's complete Health Risk Assessment and screening values have been reviewed and are found in Flowsheets.  The following problems were reviewed today and where indicated follow up appointments were made and/or referrals ordered. Positive Risk Factor Screenings with Interventions:    Fall Risk:  Do you feel unsteady or are you worried about falling? : no  2 or more falls in past year?: (!) yes  Fall with injury in past year?: (!) yes         Safety reviewed. Patient reports these were unfortunate circumstances and she does not feel unsteady on her feet. Drug Use Screening: DAST-10 Score: 1  DAST-10 Score Interpretation:  1-2: Low level - Monitor, re-assess at a later date; 3-5: Moderate level - Further Investigation; 6-8: Substantial level - Intensive Assessment; 9-10: Severe level - Intensive Assessment     General Health and ACP:  General  In general, how would you say your health is?: Good  In the past 7 days, have you experienced any of the following: New or Increased Pain, New or Increased Fatigue, Loneliness, Social Isolation, Stress or Anger?: No  Do you get the social and emotional support that you need?: Yes  Do you have a Living Will?: Yes    Advance Directives       Power of  Living Will ACP-Advance Directive ACP-Power of     Not on File Not on File Not on File Not on File        General Health Risk Interventions:  No Living Will: ACP documents already completed- patient asked to provide copy to the office    Health Habits/Nutrition:  Physical Activity: Insufficiently Active    Days of Exercise per Week: 1 day    Minutes of Exercise per Session: 20 min     Have you lost any weight without trying in the past 3 months?: No  Body mass index: (!) 31.59  Have you seen the dentist within the past year?: Appointment is scheduled  Health Habits/Nutrition Interventions:  Lifestyle modifications such as exercise, weight loss and healthy diet encouraged and reviewed with the pt. Objective   Vitals:    12/06/22 1008   BP: 122/70   Pulse: 88   SpO2: 97%   Weight: 156 lb 6.4 oz (70.9 kg)   Height: 4' 11\" (1.499 m)      Body mass index is 31.59 kg/m².              Allergies   Allergen Reactions Atorvastatin Swelling    Lisinopril Other (See Comments)    Naproxen Nausea Only    Oxycodone-Acetaminophen Other (See Comments)     Prior to Visit Medications    Medication Sig Taking? Authorizing Provider   acyclovir (ZOVIRAX) 200 MG capsule Take 2 capsules by mouth twice daily Yes BHAVANA Shipman CNP   amitriptyline (ELAVIL) 100 MG tablet Take 1 tablet by mouth nightly Yes BHAVANA Shipman CNP   cyclobenzaprine (FLEXERIL) 5 MG tablet TAKE 1 TABLET BY MOUTH NIGHTLY AS NEEDED FOR MUSCLE SPASM Yes BHAVANA Shipman CNP   Cholecalciferol (VITAMIN D3) 50 MCG (2000 UT) TABS TAKE 2 TABLETS BY MOUTH ONCE DAILY WITH BREAKFAST Yes BHAVANA Shipman CNP   sertraline (ZOLOFT) 100 MG tablet TAKE 1 TABLET BY MOUTH ONCE DAILY Yes BHAVANA Shipman CNP   pravastatin (PRAVACHOL) 40 MG tablet Take 1 tablet by mouth once daily Yes BHAVANA Shipman CNP   gabapentin (NEURONTIN) 100 MG capsule Take 1 capsule by mouth nightly for 60 days.  Intended supply: 30 days Yes Bina Sabillon MD   celecoxib (CELEBREX) 200 MG capsule Take 1 capsule by mouth daily as needed for Pain Yes Bina aSbillon MD   Calcium-Magnesium-Zinc 283-545-9 MG TABS Take 1 tablet by mouth daily Yes Historical Provider, MD   fluticasone (FLONASE) 50 MCG/ACT nasal spray 1 spray by Each Nostril route daily Yes BHAVANA Shipman CNP   insulin glargine (LANTUS) 100 UNIT/ML injection vial Inject 50 Units into the skin every morning Yes Historical Provider, MD   Calcium-Vitamins C & D (CALCIUM/C/D PO) Take by mouth Yes Historical Provider, MD   Multiple Vitamins-Minerals (MULTI-AFIA PO) Take by mouth Yes Historical Provider, MD   Glucosamine-MSM-Hyaluronic Acd (JOINT HEALTH PO) Take by mouth Yes Historical Provider, MD   aspirin 81 MG tablet Take 81 mg by mouth daily Yes Historical Provider, MD   insulin lispro (HUMALOG) 100 UNIT/ML injection vial Inject 15 Units into the skin 3 times daily (before meals) Indications: with sliding scale Sliding scal   <200 = 0 units   221-250 = 1 unit  251-300 = 2 units   301 - 350 = 3 units   351-400= 4 units   401-430 = 5 units Yes Historical Provider, MD Dias (Including outside providers/suppliers regularly involved in providing care):   Patient Care Team:  BHAVANA Gonzales CNP as PCP - General (Nurse Practitioner)  BHAVANA Gonzales CNP as PCP - [facility] Empaneled Provider     Reviewed and updated this visit:  Tobacco  Allergies  Meds  Med Hx  Surg Hx  Soc Hx  Fam Hx        All questions answered. Patient states no further questions or concerns at this time.   Electronically signed by: BHAVANA Gonzales CNP 12/06/22

## 2022-12-06 NOTE — PROGRESS NOTES
Annual Physical Exam Office Visit  12/6/2022    Subjective:  Chief Complaint   Patient presents with    Annual Exam    Medicare AWV     HPI:   Jayjay Mike is a 74 y.o. female who presents to the clinic today for an annual physical exam/AWV. T1DM- She follows with endocrine - Dr. Azul Nuñez. Asymptomatic. Has diabetic eye exam scheduled Jan 2023. HTN- using lifestyle modifications. Walking steps for exercise. Taking BP at home- reported as 123-130/70-80. Asymptomatic. Denies chest pain, palpitations, shortness of breath, trouble breathing, lightheadedness, dizziness or blurred vision. Depression/PTSD/Anxiety-Pt is taking Zoloft 100 mg daily and amitriptyline 100 mg nightly. States she has been on this regimen for years and she would not like to switch. States her mood is doing \"pretty good. \" Denies SI/HI. Seeing psychology. HLD- Allergy to atorvastatin. Taking pravastatin 40 mg without concerns/without side effects. Genital herpes- Pt reports that she takes acyclovir 400 mg once nightly- prescribed BID. She states she increases the dose with a flare- no recent flare. Vit D deficiency- taking supplements daily. Asymptomatic. Allergic rhinitis- well controlled with flonase. No side effects. Positive FIT test- had a colonoscopy and needs a \"two day prep. \" Plans to schedule. Was in the ER for a fall r/t right knee pains. She fell and according to chart, \"CT imaging was read by radiology to have joint effusion and Baker's cyst.\" She states that she was in a knee immobilizer and then saw ortho. States she fell over a stool. No falls since. Low back pains- seeing ortho. Performed PT. Getting epidurals. Seeing Dr. Corey Ellis. Right shoulder pains- present for years. Takes flexeril PRN-typically once nightly. States she is not interested in cutting down- has been on this regimen for years. Moved from [place] to [place]. Has a daughter, son-in-law and grandchildren live with her. for Pain 30 capsule 3    Calcium-Magnesium-Zinc 333-133-5 MG TABS Take 1 tablet by mouth daily      fluticasone (FLONASE) 50 MCG/ACT nasal spray 1 spray by Each Nostril route daily 1 each 2    insulin glargine (LANTUS) 100 UNIT/ML injection vial Inject 50 Units into the skin every morning      Calcium-Vitamins C & D (CALCIUM/C/D PO) Take by mouth      Multiple Vitamins-Minerals (MULTI-AFIA PO) Take by mouth      Glucosamine-MSM-Hyaluronic Acd (JOINT HEALTH PO) Take by mouth      aspirin 81 MG tablet Take 81 mg by mouth daily      insulin lispro (HUMALOG) 100 UNIT/ML injection vial Inject 15 Units into the skin 3 times daily (before meals) Indications: with sliding scale Sliding scal   <200 = 0 units   221-250 = 1 unit  251-300 = 2 units   301 - 350 = 3 units   351-400= 4 units   401-430 = 5 units       Social History     Socioeconomic History    Marital status: Legally      Spouse name: Not on file    Number of children: Not on file    Years of education: Not on file    Highest education level: Not on file   Occupational History    Not on file   Tobacco Use    Smoking status: Never    Smokeless tobacco: Never   Vaping Use    Vaping Use: Never used   Substance and Sexual Activity    Alcohol use: Not Currently    Drug use: Never    Sexual activity: Not Currently   Other Topics Concern    Not on file   Social History Narrative    Moved from [place] to [place]. Has a daughter, son-in-law and grandchildren live with her.       Social Determinants of Health     Financial Resource Strain: Low Risk     Difficulty of Paying Living Expenses: Not hard at all   Food Insecurity: No Food Insecurity    Worried About Running Out of Food in the Last Year: Never true    Ran Out of Food in the Last Year: Never true   Transportation Needs: Not on file   Physical Activity: Insufficiently Active    Days of Exercise per Week: 1 day    Minutes of Exercise per Session: 20 min   Stress: Not on file   Social Connections: Not on file Intimate Partner Violence: Not on file   Housing Stability: Not on file     Past Medical History:   Diagnosis Date    Anxiety     Depression     Diabetes mellitus ([facility] [address]     Genital herpes     Hyperlipidemia     Hypertension     PTSD (post-traumatic stress disorder)     Vitamin D deficiency      Patient Active Problem List   Diagnosis    Anxiety and depression    PTSD (post-traumatic stress disorder)    Type 2 diabetes mellitus without complication, with long-term current use of insulin ([facility] [address]    Essential hypertension    Genital herpes simplex    Hyperlipidemia      Wt Readings from Last 3 Encounters:   12/06/22 156 lb 6.4 oz (70.9 kg)   11/14/22 156 lb 4.9 oz (70.9 kg)   11/03/22 156 lb 4.9 oz (70.9 kg)     BP Readings from Last 3 Encounters:   12/06/22 122/70   06/28/22 130/70   05/10/22 130/70     The 10-year ASCVD risk score (Melinda LAZARO, et al., 2019) is: 9.3%    Values used to calculate the score:      Age: 74 years      Sex: Female      Is Non- : No      Diabetic: Yes      Tobacco smoker: No      Systolic Blood Pressure: 466 mmHg      Is BP treated: No      HDL Cholesterol: 58 mg/dL      Total Cholesterol: 197 mg/dL    PHQ-9 Total Score: 0 (12/6/2022 10:13 AM)  Thoughts that you would be better off dead, or of hurting yourself in some way: 0 (12/6/2022 10:13 AM)    Objective/Physical Exam:  /70   Pulse 88   Ht 4' 11\" (1.499 m)   Wt 156 lb 6.4 oz (70.9 kg)   SpO2 97%   BMI 31.59 kg/m²   Body mass index is 31.59 kg/m². Physical Exam  Vitals reviewed. Constitutional:       General: She is not in acute distress. Appearance: She is well-developed. She is not diaphoretic. HENT:      Head: Normocephalic and atraumatic. Eyes:      Pupils: Pupils are equal, round, and reactive to light. Cardiovascular:      Rate and Rhythm: Normal rate and regular rhythm. Pulmonary:      Effort: Pulmonary effort is normal. No respiratory distress.       Breath sounds: Normal breath sounds. No wheezing or rales. Chest:      Chest wall: No tenderness. Abdominal:      General: Bowel sounds are normal.      Palpations: Abdomen is soft. Skin:     General: Skin is warm and dry. Comments: Diabetic foot exam: 2+ DP and PT pulses. Sensation intact to monofilament testing. Small wart noted to the left heel. No callous or ulcers visualized; toenails are normal in appearance, proprioception intact in the bilateral feet   Neurological:      Mental Status: She is alert and oriented to person, place, and time. Coordination: Coordination normal.   Psychiatric:         Mood and Affect: Mood normal.     Assessment and Plan:  Contreras Keith was seen today for annual exam and medicare awv. Diagnoses and all orders for this visit:    Annual physical exam  -     Comprehensive Metabolic Panel; Future  -     CBC with Auto Differential; Future    Type 1 diabetes mellitus without complication ([facility] [address]  -     Diabetic Foot Exam- see physical exam.  - Continue with endocrine.  - Has diabetic eye exam scheduled per pt. Essential hypertension   - BP stable. - Continue with lifestyle modifications. Mixed hyperlipidemia  -     denies side effects. Will re-evaluate. - pravastatin (PRAVACHOL) 40 MG tablet; Take 1 tablet by mouth once daily  -     Lipid, Fasting; Future    Genital herpes simplex, unspecified site  -     no recent flares. Asymptomatic. Denies side effects  - Continue current regimen. - acyclovir (ZOVIRAX) 200 MG capsule; Take 2 capsules by mouth twice daily    Vitamin D deficiency  -     asymptomatic. Will re-evaluate. - Vitamin D 25 Hydroxy; Future  -     Cholecalciferol (VITAMIN D3) 50 MCG (2000 UT) TABS; TAKE 2 TABLETS BY MOUTH ONCE DAILY WITH BREAKFAST    Seasonal allergic rhinitis due to other allergic trigger   -  Well controlled. Lumbar pain   - Continue with ortho    Chronic right shoulder pain  -     well controlled with as needed use. Would like to continue on this regimen.  Denies side effects  - Continue current regimen  - cyclobenzaprine (FLEXERIL) 5 MG tablet; TAKE 1 TABLET BY MOUTH NIGHTLY AS NEEDED FOR MUSCLE SPASM    Fall, sequela   - Safety reviewed. PTSD (post-traumatic stress disorder)/Anxiety and depression  -     Pt states she has been on this regimen for years and she is not interested in changing. Mood well controlled. Denies side effects  - Continue current regimen  - amitriptyline (ELAVIL) 100 MG tablet; Take 1 tablet by mouth nightly  -     sertraline (ZOLOFT) 100 MG tablet; TAKE 1 TABLET BY MOUTH ONCE DAILY  -     TSH with Reflex to FT4; Future    Positive FIT (fecal immunochemical test)   - Pt agreeable to scheduling colonoscopy. Abnormal mammogram   - Reviewed with the pt. Additional imaging ordered. Pt agreeable to schedule. Initial Medicare annual wellness visit   - See other OV note dated 12/06/22      Wart- noted on foot exam. reviewed with the pt. Pt agreeable to OTC wart treatment. She is agreeable to monitor and call if symptoms worsen/fail to improve. Return in 6 months (on 6/6/2023) for HTN/mood/HLD/shingles vaccine f/u, or sooner if needed. Pt will call if symptoms worsen or fail to improve. All questions answered. Pt states no further questions or concerns at this time.    Electronically signed by: BHAVANA Grove - CNP 12/06/22

## 2022-12-06 NOTE — PATIENT INSTRUCTIONS
Call to schedule mammogram for January      Please get your fasting lab work (no food or drink for 10-12 hours prior besides water) completed M-F [id number] at our office. [facility] lab has walk-in hours available as well - no appointment is needed. We will call or mychart message you with your results. Personalized Preventive Plan for Mandi Rea - 12/6/2022  Medicare offers a range of preventive health benefits. Some of the tests and screenings are paid in full while other may be subject to a deductible, co-insurance, and/or copay. Some of these benefits include a comprehensive review of your medical history including lifestyle, illnesses that may run in your family, and various assessments and screenings as appropriate. After reviewing your medical record and screening and assessments performed today your provider may have ordered immunizations, labs, imaging, and/or referrals for you. A list of these orders (if applicable) as well as your Preventive Care list are included within your After Visit Summary for your review. Other Preventive Recommendations:    A preventive eye exam performed by an eye specialist is recommended every 1-2 years to screen for glaucoma; cataracts, macular degeneration, and other eye disorders. A preventive dental visit is recommended every 6 months. Try to get at least 150 minutes of exercise per week or 10,000 steps per day on a pedometer . Order or download the FREE \"Exercise & Physical Activity: Your Everyday Guide\" from [employer]. Call 9-661.324.6451 or search [employer] online. You need 8196-6247 mg of calcium and 9749-8759 IU of vitamin D per day. It is possible to meet your calcium requirement with diet alone, but a vitamin D supplement is usually necessary to meet this goal.  When exposed to the sun, use a sunscreen that protects against both UVA and UVB radiation with an SPF of 30 or greater.  Reapply every 2 to 3 hours or after sweating, drying off with a towel, or swimming. Always wear a seat belt when traveling in a car. Always wear a helmet when riding a bicycle or motorcycle.

## 2022-12-09 LAB
ALBUMIN SERPL-MCNC: 4.1 G/DL
ALP BLD-CCNC: 4.3 U/L
ALT SERPL-CCNC: NORMAL U/L
ANION GAP SERPL CALCULATED.3IONS-SCNC: 7 MMOL/L
AST SERPL-CCNC: NORMAL U/L
AVERAGE GLUCOSE: NORMAL
BILIRUB SERPL-MCNC: NORMAL MG/DL
BUN BLDV-MCNC: 16 MG/DL
CALCIUM SERPL-MCNC: 9.8 MG/DL
CHLORIDE BLD-SCNC: 103 MMOL/L
CO2: 30 MMOL/L
CREAT SERPL-MCNC: 0.7 MG/DL
CREATININE, URINE: 185.2
EGFR: >90
GLUCOSE BLD-MCNC: 233 MG/DL
HBA1C MFR BLD: 6.9 %
MICROALBUMIN/CREAT 24H UR: 17.5 MG/G{CREAT}
MICROALBUMIN/CREAT UR-RTO: 9.4
POTASSIUM SERPL-SCNC: 4.3 MMOL/L
SODIUM BLD-SCNC: 140 MMOL/L
TOTAL PROTEIN: NORMAL

## 2022-12-12 ENCOUNTER — OFFICE VISIT (OUTPATIENT)
Dept: ORTHOPEDIC SURGERY | Age: 67
End: 2022-12-12
Payer: MEDICARE

## 2022-12-12 VITALS — WEIGHT: 156.31 LBS | HEIGHT: 59 IN | BODY MASS INDEX: 31.51 KG/M2

## 2022-12-12 DIAGNOSIS — S83.91XD SPRAIN OF RIGHT KNEE, UNSPECIFIED LIGAMENT, SUBSEQUENT ENCOUNTER: ICD-10-CM

## 2022-12-12 DIAGNOSIS — R20.2 PARESTHESIA OF RIGHT UPPER EXTREMITY: ICD-10-CM

## 2022-12-12 DIAGNOSIS — S80.01XD CONTUSION OF RIGHT KNEE, SUBSEQUENT ENCOUNTER: Primary | ICD-10-CM

## 2022-12-12 PROCEDURE — 1123F ACP DISCUSS/DSCN MKR DOCD: CPT | Performed by: INTERNAL MEDICINE

## 2022-12-12 PROCEDURE — G8484 FLU IMMUNIZE NO ADMIN: HCPCS | Performed by: INTERNAL MEDICINE

## 2022-12-12 PROCEDURE — 1036F TOBACCO NON-USER: CPT | Performed by: INTERNAL MEDICINE

## 2022-12-12 PROCEDURE — G8399 PT W/DXA RESULTS DOCUMENT: HCPCS | Performed by: INTERNAL MEDICINE

## 2022-12-12 PROCEDURE — 99214 OFFICE O/P EST MOD 30 MIN: CPT | Performed by: INTERNAL MEDICINE

## 2022-12-12 PROCEDURE — 3017F COLORECTAL CA SCREEN DOC REV: CPT | Performed by: INTERNAL MEDICINE

## 2022-12-12 PROCEDURE — G8427 DOCREV CUR MEDS BY ELIG CLIN: HCPCS | Performed by: INTERNAL MEDICINE

## 2022-12-12 PROCEDURE — 1090F PRES/ABSN URINE INCON ASSESS: CPT | Performed by: INTERNAL MEDICINE

## 2022-12-12 PROCEDURE — G8417 CALC BMI ABV UP PARAM F/U: HCPCS | Performed by: INTERNAL MEDICINE

## 2022-12-15 NOTE — PROGRESS NOTES
Chief Complaint:   Chief Complaint   Patient presents with    Leg Pain     F/U R leg, feeling about 80-85% better overall. Stopped using brace about 10 daysd ago and only uses cane when going out shopping. Stretches and exercises have been helpful. End of the day the leg pain usually worsens. Wrist Pain     R wrist pain          History of Present Illness:       Patient is a 74 y.o. female returns follow up for the above complaint. The patient was last seen approximately 1 monthsago. The symptoms are improving since the last visit. The patient has had no further testing for the problem. Increasingly more functional and essentially pain-free she is very pleased with the improvement    She has transition from cane assisted weightbearing and only as needed use of functional knee bracing    No reliance on narcotic analgesia    Her second complaint relates to neuritic symptoms involving the right that seem to follow a median nerve distribution.     She denies any correlation with head neck range of motion    She has no known history of carpal tunnel syndrome    She denies any new onset or progressive weakness of the right hand or upper extremity         Past Medical History:        Past Medical History:   Diagnosis Date    Anxiety     Depression     Diabetes mellitus (HCC)     Genital herpes     Hyperlipidemia     Hypertension     PTSD (post-traumatic stress disorder)     Vitamin D deficiency         Present Medications:         Current Outpatient Medications   Medication Sig Dispense Refill    acyclovir (ZOVIRAX) 200 MG capsule Take 2 capsules by mouth twice daily 120 capsule 0    amitriptyline (ELAVIL) 100 MG tablet Take 1 tablet by mouth nightly 90 tablet 0    cyclobenzaprine (FLEXERIL) 5 MG tablet TAKE 1 TABLET BY MOUTH NIGHTLY AS NEEDED FOR MUSCLE SPASM 90 tablet 0    Cholecalciferol (VITAMIN D3) 50 MCG (2000 UT) TABS TAKE 2 TABLETS BY MOUTH ONCE DAILY WITH BREAKFAST 120 tablet 0    sertraline (ZOLOFT) 100 MG tablet TAKE 1 TABLET BY MOUTH ONCE DAILY 90 tablet 0    pravastatin (PRAVACHOL) 40 MG tablet Take 1 tablet by mouth once daily 90 tablet 1    gabapentin (NEURONTIN) 100 MG capsule Take 1 capsule by mouth nightly for 60 days. Intended supply: 30 days 30 capsule 1    celecoxib (CELEBREX) 200 MG capsule Take 1 capsule by mouth daily as needed for Pain 30 capsule 3    Calcium-Magnesium-Zinc 333-133-5 MG TABS Take 1 tablet by mouth daily      fluticasone (FLONASE) 50 MCG/ACT nasal spray 1 spray by Each Nostril route daily 1 each 2    insulin glargine (LANTUS) 100 UNIT/ML injection vial Inject 50 Units into the skin every morning      Calcium-Vitamins C & D (CALCIUM/C/D PO) Take by mouth      Multiple Vitamins-Minerals (MULTI-AFIA PO) Take by mouth      Glucosamine-MSM-Hyaluronic Acd (JOINT HEALTH PO) Take by mouth      aspirin 81 MG tablet Take 81 mg by mouth daily      insulin lispro (HUMALOG) 100 UNIT/ML injection vial Inject 15 Units into the skin 3 times daily (before meals) Indications: with sliding scale Sliding scal   <200 = 0 units   221-250 = 1 unit  251-300 = 2 units   301 - 350 = 3 units   351-400= 4 units   401-430 = 5 units       No current facility-administered medications for this visit. Allergies: Allergies   Allergen Reactions    Atorvastatin Swelling    Lisinopril Other (See Comments)    Naproxen Nausea Only    Oxycodone-Acetaminophen Other (See Comments)           Review of Systems:    Pertinent items are noted in HPI        Vital Signs: There were no vitals filed for this visit.      General Exam:     Constitutional: Patient is adequately groomed with no evidence of malnutrition    Physical Exam: right knee      Primary Exam:    Inspection: Interval resolution of intra-articular effusion      Palpation: No focal tenderness      Range of Motion: 0/130      Strength: Normal with SLR      Special Tests: Lachman test negative collateral ligament stressing stable      Skin: There are no rashes, ulcerations or lesions. Gait: Nonantalgic insert neuro          Additional Comments:        Additional Examinations:           Cervical spine-    No deformity atrophy appreciable curvature  Mild global restriction in all ranges without pain  Neurologic -Light touch sensation and manual muscle testing is normal C5-C8 . Biceps and triceps reflexes are symmetric and +2. Spurlling sign is negative         Office Imaging Results/Procedures PerformedToday:           Office Procedures:     Orders Placed This Encounter   Procedures    EMG     EMG - RUE - R/O CTS/ RADIC     Standing Status:   Future     Standing Expiration Date:   12/12/2023     Scheduling Instructions:      [facility]- Jolene Riddle MD      [address] [place] [address],       [address], [address]      [phone]            Please call [facility] office to schedule your appt. This is your responsibility to schedule, since it is a test order and not a referral.      Results will be sent to Dr. Suyapa Mckenzie within 24 hours, so you may schedule your follow up appt 1-2 days after your EMG to go over your results in the clinic. Please call us to schedule your follow up at [phone]. Order Specific Question:   Which body part? Answer:   RUE       Other Outside Imaging and Testing Personally Reviewed:    No results found. Assessment   Impression: . Encounter Diagnoses   Name Primary?     Contusion of right knee, subsequent encounter Yes    Sprain of right knee, unspecified ligament, subsequent encounter     Paresthesia of right upper extremity               Plan:   Gradually resume full activities with respect to the right knee caution against overuse  Consider formal course of PT only as needed and as needed use of functional knee bracing  EMG evaluation right upper extremity evaluate for radiculopathy/entrapment neuropathy  Continue gabapentin at current dosage impairing use of Celebrex       Orders:        Orders Placed This Encounter   Procedures    EMG     EMG - RUE - R/O CTS/ RADIC     Standing Status:   Future     Standing Expiration Date:   12/12/2023     Scheduling Instructions:      [facility]- Priyanka Rivera MD      [address] [place] [address], [address] [place] [address]      [phone]            Please call Dr. Duane Evans office to schedule your appt. This is your responsibility to schedule, since it is a test order and not a referral.      Results will be sent to Dr. Viktoria Ramos within 24 hours, so you may schedule your follow up appt 1-2 days after your EMG to go over your results in the clinic. Please call us to schedule your follow up at [phone]. Order Specific Question:   Which body part? Answer:   Soraya Dorsey MD.      Disclaimer: \"This note was dictated with voice recognition software. Though review and correction are routine, we apologize for any errors. \"

## 2023-01-05 DIAGNOSIS — J30.89 SEASONAL ALLERGIC RHINITIS DUE TO OTHER ALLERGIC TRIGGER: ICD-10-CM

## 2023-01-06 RX ORDER — FLUTICASONE PROPIONATE 50 MCG
SPRAY, SUSPENSION (ML) NASAL
Qty: 16 G | Refills: 0 | OUTPATIENT
Start: 2023-01-06

## 2023-01-16 ENCOUNTER — PROCEDURE VISIT (OUTPATIENT)
Dept: NEUROLOGY | Age: 68
End: 2023-01-16
Payer: MEDICARE

## 2023-01-16 DIAGNOSIS — R20.2 PARESTHESIA OF RIGHT UPPER EXTREMITY: ICD-10-CM

## 2023-01-16 PROCEDURE — 95886 MUSC TEST DONE W/N TEST COMP: CPT | Performed by: PSYCHIATRY & NEUROLOGY

## 2023-01-16 PROCEDURE — 95909 NRV CNDJ TST 5-6 STUDIES: CPT | Performed by: PSYCHIATRY & NEUROLOGY

## 2023-01-16 NOTE — PROGRESS NOTES
Sully Frey M.D. [facility]) [facility]  Board Certified in [address], [address] [place] [address]    EMG / NERVE CONDUCTION STUDY      PATIENT:  Nan Islas       DATE OF EM23     YOB: 1955       REASON FOR EMG:   Right arm numbness      REFERRING PHYSICIAN:  MD Louie Jackson [address]     SUMMARY:   The right median sensory nerve study was not recordable. The right median motor nerve study had a prolonged distal latency and low amplitude. The right ulnar motor nerve study had a mild slowing of conduction velocity across the elbow. The right ulnar and radial sensory nerve studies were not recordable. Needle EMG of several muscles in the right upper extremity showed decreased motor units in the abductor pollicis brevis muscle. CLINICAL DIAGNOSIS:  Carpal tunnel syndrome versus radiculopathy        EMG RESULTS:     1. This patient has a moderately severe right median nerve lesion at the wrist.  (Carpal tunnel syndrome). 2.  There is also mild right ulnar nerve lesion at the elbow.        ---------------------------------------------  Sully Frey M.D.   Electromyographer / Neurologist

## 2023-01-16 NOTE — PATIENT INSTRUCTIONS
Verbal consent was obtained from patient and/or patient's advocate for in office procedure with Dr. Salavdor Landry (EMG or EEG).

## 2023-01-17 DIAGNOSIS — J30.89 SEASONAL ALLERGIC RHINITIS DUE TO OTHER ALLERGIC TRIGGER: ICD-10-CM

## 2023-01-17 DIAGNOSIS — A60.00 GENITAL HERPES SIMPLEX, UNSPECIFIED SITE: ICD-10-CM

## 2023-01-17 RX ORDER — ACYCLOVIR 200 MG/1
CAPSULE ORAL
Qty: 120 CAPSULE | Refills: 0 | Status: SHIPPED | OUTPATIENT
Start: 2023-01-17

## 2023-01-17 RX ORDER — FLUTICASONE PROPIONATE 50 MCG
1 SPRAY, SUSPENSION (ML) NASAL DAILY
Qty: 1 EACH | Refills: 2 | Status: SHIPPED | OUTPATIENT
Start: 2023-01-17

## 2023-01-17 NOTE — TELEPHONE ENCOUNTER
----- Message from [facility] sent at 1/17/2023 10:49 AM EST -----  Subject: Refill Request    QUESTIONS  Name of Medication? fluticasone (FLONASE) 50 MCG/ACT nasal spray  Patient-reported dosage and instructions? 1 spray by Each Nostril route   daily  How many days do you have left? 0  Preferred Pharmacy? [address]  Pharmacy phone number (if available)? [phone]  ---------------------------------------------------------------------------  --------------,  Name of Medication? acyclovir (ZOVIRAX) 200 MG capsule  Patient-reported dosage and instructions? Take 2 capsules by mouth twice   daily  How many days do you have left? 0  Preferred Pharmacy? [address]  Pharmacy phone number (if available)? [phone]  ---------------------------------------------------------------------------  --------------  Darl Boom INFO  What is the best way for the office to contact you? OK to leave message on   voicemail  Preferred Call Back Phone Number? [phone]  ---------------------------------------------------------------------------  --------------  SCRIPT ANSWERS  Relationship to Patient?  Self

## 2023-01-23 ENCOUNTER — OFFICE VISIT (OUTPATIENT)
Dept: ORTHOPEDIC SURGERY | Age: 68
End: 2023-01-23

## 2023-01-23 VITALS — BODY MASS INDEX: 31.51 KG/M2 | WEIGHT: 156.31 LBS | HEIGHT: 59 IN

## 2023-01-23 DIAGNOSIS — G56.11 NEURITIS OF RIGHT MEDIAN NERVE: ICD-10-CM

## 2023-01-23 DIAGNOSIS — G56.01 CARPAL TUNNEL SYNDROME, RIGHT: ICD-10-CM

## 2023-01-23 DIAGNOSIS — Z98.890 HISTORY OF CARPAL TUNNEL RELEASE OF BOTH WRISTS: ICD-10-CM

## 2023-01-23 NOTE — PROGRESS NOTES
Chief Complaint:   Chief Complaint   Patient presents with    Arm Pain     F/U EMG TR R arm, having burning and numbness in R hand. 5th digit is the only fingers not numb and burning, however if 5th digit is hit then it is extremely painful. Leg Pain     L leg pain feeling of pins and needles in L leg/ankle. History of Present Illness:       Patient is a 79 y.o. female returns follow up for the above complaint. The patient was last seen approximately 5 weeksago. The symptoms show no change since the last visit. The patient has had no further testing for the problem. She continues to experience numbness in the distribution of the median nerve of the right hand    Status post open carpal tunnel release x2    She does not perceive any progressive weakness    She will like to consider other treatment options we have previously discussed ultrasound-guided nerve hydrodissection.              Past Medical History:        Past Medical History:   Diagnosis Date    Anxiety     Depression     Diabetes mellitus ([facility] [address]     Genital herpes     Hyperlipidemia     Hypertension     PTSD (post-traumatic stress disorder)     Vitamin D deficiency         Present Medications:         Current Outpatient Medications   Medication Sig Dispense Refill    acyclovir (ZOVIRAX) 200 MG capsule Take 2 capsules by mouth twice daily 120 capsule 0    fluticasone (FLONASE) 50 MCG/ACT nasal spray 1 spray by Each Nostril route daily 1 each 2    amitriptyline (ELAVIL) 100 MG tablet Take 1 tablet by mouth nightly 90 tablet 0    cyclobenzaprine (FLEXERIL) 5 MG tablet TAKE 1 TABLET BY MOUTH NIGHTLY AS NEEDED FOR MUSCLE SPASM 90 tablet 0    Cholecalciferol (VITAMIN D3) 50 MCG (2000 UT) TABS TAKE 2 TABLETS BY MOUTH ONCE DAILY WITH BREAKFAST 120 tablet 0    sertraline (ZOLOFT) 100 MG tablet TAKE 1 TABLET BY MOUTH ONCE DAILY 90 tablet 0    pravastatin (PRAVACHOL) 40 MG tablet Take 1 tablet by mouth once daily 90 tablet 1    gabapentin (NEURONTIN) 100 MG capsule Take 1 capsule by mouth nightly for 60 days. Intended supply: 30 days 30 capsule 1    celecoxib (CELEBREX) 200 MG capsule Take 1 capsule by mouth daily as needed for Pain 30 capsule 3    Calcium-Magnesium-Zinc 333-133-5 MG TABS Take 1 tablet by mouth daily      insulin glargine (LANTUS) 100 UNIT/ML injection vial Inject 50 Units into the skin every morning      Calcium-Vitamins C & D (CALCIUM/C/D PO) Take by mouth      Multiple Vitamins-Minerals (MULTI-AFIA PO) Take by mouth      Glucosamine-MSM-Hyaluronic Acd (JOINT HEALTH PO) Take by mouth      aspirin 81 MG tablet Take 81 mg by mouth daily      insulin lispro (HUMALOG) 100 UNIT/ML injection vial Inject 15 Units into the skin 3 times daily (before meals) Indications: with sliding scale Sliding scal   <200 = 0 units   221-250 = 1 unit  251-300 = 2 units   301 - 350 = 3 units   351-400= 4 units   401-430 = 5 units       No current facility-administered medications for this visit. Allergies: Allergies   Allergen Reactions    Atorvastatin Swelling    Lisinopril Other (See Comments)    Naproxen Nausea Only    Oxycodone-Acetaminophen Other (See Comments)           Review of Systems:    Pertinent items are noted in HPI        Vital Signs: There were no vitals filed for this visit. General Exam:     Constitutional: Patient is adequately groomed with no evidence of malnutrition    Physical Exam: right wrist      Primary Exam:    Inspection: No deformity atrophy appreciable effusion      Palpation: Percussion tenderness at the carpal tunnel space      Range of Motion: Full range and symmetric at the wrist      Strength: Normal APB      Special Tests: Tinel's suggestive at the wrist      Skin: There are no rashes, ulcerations or lesions.       Neurovascular - non focal and intact     Additional Comments:        Additional Examinations:                   Office Imaging Results/Procedures PerformedToday:             Office Procedures:     Orders Placed This Encounter   Procedures    US GUIDED NEEDLE PLACEMENT     Standing Status:   Future     Number of Occurrences:   1     Standing Expiration Date:   1/23/2024     Order Specific Question:   Reason for exam:     Answer:   nerve hydro    US EXTREMITY RIGHT NON VASC LIMITED     Standing Status:   Future     Number of Occurrences:   1     Standing Expiration Date:   1/23/2024     Order Specific Question:   Reason for exam:     Answer:   dx     Ultrasound evaluation-right wrist  Logiq E 12 MHz     The wrist was supported and fully supinated the median nerve was evaluated at the level of the carpal tunnel in both short axis and long axis. There was evidence of decreased excursion of the median nerve within the carpal tunnel space consistent with entrapment/carpal tunnel syndrome. The nerve measured 0.5 cm² at the level of the proximal carpal tunnel. Just proximal to this the nerve was significantly increased in caliber and on long axis imaging sonographic findings characteristic of notch sign consistent with nerve entrapment/carpal tunnel syndrome. Ulnar artery was identified with normal sonographic appearance. No other soft tissue abnormalities were appreciated. Ultrasound-guided normal nerve hydrodissection-right  Logiq E ultrasound 12 MHz    Patient positioned RT lateral decubitus position with the wrist in forearm fully supinated and supported on cylinder support. The volar aspect of the wrist was prepped in sterile fashion. Linear transducer was utilized in the median nerve was evaluated in short axis over the carpal tunnel space at the level of the [facility]. The ulnar artery was identified and marked. Using longitudinal technique 25-gauge needle was advanced under direct guidance approaching from the ulnar aspect of the wrist and was advanced just ulnar to the median nerve within the carpal tunnel.  A small volume of injectate consisting of a 10-1 mixture of dextrose solution: Xylocaine was injected to start the hydrodissection. The needle was then advanced deep to the median nerve and then hydrodissected from the flexor tendon sheath. The needle was then repositioned superficial to the median nerve and was hydrodissected from the TCL. A total of   8cc of injectate was utilized for the circumferential hydrodissection. Patient tolerated this with minimal discomfort. Other Outside Imaging and Testing Personally Reviewed:    US GUIDED NEEDLE PLACEMENT    Result Date: 2023  Radiology result is complete; follow up with provider / physician office for radiology results     US EXTREMITY RIGHT NON VASC LIMITED    Result Date: 2023  Radiology result is complete; follow up with provider / physician office for radiology results      PATIENT:  Anne Chavez        DATE OF EM23      YOB: 1955        REASON FOR EMG:   Right arm numbness        REFERRING PHYSICIAN:  MD Louie Thompson [address]      SUMMARY:   The right median sensory nerve study was not recordable. The right median motor nerve study had a prolonged distal latency and low amplitude. The right ulnar motor nerve study had a mild slowing of conduction velocity across the elbow. The right ulnar and radial sensory nerve studies were not recordable. Needle EMG of several muscles in the right upper extremity showed decreased motor units in the abductor pollicis brevis muscle. CLINICAL DIAGNOSIS:  Carpal tunnel syndrome versus radiculopathy           EMG RESULTS:      1. This patient has a moderately severe right median nerve lesion at the wrist.  (Carpal tunnel syndrome). 2.  There is also mild right ulnar nerve lesion at the elbow.           ---------------------------------------------  Caralee Spurling, M.D. Electromyographer / Neurologist         Assessment   Impression: . Encounter Diagnoses   Name Primary?     Carpal tunnel syndrome, right     Neuritis of right median nerve     History of carpal tunnel release of both wrists         Status post open carpal tunnel release x2      Plan:     Post procedure protocol  Median nerve glide exercises nocturnal wrist splinting  Clinical follow-up in 3 to 4 weeks       Orders:        Orders Placed This Encounter   Procedures    US GUIDED NEEDLE PLACEMENT     Standing Status:   Future     Number of Occurrences:   1     Standing Expiration Date:   1/23/2024     Order Specific Question:   Reason for exam:     Answer:   nerve hydro    US EXTREMITY RIGHT NON VASC LIMITED     Standing Status:   Future     Number of Occurrences:   1     Standing Expiration Date:   1/23/2024     Order Specific Question:   Reason for exam:     Answer:   maryanne Moeller MD.      Disclaimer: \"This note was dictated with voice recognition software. Though review and correction are routine, we apologize for any errors. \"

## 2023-01-23 NOTE — PROGRESS NOTES
1/23/23 4:14 PM    Dextrose Injection 5%      NDC: 7891-0572-82    Lot Number: [id number]    Body Part: R wrist

## 2023-01-27 RX ORDER — LIDOCAINE HYDROCHLORIDE 10 MG/ML
2 INJECTION, SOLUTION INFILTRATION; PERINEURAL ONCE
Status: SHIPPED | OUTPATIENT
Start: 2023-01-27

## 2023-02-10 ENCOUNTER — TELEPHONE (OUTPATIENT)
Dept: INTERNAL MEDICINE CLINIC | Age: 68
End: 2023-02-10

## 2023-02-10 DIAGNOSIS — R92.8 ABNORMAL MAMMOGRAM: Primary | ICD-10-CM

## 2023-02-10 NOTE — TELEPHONE ENCOUNTER
I placed the orders as they requested. However, patient has had mammograms completed sooner than the message states. Please ensure they see these for comparison. She also has orders in already for what was recommended to be followed up on. However, I still placed orders as they requested.

## 2023-02-10 NOTE — TELEPHONE ENCOUNTER
[id number]  [phone]--pt has appt Monday morning---they need new orders for pt mamogram--because her last one done was 7/2019 they need order for Diagnostic Bilateral mammogram and a US right breast ---please put in epic. Thanks.

## 2023-02-13 ENCOUNTER — HOSPITAL ENCOUNTER (OUTPATIENT)
Dept: WOMENS IMAGING | Age: 68
Discharge: HOME | End: 2023-02-13
Payer: MEDICARE

## 2023-02-13 ENCOUNTER — HOSPITAL ENCOUNTER (OUTPATIENT)
Dept: ULTRASOUND IMAGING | Age: 68
Discharge: HOME | End: 2023-02-13
Payer: MEDICARE

## 2023-02-13 VITALS — BODY MASS INDEX: 30.24 KG/M2 | HEIGHT: 59 IN | WEIGHT: 150 LBS

## 2023-02-13 DIAGNOSIS — R92.8 ABNORMAL MAMMOGRAM: ICD-10-CM

## 2023-02-13 PROCEDURE — 77065 DX MAMMO INCL CAD UNI: CPT

## 2023-02-13 PROCEDURE — 76642 ULTRASOUND BREAST LIMITED: CPT

## 2023-02-14 ENCOUNTER — TELEPHONE (OUTPATIENT)
Dept: INTERNAL MEDICINE CLINIC | Age: 68
End: 2023-02-14

## 2023-02-21 ENCOUNTER — OFFICE VISIT (OUTPATIENT)
Dept: ORTHOPEDIC SURGERY | Age: 68
End: 2023-02-21

## 2023-02-21 VITALS — BODY MASS INDEX: 31.51 KG/M2 | WEIGHT: 156.31 LBS | HEIGHT: 59 IN

## 2023-02-21 DIAGNOSIS — G56.11 NEURITIS OF RIGHT MEDIAN NERVE: ICD-10-CM

## 2023-02-21 DIAGNOSIS — G56.01 CARPAL TUNNEL SYNDROME, RIGHT: Primary | ICD-10-CM

## 2023-02-21 RX ORDER — GABAPENTIN 100 MG/1
100 CAPSULE ORAL NIGHTLY
Qty: 30 CAPSULE | Refills: 3 | Status: SHIPPED | OUTPATIENT
Start: 2023-02-21 | End: 2023-06-21

## 2023-02-21 RX ORDER — CELECOXIB 200 MG/1
200 CAPSULE ORAL DAILY PRN
Qty: 30 CAPSULE | Refills: 3 | Status: SHIPPED | OUTPATIENT
Start: 2023-02-21

## 2023-02-21 NOTE — PROGRESS NOTES
Chief Complaint:   Chief Complaint   Patient presents with    Wrist Pain     F/U R wrist follow up from nerve hydro, feels 50% better overall. Has more numbness in hand than before. History of Present Illness:       Patient is a 79 y.o. female returns follow up for the above complaint. The patient was last seen approximately 1 monthsago. The symptoms are improving since the last visit. The patient has had no further testing for the problem. Status post ultrasound-guided nerve hydrodissection 1/23/2022    She estimates approximately 50% improvement overall    Pain levels 5/10    No new injuries no new events         Past Medical History:        Past Medical History:   Diagnosis Date    Anxiety     Depression     Diabetes mellitus (HCC)     Genital herpes     Hyperlipidemia     Hypertension     PTSD (post-traumatic stress disorder)     Vitamin D deficiency         Present Medications:         Current Outpatient Medications   Medication Sig Dispense Refill    gabapentin (NEURONTIN) 100 MG capsule Take 1 capsule by mouth nightly for 120 days.  Intended supply: 30 days 30 capsule 3    celecoxib (CELEBREX) 200 MG capsule Take 1 capsule by mouth daily as needed for Pain 30 capsule 3    acyclovir (ZOVIRAX) 200 MG capsule Take 2 capsules by mouth twice daily 120 capsule 0    fluticasone (FLONASE) 50 MCG/ACT nasal spray 1 spray by Each Nostril route daily 1 each 2    amitriptyline (ELAVIL) 100 MG tablet Take 1 tablet by mouth nightly 90 tablet 0    cyclobenzaprine (FLEXERIL) 5 MG tablet TAKE 1 TABLET BY MOUTH NIGHTLY AS NEEDED FOR MUSCLE SPASM 90 tablet 0    Cholecalciferol (VITAMIN D3) 50 MCG (2000 UT) TABS TAKE 2 TABLETS BY MOUTH ONCE DAILY WITH BREAKFAST 120 tablet 0    sertraline (ZOLOFT) 100 MG tablet TAKE 1 TABLET BY MOUTH ONCE DAILY 90 tablet 0    pravastatin (PRAVACHOL) 40 MG tablet Take 1 tablet by mouth once daily 90 tablet 1    Calcium-Magnesium-Zinc 333-133-5 MG TABS Take 1 tablet by mouth daily insulin glargine (LANTUS) 100 UNIT/ML injection vial Inject 50 Units into the skin every morning      Calcium-Vitamins C & D (CALCIUM/C/D PO) Take by mouth      Multiple Vitamins-Minerals (MULTI-AFIA PO) Take by mouth      Glucosamine-MSM-Hyaluronic Acd (JOINT HEALTH PO) Take by mouth      aspirin 81 MG tablet Take 81 mg by mouth daily      insulin lispro (HUMALOG) 100 UNIT/ML injection vial Inject 15 Units into the skin 3 times daily (before meals) Indications: with sliding scale Sliding scal   <200 = 0 units   221-250 = 1 unit  251-300 = 2 units   301 - 350 = 3 units   351-400= 4 units   401-430 = 5 units       Current Facility-Administered Medications   Medication Dose Route Frequency Provider Last Rate Last Admin    lidocaine 1 % injection 2 mL  2 mL SubCUTAneous Once Megan Meyer MD             Allergies: Allergies   Allergen Reactions    Atorvastatin Swelling    Lisinopril Other (See Comments)    Naproxen Nausea Only    Oxycodone-Acetaminophen Other (See Comments)           Review of Systems:    Pertinent items are noted in HPI        Vital Signs: There were no vitals filed for this visit. General Exam:     Constitutional: Patient is adequately groomed with no evidence of malnutrition    Physical Exam: right wrist      Primary Exam:    Inspection: No deformity atrophy appreciable effusion      Palpation: No focal tenderness      Range of Motion: Full range      Strength: Normal APB      Special Tests:       Skin: There are no rashes, ulcerations or lesions. Gait: Nonantalgic      Neurovascular -decreased light touch sensation nerve distribution right hand-thumb         Additional Comments:        Additional Examinations:                    Office Imaging Results/Procedures PerformedToday:           Office Procedures:     Orders Placed This Encounter   Procedures    Breg Erie Wrist Brace Short     Patient was prescribed a Breg Universal Short Wrist brace .   The right wrist will require stabilization / immobilization from this semi-rigid / rigid orthosis to improve their function. The orthosis will assist in protecting the affected area, provide functional support and facilitate healing. The patient was educated and fit by a healthcare professional with expert knowledge and specialization in brace application while under the direct supervision of the treating physician. Verbal and written instructions for the use of and application of this item were provided. They were instructed to contact the office immediately should the brace result in increased pain, decreased sensation, increased swelling or worsening of the condition. Other Outside Imaging and Testing Personally Reviewed:    No results found. Assessment   Impression: . Encounter Diagnoses   Name Primary? Carpal tunnel syndrome, right Yes    Neuritis of right median nerve           Status post ultrasound-guided nerve hydrodissection 1/23/2022      Plan:     Continue median nerve glide exercises  Continue gabapentin low-dose and as needed use of Celebrex  Nocturnal wrist splinting  Consider repeat ultrasound-guided median nerve hydrodissection at the wrist as needed       Orders:        Orders Placed This Encounter   Procedures    Breg Mancos Wrist Brace Short     Patient was prescribed a Breg Universal Short Wrist brace . The right wrist will require stabilization / immobilization from this semi-rigid / rigid orthosis to improve their function. The orthosis will assist in protecting the affected area, provide functional support and facilitate healing. The patient was educated and fit by a healthcare professional with expert knowledge and specialization in brace application while under the direct supervision of the treating physician. Verbal and written instructions for the use of and application of this item were provided.    They were instructed to contact the office immediately should the brace result in increased pain, decreased sensation, increased swelling or worsening of the condition. Svetlana Irene MD.      Disclaimer: \"This note was dictated with voice recognition software. Though review and correction are routine, we apologize for any errors. \"

## 2023-03-21 DIAGNOSIS — A60.00 GENITAL HERPES SIMPLEX, UNSPECIFIED SITE: ICD-10-CM

## 2023-03-21 DIAGNOSIS — M25.511 CHRONIC RIGHT SHOULDER PAIN: ICD-10-CM

## 2023-03-21 DIAGNOSIS — G89.29 CHRONIC RIGHT SHOULDER PAIN: ICD-10-CM

## 2023-03-21 RX ORDER — ACYCLOVIR 200 MG/1
CAPSULE ORAL
Qty: 120 CAPSULE | Refills: 0 | Status: SHIPPED | OUTPATIENT
Start: 2023-03-21

## 2023-03-21 RX ORDER — CHOLECALCIFEROL (VITAMIN D3) 125 MCG
CAPSULE ORAL
Qty: 120 TABLET | Refills: 0 | Status: SHIPPED | OUTPATIENT
Start: 2023-03-21

## 2023-03-21 RX ORDER — CYCLOBENZAPRINE HCL 5 MG
TABLET ORAL
Qty: 90 TABLET | Refills: 0 | Status: SHIPPED | OUTPATIENT
Start: 2023-03-21

## 2023-03-21 NOTE — TELEPHONE ENCOUNTER
----- Message from Santos Nance sent at 3/20/2023  4:18 PM EDT -----  Subject: Refill Request    QUESTIONS  Name of Medication? Cholecalciferol (VITAMIN D3) 50 MCG (2000 UT) TABS  Patient-reported dosage and instructions? TAKE 2 TABLETS BY MOUTH ONCE   DAILY WITH BREAKFAST  How many days do you have left? 0  Preferred Pharmacy? [address]  Pharmacy phone number (if available)? [phone]  ---------------------------------------------------------------------------  --------------,  Name of Medication? acyclovir (ZOVIRAX) 200 MG capsule  Patient-reported dosage and instructions? Take 2 tablets daily  How many days do you have left? 0  Preferred Pharmacy? [address]  Pharmacy phone number (if available)? [phone]  ---------------------------------------------------------------------------  --------------,  Name of Medication? cyclobenzaprine (FLEXERIL) 5 MG tablet  Patient-reported dosage and instructions? TAKE 1 TABLET BY MOUTH NIGHTLY   AS NEEDED FOR MUSCLE SPASM  How many days do you have left? 0  Preferred Pharmacy? [address]  Pharmacy phone number (if available)? [phone]  ---------------------------------------------------------------------------  --------------,  Name of Medication? fluticasone (FLONASE) 50 MCG/ACT nasal spray  Patient-reported dosage and instructions? 1 spray by Each Nostril route   daily  How many days do you have left? 0  Preferred Pharmacy? [address]  Pharmacy phone number (if available)? [phone]  ---------------------------------------------------------------------------  --------------  Kameron MARTI  What is the best way for the office to contact you? OK to leave message on   voicemail  Preferred Call Back Phone Number? [phone]  ---------------------------------------------------------------------------  --------------  SCRIPT ANSWERS  Relationship to Patient?  Self

## 2023-03-22 ENCOUNTER — OFFICE VISIT (OUTPATIENT)
Dept: ORTHOPEDIC SURGERY | Age: 68
End: 2023-03-22

## 2023-03-22 VITALS — HEIGHT: 59 IN | WEIGHT: 155 LBS | BODY MASS INDEX: 31.25 KG/M2

## 2023-03-22 DIAGNOSIS — G56.01 CARPAL TUNNEL SYNDROME, RIGHT: Primary | ICD-10-CM

## 2023-03-22 DIAGNOSIS — G56.11 NEURITIS OF RIGHT MEDIAN NERVE: ICD-10-CM

## 2023-03-22 NOTE — PROGRESS NOTES
3/22/23 4:27 PM    Dextrose Injection 5%      NDC: 0393-3785-62    Lot Number: [id number]    Body Part: R wrist

## 2023-03-24 RX ORDER — LIDOCAINE HYDROCHLORIDE 10 MG/ML
1 INJECTION, SOLUTION INFILTRATION; PERINEURAL ONCE
Status: COMPLETED | OUTPATIENT
Start: 2023-03-24 | End: 2023-03-24

## 2023-03-24 RX ADMIN — LIDOCAINE HYDROCHLORIDE 1 ML: 10 INJECTION, SOLUTION INFILTRATION; PERINEURAL at 09:49

## 2023-03-26 NOTE — PROGRESS NOTES
Chief Complaint:   Chief Complaint   Patient presents with    Wrist Pain     R wrist f/u. Wrist doing about the same. Taking celebrex during the day and gabapentin at night. Nocturnal splinting prn. Not a lot of pain, more numbness and tingling. History of Present Illness:       Patient is a 79 y.o. female returns follow up for the above complaint. The patient was last seen approximately 5 weeksago. The symptoms show no change since the last visit. The patient has had no further testing for the problem. Ultrasound-guided nerve hydrodissection 1/23/2022 with therapeutic benefit    Improvement has plateaued she would like to consider other treatment options    No new injuries no new events pain levels 3/10    She continues with low-dose gabapentin and Celebrex           Past Medical History:        Past Medical History:   Diagnosis Date    Anxiety     Depression     Diabetes mellitus (HCC)     Genital herpes     Hyperlipidemia     Hypertension     PTSD (post-traumatic stress disorder)     Vitamin D deficiency         Present Medications:         Current Outpatient Medications   Medication Sig Dispense Refill    Cholecalciferol (VITAMIN D3) 50 MCG (2000 UT) TABS TAKE 2 TABLETS BY MOUTH ONCE DAILY WITH BREAKFAST 120 tablet 0    acyclovir (ZOVIRAX) 200 MG capsule Take 2 capsules by mouth twice daily 120 capsule 0    cyclobenzaprine (FLEXERIL) 5 MG tablet TAKE 1 TABLET BY MOUTH NIGHTLY AS NEEDED FOR MUSCLE SPASM 90 tablet 0    gabapentin (NEURONTIN) 100 MG capsule Take 1 capsule by mouth nightly for 120 days.  Intended supply: 30 days 30 capsule 3    celecoxib (CELEBREX) 200 MG capsule Take 1 capsule by mouth daily as needed for Pain 30 capsule 3    fluticasone (FLONASE) 50 MCG/ACT nasal spray 1 spray by Each Nostril route daily 1 each 2    amitriptyline (ELAVIL) 100 MG tablet Take 1 tablet by mouth nightly 90 tablet 0    sertraline (ZOLOFT) 100 MG tablet TAKE 1 TABLET BY MOUTH ONCE DAILY 90 tablet 0

## 2023-04-19 ENCOUNTER — OFFICE VISIT (OUTPATIENT)
Dept: ORTHOPEDIC SURGERY | Age: 68
End: 2023-04-19

## 2023-04-19 VITALS — BODY MASS INDEX: 32.07 KG/M2 | WEIGHT: 158.9 LBS

## 2023-04-19 DIAGNOSIS — G56.11 NEURITIS OF RIGHT MEDIAN NERVE: ICD-10-CM

## 2023-04-19 DIAGNOSIS — G56.01 CARPAL TUNNEL SYNDROME, RIGHT: Primary | ICD-10-CM

## 2023-04-19 DIAGNOSIS — Z98.890 HISTORY OF CARPAL TUNNEL RELEASE OF BOTH WRISTS: ICD-10-CM

## 2023-04-22 NOTE — PROGRESS NOTES
Chief Complaint:   Chief Complaint   Patient presents with    Wrist Pain          History of Present Illness:       Patient is a 79 y.o. female returns follow up for the above complaint. The patient was last seen approximately 1 monthsago. The symptoms are overall improved but remain problematic since the last visit. The patient has had no further testing for the problem. Status post ultrasound-guided median nerve hydrodissection x2 most recently performed 3/22/2023    Unfortunately she continues to experience numbness but the neuritic symptoms of tingling in the median nerve distribution have dissipated    She continues on Celebrex and gabapentin for symptom control    She would like to consider other treatment options    Pain levels 5/10     Past Medical History:        Past Medical History:   Diagnosis Date    Anxiety     Depression     Diabetes mellitus ([facility] [address]     Genital herpes     Hyperlipidemia     Hypertension     PTSD (post-traumatic stress disorder)     Vitamin D deficiency         Present Medications:         Current Outpatient Medications   Medication Sig Dispense Refill    Cholecalciferol (VITAMIN D3) 50 MCG (2000 UT) TABS TAKE 2 TABLETS BY MOUTH ONCE DAILY WITH BREAKFAST 120 tablet 0    acyclovir (ZOVIRAX) 200 MG capsule Take 2 capsules by mouth twice daily 120 capsule 0    cyclobenzaprine (FLEXERIL) 5 MG tablet TAKE 1 TABLET BY MOUTH NIGHTLY AS NEEDED FOR MUSCLE SPASM 90 tablet 0    gabapentin (NEURONTIN) 100 MG capsule Take 1 capsule by mouth nightly for 120 days.  Intended supply: 30 days 30 capsule 3    celecoxib (CELEBREX) 200 MG capsule Take 1 capsule by mouth daily as needed for Pain 30 capsule 3    fluticasone (FLONASE) 50 MCG/ACT nasal spray 1 spray by Each Nostril route daily 1 each 2    amitriptyline (ELAVIL) 100 MG tablet Take 1 tablet by mouth nightly 90 tablet 0    sertraline (ZOLOFT) 100 MG tablet TAKE 1 TABLET BY MOUTH ONCE DAILY 90 tablet 0    pravastatin (PRAVACHOL) 40 MG tablet

## 2023-05-24 ENCOUNTER — TELEPHONE (OUTPATIENT)
Dept: ORTHOPEDIC SURGERY | Age: 68
End: 2023-05-24

## 2023-05-24 NOTE — TELEPHONE ENCOUNTER
Appointment Request     Patient requesting earlier appointment: Yes  Appointment offered to patient: YES   Patient Contact Number: [phone]    Omar Wood

## 2023-05-24 NOTE — TELEPHONE ENCOUNTER
Left a vm for the patient letting her know that the appointment that she has out at [place] with Dr. Britney Heredia is the soonest appointment that we have. We are not out in the [place] office that often.

## 2023-05-26 DIAGNOSIS — A60.00 GENITAL HERPES SIMPLEX, UNSPECIFIED SITE: ICD-10-CM

## 2023-05-26 RX ORDER — ACYCLOVIR 200 MG/1
CAPSULE ORAL
Qty: 120 CAPSULE | Refills: 0 | Status: SHIPPED | OUTPATIENT
Start: 2023-05-26

## 2023-05-26 NOTE — TELEPHONE ENCOUNTER
----- Message from Aletheaobed Larry sent at 5/26/2023  3:29 PM EDT -----  Subject: Refill Request    QUESTIONS  Name of Medication? acyclovir (ZOVIRAX) 200 MG capsule  Patient-reported dosage and instructions? 2 tabs at night ( not sure of   the dosage)  How many days do you have left? 2  Preferred Pharmacy? [address]  Pharmacy phone number (if available)? [phone]  ---------------------------------------------------------------------------  --------------  Ulises MARTI  What is the best way for the office to contact you? OK to leave message on   voicemail  Preferred Call Back Phone Number? [phone]  ---------------------------------------------------------------------------  --------------  SCRIPT ANSWERS  Relationship to Patient?  Self

## 2023-06-06 ENCOUNTER — OFFICE VISIT (OUTPATIENT)
Dept: INTERNAL MEDICINE CLINIC | Age: 68
End: 2023-06-06
Payer: MEDICARE

## 2023-06-06 VITALS
WEIGHT: 159.8 LBS | DIASTOLIC BLOOD PRESSURE: 62 MMHG | OXYGEN SATURATION: 96 % | SYSTOLIC BLOOD PRESSURE: 118 MMHG | BODY MASS INDEX: 32.25 KG/M2 | HEART RATE: 95 BPM

## 2023-06-06 DIAGNOSIS — Z12.11 SCREEN FOR COLON CANCER: ICD-10-CM

## 2023-06-06 DIAGNOSIS — F32.A ANXIETY AND DEPRESSION: ICD-10-CM

## 2023-06-06 DIAGNOSIS — M54.50 LUMBAR PAIN: ICD-10-CM

## 2023-06-06 DIAGNOSIS — E55.9 VITAMIN D DEFICIENCY: ICD-10-CM

## 2023-06-06 DIAGNOSIS — F41.9 ANXIETY AND DEPRESSION: ICD-10-CM

## 2023-06-06 DIAGNOSIS — M25.511 CHRONIC RIGHT SHOULDER PAIN: ICD-10-CM

## 2023-06-06 DIAGNOSIS — E78.2 MIXED HYPERLIPIDEMIA: ICD-10-CM

## 2023-06-06 DIAGNOSIS — A60.00 GENITAL HERPES SIMPLEX, UNSPECIFIED SITE: ICD-10-CM

## 2023-06-06 DIAGNOSIS — I10 ESSENTIAL HYPERTENSION: ICD-10-CM

## 2023-06-06 DIAGNOSIS — R19.5 POSITIVE FIT (FECAL IMMUNOCHEMICAL TEST): ICD-10-CM

## 2023-06-06 DIAGNOSIS — E10.9 TYPE 1 DIABETES MELLITUS WITHOUT COMPLICATION (HCC): Primary | ICD-10-CM

## 2023-06-06 DIAGNOSIS — G89.29 CHRONIC RIGHT SHOULDER PAIN: ICD-10-CM

## 2023-06-06 DIAGNOSIS — J30.89 SEASONAL ALLERGIC RHINITIS DUE TO OTHER ALLERGIC TRIGGER: ICD-10-CM

## 2023-06-06 DIAGNOSIS — F43.10 PTSD (POST-TRAUMATIC STRESS DISORDER): ICD-10-CM

## 2023-06-06 PROCEDURE — 3078F DIAST BP <80 MM HG: CPT | Performed by: NURSE PRACTITIONER

## 2023-06-06 PROCEDURE — 1123F ACP DISCUSS/DSCN MKR DOCD: CPT | Performed by: NURSE PRACTITIONER

## 2023-06-06 PROCEDURE — 3074F SYST BP LT 130 MM HG: CPT | Performed by: NURSE PRACTITIONER

## 2023-06-06 PROCEDURE — 99214 OFFICE O/P EST MOD 30 MIN: CPT | Performed by: NURSE PRACTITIONER

## 2023-06-06 RX ORDER — FLUTICASONE PROPIONATE 50 MCG
1 SPRAY, SUSPENSION (ML) NASAL DAILY
Qty: 1 EACH | Refills: 2 | Status: SHIPPED | OUTPATIENT
Start: 2023-06-06

## 2023-06-06 RX ORDER — CYCLOBENZAPRINE HCL 5 MG
TABLET ORAL
Qty: 90 TABLET | Refills: 1 | Status: SHIPPED | OUTPATIENT
Start: 2023-06-06

## 2023-06-06 RX ORDER — PRAVASTATIN SODIUM 40 MG
TABLET ORAL
Qty: 90 TABLET | Refills: 1 | Status: SHIPPED | OUTPATIENT
Start: 2023-06-06

## 2023-06-06 RX ORDER — ACYCLOVIR 200 MG/1
CAPSULE ORAL
Qty: 360 CAPSULE | Refills: 0 | Status: SHIPPED | OUTPATIENT
Start: 2023-06-06

## 2023-06-06 RX ORDER — SERTRALINE HYDROCHLORIDE 100 MG/1
TABLET, FILM COATED ORAL
Qty: 90 TABLET | Refills: 1 | Status: SHIPPED | OUTPATIENT
Start: 2023-06-06

## 2023-06-06 RX ORDER — AMITRIPTYLINE HYDROCHLORIDE 100 MG/1
100 TABLET, FILM COATED ORAL NIGHTLY
Qty: 90 TABLET | Refills: 1 | Status: SHIPPED | OUTPATIENT
Start: 2023-06-06

## 2023-06-06 RX ORDER — CHOLECALCIFEROL (VITAMIN D3) 125 MCG
CAPSULE ORAL
Qty: 180 TABLET | Refills: 1 | Status: SHIPPED | OUTPATIENT
Start: 2023-06-06

## 2023-06-06 SDOH — ECONOMIC STABILITY: FOOD INSECURITY: WITHIN THE PAST 12 MONTHS, THE FOOD YOU BOUGHT JUST DIDN'T LAST AND YOU DIDN'T HAVE MONEY TO GET MORE.: NEVER TRUE

## 2023-06-06 SDOH — ECONOMIC STABILITY: FOOD INSECURITY: WITHIN THE PAST 12 MONTHS, YOU WORRIED THAT YOUR FOOD WOULD RUN OUT BEFORE YOU GOT MONEY TO BUY MORE.: NEVER TRUE

## 2023-06-06 SDOH — ECONOMIC STABILITY: INCOME INSECURITY: HOW HARD IS IT FOR YOU TO PAY FOR THE VERY BASICS LIKE FOOD, HOUSING, MEDICAL CARE, AND HEATING?: NOT HARD AT ALL

## 2023-06-06 SDOH — ECONOMIC STABILITY: HOUSING INSECURITY
IN THE LAST 12 MONTHS, WAS THERE A TIME WHEN YOU DID NOT HAVE A STEADY PLACE TO SLEEP OR SLEPT IN A SHELTER (INCLUDING NOW)?: NO

## 2023-06-06 ASSESSMENT — ENCOUNTER SYMPTOMS
VOMITING: 0
WHEEZING: 0
CONSTIPATION: 0
ABDOMINAL PAIN: 0
SHORTNESS OF BREATH: 0
COUGH: 0
DIARRHEA: 0
NAUSEA: 0

## 2023-06-06 ASSESSMENT — PATIENT HEALTH QUESTIONNAIRE - PHQ9
7. TROUBLE CONCENTRATING ON THINGS, SUCH AS READING THE NEWSPAPER OR WATCHING TELEVISION: 0
10. IF YOU CHECKED OFF ANY PROBLEMS, HOW DIFFICULT HAVE THESE PROBLEMS MADE IT FOR YOU TO DO YOUR WORK, TAKE CARE OF THINGS AT HOME, OR GET ALONG WITH OTHER PEOPLE: 0
SUM OF ALL RESPONSES TO PHQ QUESTIONS 1-9: 3
SUM OF ALL RESPONSES TO PHQ QUESTIONS 1-9: 3
3. TROUBLE FALLING OR STAYING ASLEEP: 0
6. FEELING BAD ABOUT YOURSELF - OR THAT YOU ARE A FAILURE OR HAVE LET YOURSELF OR YOUR FAMILY DOWN: 1
SUM OF ALL RESPONSES TO PHQ QUESTIONS 1-9: 3
4. FEELING TIRED OR HAVING LITTLE ENERGY: 1
8. MOVING OR SPEAKING SO SLOWLY THAT OTHER PEOPLE COULD HAVE NOTICED. OR THE OPPOSITE, BEING SO FIGETY OR RESTLESS THAT YOU HAVE BEEN MOVING AROUND A LOT MORE THAN USUAL: 0
SUM OF ALL RESPONSES TO PHQ QUESTIONS 1-9: 3
SUM OF ALL RESPONSES TO PHQ9 QUESTIONS 1 & 2: 0
5. POOR APPETITE OR OVEREATING: 1
1. LITTLE INTEREST OR PLEASURE IN DOING THINGS: 0
9. THOUGHTS THAT YOU WOULD BE BETTER OFF DEAD, OR OF HURTING YOURSELF: 0
2. FEELING DOWN, DEPRESSED OR HOPELESS: 0

## 2023-06-06 NOTE — PATIENT INSTRUCTIONS
Please get your fasting lab work (no food or drink for 10-12 hours prior besides water) completed M-F [id number] at our office. [facility] lab has walk-in hours available as well - no appointment is needed. We will call or mychart message you with your results.

## 2023-06-06 NOTE — PROGRESS NOTES
Chest:      Chest wall: No tenderness. Abdominal:      General: Bowel sounds are normal.      Palpations: Abdomen is soft. Skin:     General: Skin is warm and dry. Neurological:      Mental Status: She is alert and oriented to person, place, and time. Coordination: Coordination normal.   Psychiatric:         Mood and Affect: Mood normal.     Assessment and Plan:  Madhavi Chance was seen today for hypertension, 6 month follow-up and depression. Diagnoses and all orders for this visit:    Type 1 diabetes mellitus without complication ([facility] [address]   - Continue with endocrine. Essential hypertension   - Blood pressure stable. Asymptomatic.     - Continue with lifestyle modifications    Mixed hyperlipidemia  -     Taking medications as prescribed without side effects.  - Did not have repeat labs completed as recommended. Compliance encouraged. - pravastatin (PRAVACHOL) 40 MG tablet; Take 1 tablet by mouth once daily    Genital herpes simplex, unspecified site  -    No recent flare. Asymptomatic.  - Patient would like a prescription written as previously completed. -  acyclovir (ZOVIRAX) 200 MG capsule; Take 2 capsules by mouth twice daily  - Pt will call if symptoms worsen or fail to improve    Vitamin D deficiency  -     Labs ordered and not completed. - Compliance encouraged. Will reevaluate  - Cholecalciferol (VITAMIN D3) 50 MCG (2000 UT) TABS; TAKE 2 TABLETS BY MOUTH ONCE DAILY WITH BREAKFAST    Seasonal allergic rhinitis due to other allergic trigger  -     taking medications as prescribed without side effects  - fluticasone (FLONASE) 50 MCG/ACT nasal spray; 1 spray by Each Nostril route daily    Positive FIT (fecal immunochemical test)/Screen for colon cancer   - Patient states she did not complete the colonoscopy. She did not schedule the colonoscopy. - Risks versus benefits were reviewed.   Strongly encouraged compliance    Lumbar pain   - Continue with Ortho    Chronic right shoulder pain  -    Patient

## 2023-06-20 ENCOUNTER — TELEPHONE (OUTPATIENT)
Dept: ORTHOPEDIC SURGERY | Age: 68
End: 2023-06-20

## 2023-06-20 NOTE — TELEPHONE ENCOUNTER
CPT: [id number]  AUTH: [employer]  INSURANCE: TERRA  LOCATION AND  AREA OF SX RT HAND  NOTE: Macie5 Gerardo Gong.

## 2023-06-28 ENCOUNTER — OFFICE VISIT (OUTPATIENT)
Dept: INTERNAL MEDICINE CLINIC | Age: 68
End: 2023-06-28
Payer: MEDICARE

## 2023-06-28 VITALS
BODY MASS INDEX: 31.65 KG/M2 | WEIGHT: 157 LBS | TEMPERATURE: 97.7 F | DIASTOLIC BLOOD PRESSURE: 72 MMHG | OXYGEN SATURATION: 95 % | SYSTOLIC BLOOD PRESSURE: 128 MMHG | HEIGHT: 59 IN | HEART RATE: 88 BPM

## 2023-06-28 DIAGNOSIS — Z12.11 SCREEN FOR COLON CANCER: ICD-10-CM

## 2023-06-28 DIAGNOSIS — M25.531 RIGHT WRIST PAIN: ICD-10-CM

## 2023-06-28 DIAGNOSIS — F41.9 ANXIETY AND DEPRESSION: ICD-10-CM

## 2023-06-28 DIAGNOSIS — Z01.818 PREOP EXAMINATION: Primary | ICD-10-CM

## 2023-06-28 DIAGNOSIS — I10 ESSENTIAL HYPERTENSION: ICD-10-CM

## 2023-06-28 DIAGNOSIS — E55.9 VITAMIN D DEFICIENCY: ICD-10-CM

## 2023-06-28 DIAGNOSIS — F32.A ANXIETY AND DEPRESSION: ICD-10-CM

## 2023-06-28 DIAGNOSIS — M25.521 RIGHT ELBOW PAIN: ICD-10-CM

## 2023-06-28 DIAGNOSIS — R73.09 ELEVATED GLUCOSE: Primary | ICD-10-CM

## 2023-06-28 DIAGNOSIS — E78.2 MIXED HYPERLIPIDEMIA: ICD-10-CM

## 2023-06-28 LAB
25(OH)D3 SERPL-MCNC: 43.3 NG/ML
ALBUMIN SERPL-MCNC: 4.4 G/DL (ref 3.4–5)
ALBUMIN/GLOB SERPL: 2 {RATIO} (ref 1.1–2.2)
ALP SERPL-CCNC: 95 U/L (ref 40–129)
ALT SERPL-CCNC: 8 U/L (ref 10–40)
ANION GAP SERPL CALCULATED.3IONS-SCNC: 8 MMOL/L (ref 3–16)
AST SERPL-CCNC: 13 U/L (ref 15–37)
BASOPHILS # BLD: 0.1 K/UL (ref 0–0.2)
BASOPHILS NFR BLD: 1 %
BILIRUB SERPL-MCNC: 0.4 MG/DL (ref 0–1)
BUN SERPL-MCNC: 14 MG/DL (ref 7–20)
CALCIUM SERPL-MCNC: 9.3 MG/DL (ref 8.3–10.6)
CHLORIDE SERPL-SCNC: 101 MMOL/L (ref 99–110)
CHOLEST SERPL-MCNC: 165 MG/DL (ref 0–199)
CO2 SERPL-SCNC: 28 MMOL/L (ref 21–32)
CREAT SERPL-MCNC: 0.7 MG/DL (ref 0.6–1.2)
DEPRECATED RDW RBC AUTO: 13.6 % (ref 12.4–15.4)
EOSINOPHIL # BLD: 0.1 K/UL (ref 0–0.6)
EOSINOPHIL NFR BLD: 2.7 %
GFR SERPLBLD CREATININE-BSD FMLA CKD-EPI: >60 ML/MIN/{1.73_M2}
GLUCOSE SERPL-MCNC: 261 MG/DL (ref 70–99)
HCT VFR BLD AUTO: 36.1 % (ref 36–48)
HDLC SERPL-MCNC: 49 MG/DL (ref 40–60)
HGB BLD-MCNC: 12.7 G/DL (ref 12–16)
LDL CHOLESTEROL CALCULATED: 97 MG/DL
LYMPHOCYTES # BLD: 1.4 K/UL (ref 1–5.1)
LYMPHOCYTES NFR BLD: 25.8 %
MCH RBC QN AUTO: 32.1 PG (ref 26–34)
MCHC RBC AUTO-ENTMCNC: 35.1 G/DL (ref 31–36)
MCV RBC AUTO: 91.4 FL (ref 80–100)
MONOCYTES # BLD: 0.4 K/UL (ref 0–1.3)
MONOCYTES NFR BLD: 7.2 %
NEUTROPHILS # BLD: 3.4 K/UL (ref 1.7–7.7)
NEUTROPHILS NFR BLD: 63.3 %
PLATELET # BLD AUTO: 212 K/UL (ref 135–450)
PMV BLD AUTO: 8.6 FL (ref 5–10.5)
POTASSIUM SERPL-SCNC: 4.2 MMOL/L (ref 3.5–5.1)
PROT SERPL-MCNC: 6.6 G/DL (ref 6.4–8.2)
RBC # BLD AUTO: 3.95 M/UL (ref 4–5.2)
SODIUM SERPL-SCNC: 137 MMOL/L (ref 136–145)
TRIGL SERPL-MCNC: 96 MG/DL (ref 0–150)
TSH SERPL DL<=0.005 MIU/L-ACNC: 2.69 UIU/ML (ref 0.27–4.2)
VLDLC SERPL CALC-MCNC: 19 MG/DL
WBC # BLD AUTO: 5.4 K/UL (ref 4–11)

## 2023-06-28 PROCEDURE — 99214 OFFICE O/P EST MOD 30 MIN: CPT | Performed by: NURSE PRACTITIONER

## 2023-06-28 PROCEDURE — 1123F ACP DISCUSS/DSCN MKR DOCD: CPT | Performed by: NURSE PRACTITIONER

## 2023-06-28 PROCEDURE — 93000 ELECTROCARDIOGRAM COMPLETE: CPT | Performed by: NURSE PRACTITIONER

## 2023-06-28 PROCEDURE — 3078F DIAST BP <80 MM HG: CPT | Performed by: NURSE PRACTITIONER

## 2023-06-28 PROCEDURE — 3074F SYST BP LT 130 MM HG: CPT | Performed by: NURSE PRACTITIONER

## 2023-06-28 ASSESSMENT — ENCOUNTER SYMPTOMS
CONSTIPATION: 0
WHEEZING: 0
SORE THROAT: 0
ABDOMINAL PAIN: 0
NAUSEA: 0
SINUS PAIN: 0
SHORTNESS OF BREATH: 0
COUGH: 0
DIARRHEA: 0
VOMITING: 0

## 2023-06-29 DIAGNOSIS — R73.09 ELEVATED GLUCOSE: ICD-10-CM

## 2023-06-29 LAB
EST. AVERAGE GLUCOSE BLD GHB EST-MCNC: 142.7 MG/DL
HBA1C MFR BLD: 6.6 %

## 2023-07-12 RX ORDER — GABAPENTIN 100 MG/1
100 CAPSULE ORAL
COMMUNITY
End: 2023-07-18 | Stop reason: SDUPTHER

## 2023-07-17 DIAGNOSIS — G56.01 CARPAL TUNNEL SYNDROME, RIGHT: Primary | ICD-10-CM

## 2023-07-17 NOTE — TELEPHONE ENCOUNTER
Prescription Refill     Medication Name:  [address] [facility].: [address]  Patient Contact Number:  [phone]      THE PT IS ASKING FOR REFILLS FOR HER GABOPENTIN AND CELEBREX.   SHE GOES TO [facility]

## 2023-07-18 ENCOUNTER — ANESTHESIA (OUTPATIENT)
Dept: OPERATING ROOM | Age: 68
End: 2023-07-18
Payer: MEDICARE

## 2023-07-18 ENCOUNTER — ANESTHESIA EVENT (OUTPATIENT)
Dept: OPERATING ROOM | Age: 68
End: 2023-07-18
Payer: MEDICARE

## 2023-07-18 ENCOUNTER — HOSPITAL ENCOUNTER (OUTPATIENT)
Age: 68
Setting detail: OUTPATIENT SURGERY
Discharge: HOME | End: 2023-07-18
Attending: ORTHOPAEDIC SURGERY | Admitting: ORTHOPAEDIC SURGERY
Payer: MEDICARE

## 2023-07-18 VITALS
TEMPERATURE: 97.1 F | OXYGEN SATURATION: 96 % | WEIGHT: 152 LBS | RESPIRATION RATE: 16 BRPM | HEIGHT: 59 IN | SYSTOLIC BLOOD PRESSURE: 145 MMHG | BODY MASS INDEX: 30.64 KG/M2 | HEART RATE: 84 BPM | DIASTOLIC BLOOD PRESSURE: 69 MMHG

## 2023-07-18 PROBLEM — G56.21 ULNAR NERVE ENTRAPMENT AT ELBOW, RIGHT: Status: ACTIVE | Noted: 2023-07-18

## 2023-07-18 LAB
GLUCOSE BLD-MCNC: 197 MG/DL (ref 70–99)
GLUCOSE BLD-MCNC: 259 MG/DL (ref 70–99)
PERFORMED ON: ABNORMAL
PERFORMED ON: ABNORMAL

## 2023-07-18 PROCEDURE — 2500000003 HC RX 250 WO HCPCS: Performed by: NURSE ANESTHETIST, CERTIFIED REGISTERED

## 2023-07-18 PROCEDURE — 3700000000 HC ANESTHESIA ATTENDED CARE: Performed by: ORTHOPAEDIC SURGERY

## 2023-07-18 PROCEDURE — 6360000002 HC RX W HCPCS: Performed by: NURSE ANESTHETIST, CERTIFIED REGISTERED

## 2023-07-18 PROCEDURE — 7100000011 HC PHASE II RECOVERY - ADDTL 15 MIN: Performed by: ORTHOPAEDIC SURGERY

## 2023-07-18 PROCEDURE — 7100000010 HC PHASE II RECOVERY - FIRST 15 MIN: Performed by: ORTHOPAEDIC SURGERY

## 2023-07-18 PROCEDURE — 3700000001 HC ADD 15 MINUTES (ANESTHESIA): Performed by: ORTHOPAEDIC SURGERY

## 2023-07-18 PROCEDURE — 2709999900 HC NON-CHARGEABLE SUPPLY: Performed by: ORTHOPAEDIC SURGERY

## 2023-07-18 PROCEDURE — 6360000002 HC RX W HCPCS: Performed by: ORTHOPAEDIC SURGERY

## 2023-07-18 PROCEDURE — 7100000001 HC PACU RECOVERY - ADDTL 15 MIN: Performed by: ORTHOPAEDIC SURGERY

## 2023-07-18 PROCEDURE — 2580000003 HC RX 258: Performed by: ANESTHESIOLOGY

## 2023-07-18 PROCEDURE — 3600000014 HC SURGERY LEVEL 4 ADDTL 15MIN: Performed by: ORTHOPAEDIC SURGERY

## 2023-07-18 PROCEDURE — 2580000003 HC RX 258: Performed by: ORTHOPAEDIC SURGERY

## 2023-07-18 PROCEDURE — 7100000000 HC PACU RECOVERY - FIRST 15 MIN: Performed by: ORTHOPAEDIC SURGERY

## 2023-07-18 PROCEDURE — A4217 STERILE WATER/SALINE, 500 ML: HCPCS | Performed by: ORTHOPAEDIC SURGERY

## 2023-07-18 PROCEDURE — 3600000004 HC SURGERY LEVEL 4 BASE: Performed by: ORTHOPAEDIC SURGERY

## 2023-07-18 PROCEDURE — 2580000003 HC RX 258: Performed by: NURSE ANESTHETIST, CERTIFIED REGISTERED

## 2023-07-18 RX ORDER — OXYCODONE HYDROCHLORIDE 5 MG/1
5 TABLET ORAL PRN
Status: DISCONTINUED | OUTPATIENT
Start: 2023-07-18 | End: 2023-07-18 | Stop reason: HOSPADM

## 2023-07-18 RX ORDER — DIPHENHYDRAMINE HYDROCHLORIDE 50 MG/ML
12.5 INJECTION INTRAMUSCULAR; INTRAVENOUS
Status: DISCONTINUED | OUTPATIENT
Start: 2023-07-18 | End: 2023-07-18 | Stop reason: HOSPADM

## 2023-07-18 RX ORDER — PROPOFOL 10 MG/ML
INJECTION, EMULSION INTRAVENOUS PRN
Status: DISCONTINUED | OUTPATIENT
Start: 2023-07-18 | End: 2023-07-18 | Stop reason: SDUPTHER

## 2023-07-18 RX ORDER — SODIUM CHLORIDE, SODIUM LACTATE, POTASSIUM CHLORIDE, CALCIUM CHLORIDE 600; 310; 30; 20 MG/100ML; MG/100ML; MG/100ML; MG/100ML
INJECTION, SOLUTION INTRAVENOUS CONTINUOUS PRN
Status: DISCONTINUED | OUTPATIENT
Start: 2023-07-18 | End: 2023-07-18 | Stop reason: SDUPTHER

## 2023-07-18 RX ORDER — ONDANSETRON 2 MG/ML
4 INJECTION INTRAMUSCULAR; INTRAVENOUS
Status: DISCONTINUED | OUTPATIENT
Start: 2023-07-18 | End: 2023-07-18 | Stop reason: HOSPADM

## 2023-07-18 RX ORDER — SODIUM CHLORIDE, SODIUM LACTATE, POTASSIUM CHLORIDE, CALCIUM CHLORIDE 600; 310; 30; 20 MG/100ML; MG/100ML; MG/100ML; MG/100ML
INJECTION, SOLUTION INTRAVENOUS CONTINUOUS
Status: DISCONTINUED | OUTPATIENT
Start: 2023-07-18 | End: 2023-07-18 | Stop reason: HOSPADM

## 2023-07-18 RX ORDER — GLYCOPYRROLATE 0.2 MG/ML
INJECTION INTRAMUSCULAR; INTRAVENOUS PRN
Status: DISCONTINUED | OUTPATIENT
Start: 2023-07-18 | End: 2023-07-18 | Stop reason: SDUPTHER

## 2023-07-18 RX ORDER — BUPIVACAINE HYDROCHLORIDE 5 MG/ML
INJECTION, SOLUTION EPIDURAL; INTRACAUDAL PRN
Status: DISCONTINUED | OUTPATIENT
Start: 2023-07-18 | End: 2023-07-18 | Stop reason: ALTCHOICE

## 2023-07-18 RX ORDER — SODIUM CHLORIDE 0.9 % (FLUSH) 0.9 %
5-40 SYRINGE (ML) INJECTION EVERY 12 HOURS SCHEDULED
Status: DISCONTINUED | OUTPATIENT
Start: 2023-07-18 | End: 2023-07-18 | Stop reason: HOSPADM

## 2023-07-18 RX ORDER — SODIUM CHLORIDE 0.9 % (FLUSH) 0.9 %
5-40 SYRINGE (ML) INJECTION PRN
Status: DISCONTINUED | OUTPATIENT
Start: 2023-07-18 | End: 2023-07-18 | Stop reason: HOSPADM

## 2023-07-18 RX ORDER — IPRATROPIUM BROMIDE AND ALBUTEROL SULFATE 2.5; .5 MG/3ML; MG/3ML
1 SOLUTION RESPIRATORY (INHALATION) ONCE
Status: DISCONTINUED | OUTPATIENT
Start: 2023-07-18 | End: 2023-07-18 | Stop reason: HOSPADM

## 2023-07-18 RX ORDER — METOCLOPRAMIDE HYDROCHLORIDE 5 MG/ML
10 INJECTION INTRAMUSCULAR; INTRAVENOUS
Status: DISCONTINUED | OUTPATIENT
Start: 2023-07-18 | End: 2023-07-18 | Stop reason: HOSPADM

## 2023-07-18 RX ORDER — SODIUM CHLORIDE 9 MG/ML
INJECTION, SOLUTION INTRAVENOUS PRN
Status: DISCONTINUED | OUTPATIENT
Start: 2023-07-18 | End: 2023-07-18 | Stop reason: HOSPADM

## 2023-07-18 RX ORDER — HYDRALAZINE HYDROCHLORIDE 20 MG/ML
10 INJECTION INTRAMUSCULAR; INTRAVENOUS
Status: DISCONTINUED | OUTPATIENT
Start: 2023-07-18 | End: 2023-07-18 | Stop reason: HOSPADM

## 2023-07-18 RX ORDER — LIDOCAINE HYDROCHLORIDE 10 MG/ML
1 INJECTION, SOLUTION EPIDURAL; INFILTRATION; INTRACAUDAL; PERINEURAL
Status: DISCONTINUED | OUTPATIENT
Start: 2023-07-18 | End: 2023-07-18 | Stop reason: HOSPADM

## 2023-07-18 RX ORDER — MEPERIDINE HYDROCHLORIDE 50 MG/ML
12.5 INJECTION INTRAMUSCULAR; INTRAVENOUS; SUBCUTANEOUS EVERY 5 MIN PRN
Status: DISCONTINUED | OUTPATIENT
Start: 2023-07-18 | End: 2023-07-18 | Stop reason: HOSPADM

## 2023-07-18 RX ORDER — MAGNESIUM HYDROXIDE 1200 MG/15ML
LIQUID ORAL CONTINUOUS PRN
Status: COMPLETED | OUTPATIENT
Start: 2023-07-18 | End: 2023-07-18

## 2023-07-18 RX ORDER — CELECOXIB 200 MG/1
200 CAPSULE ORAL DAILY PRN
Qty: 30 CAPSULE | Refills: 1 | Status: SHIPPED | OUTPATIENT
Start: 2023-07-18

## 2023-07-18 RX ORDER — FENTANYL CITRATE 50 UG/ML
INJECTION, SOLUTION INTRAMUSCULAR; INTRAVENOUS PRN
Status: DISCONTINUED | OUTPATIENT
Start: 2023-07-18 | End: 2023-07-18 | Stop reason: SDUPTHER

## 2023-07-18 RX ORDER — GABAPENTIN 100 MG/1
100 CAPSULE ORAL
Qty: 30 CAPSULE | Refills: 1 | Status: SHIPPED | OUTPATIENT
Start: 2023-07-18 | End: 2023-09-16

## 2023-07-18 RX ORDER — OXYCODONE HYDROCHLORIDE 5 MG/1
10 TABLET ORAL PRN
Status: DISCONTINUED | OUTPATIENT
Start: 2023-07-18 | End: 2023-07-18 | Stop reason: HOSPADM

## 2023-07-18 RX ADMIN — PROPOFOL 150 MG: 10 INJECTION, EMULSION INTRAVENOUS at 10:01

## 2023-07-18 RX ADMIN — GLYCOPYRROLATE 0.2 MG: 0.2 INJECTION, SOLUTION INTRAMUSCULAR; INTRAVENOUS at 10:01

## 2023-07-18 RX ADMIN — SODIUM CHLORIDE, SODIUM LACTATE, POTASSIUM CHLORIDE, AND CALCIUM CHLORIDE: .6; .31; .03; .02 INJECTION, SOLUTION INTRAVENOUS at 10:01

## 2023-07-18 RX ADMIN — FENTANYL CITRATE 100 MCG: 50 INJECTION, SOLUTION INTRAMUSCULAR; INTRAVENOUS at 10:01

## 2023-07-18 RX ADMIN — SODIUM CHLORIDE, POTASSIUM CHLORIDE, SODIUM LACTATE AND CALCIUM CHLORIDE: 600; 310; 30; 20 INJECTION, SOLUTION INTRAVENOUS at 09:00

## 2023-07-18 ASSESSMENT — LIFESTYLE VARIABLES: SMOKING_STATUS: 0

## 2023-07-18 ASSESSMENT — PAIN SCALES - GENERAL
PAINLEVEL_OUTOF10: 0

## 2023-07-18 ASSESSMENT — PAIN - FUNCTIONAL ASSESSMENT: PAIN_FUNCTIONAL_ASSESSMENT: 0-10

## 2023-07-18 NOTE — OP NOTE
sutures. The wounds were dressed with Adaptic & dry sterile dressings after local anesthetic was instilled for postoperative analgesia. Ms. Jessie Keith was awakened from anesthesia having tolerated the procedure without apparent complication. She was returned to the recovery room in stable condition. At the conclusion of the procedure, all needle, instrument and sponge counts were correct. Lorelei Edmondson MD   7/18/2023, 10:00 AM

## 2023-07-18 NOTE — H&P
Pre-operative Update of H&P:    I  have seen & examined Ms. Flowers Lung related solely to her hand and upper extremity conditions, prior to the scheduled procedure on the date of her surgery. The indications for the planned surgical procedure & and her upper-extremity condition are unchanged.

## 2023-07-18 NOTE — PROGRESS NOTES
Report received from [facility]. Patient in phase 2, dtg at bedside visiting.

## 2023-07-18 NOTE — ANESTHESIA POSTPROCEDURE EVALUATION
Department of Anesthesiology  Postprocedure Note    Patient: Josefina Santos  MRN: [id number]  YOB: 1955  Date of evaluation: 7/18/2023      Procedure Summary     Date: 07/18/23 Room / Location: [address] [place] [address]    Anesthesia Start: 1001 Anesthesia Stop: 1033    Procedures:       RIGHT ULNAR NERVE DECOMPRESSION AT ELBOW (Right: Arm Lower)      RIGHT CARPAL TUNNEL RELEASE (Right: Wrist) Diagnosis:       Ulnar nerve entrapment at elbow, right      Right carpal tunnel syndrome      (Ulnar nerve entrapment at elbow, right [G56.21] Right carpal tunnel syndrome [G56.01])    Surgeons: Avani Reynoso MD Responsible Provider:     Anesthesia Type: general ASA Status: 2          Anesthesia Type: No value filed.     Idalmis Phase I: Idalmis Score: 10    Idalmis Phase II: Idalmis Score: 10      Anesthesia Post Evaluation    Patient location during evaluation: PACU  Patient participation: complete - patient participated  Level of consciousness: awake and alert  Pain score: 0  Airway patency: patent  Nausea & Vomiting: no nausea and no vomiting  Complications: no  Cardiovascular status: blood pressure returned to baseline and hemodynamically stable  Respiratory status: acceptable and room air  Hydration status: euvolemic

## 2023-07-18 NOTE — TELEPHONE ENCOUNTER
[facility] to confirm how many of each medication she has left and what her pain is on a a scale of 1-10 (10 being worst). She may leave this info with CC. I will forward request to Dr. Roman Galindo.  4/19/23  [facility] for right ulnar nerve decompression and right carpal tunnel release with Dr. Edy Atwood today. Next appt not scheduled. Patient requesting both celebrex and gabapentin.

## 2023-07-18 NOTE — PROGRESS NOTES
Received in PACU. Report receive from [address] [place] [address]  and CRNA. Patient very drowsy but arouses to verbal stimuli. Offers no complaints. Left arm elevated.

## 2023-07-18 NOTE — DISCHARGE INSTRUCTIONS
Post-Operative Instructions    Ulnar Nerve Release:    Keep hand elevated with fingers above eye-level to control swelling. Keep hand and bandage clean and dry. Do not change or unwrap bandage. Please leave bandage in place until your follow-up appointment. Maintain finger motion by fully straightening and fully bending fingers and thumb at least once an hour (while awake). This may cause some discomfort, but will not damage surgery. You may use your operated hand for lightweight tasks (e.g. writing, eating, dressing, etc.). NO LIFTING, CARRYING OR HEAVY USE. Most Patients do not have \"Serious Pain\" after this procedure and thus most patients do not require prescription pain medication. You may take over the counter medication (Tylenol, Advil, Aleve, etc.) as needed. If you are unable to tolerate the discomfort after your surgery and the OTC medications do not provide some relief, you may contact our office to discuss other options. .    Please call the office at [phone]  in 24 - 48 hours to schedule a follow up appointment for approximately 7 - 10 days after surgery. Please call the office at [phone]  if you have any questions or problems. Rolando Rossi MD        ANESTHESIA DISCHARGE INSTRUCTIONS    You are under the influence of drugs- do not drink alcohol, drive a car, operate machinery(such as power tools, kitchen appliances, etc), sign legal documents, or make any important decisions for 24 hours (or while on pain medications). Children should not ride bikes or [place] or play on gym sets  for 24 hours after surgery. A responsible adult should be with you for 24 hours. Rest at home today- increase activity as tolerated. Progress slowly to a regular diet unless your physician has instructed you otherwise. Drink plenty of water.     CALL YOUR DOCTOR IF YOU:  Have moderate to severe nausea or vomiting AND are unable to hold down fluids or prescribed

## 2023-07-20 ENCOUNTER — TELEPHONE (OUTPATIENT)
Dept: ORTHOPEDIC SURGERY | Age: 68
End: 2023-07-20

## 2023-07-20 NOTE — TELEPHONE ENCOUNTER
Called patient and she is having some swelling in her hand. Patient will elevate hand, work on ROM and was advised swelling will increase and decrease over time. Patient to call back if swelling doesn't improve or with concerns.     Angelika Li, APRN - CNP

## 2023-07-26 ENCOUNTER — OFFICE VISIT (OUTPATIENT)
Dept: ORTHOPEDIC SURGERY | Age: 68
End: 2023-07-26

## 2023-07-26 VITALS — HEIGHT: 59 IN | BODY MASS INDEX: 30.64 KG/M2 | RESPIRATION RATE: 16 BRPM | WEIGHT: 152 LBS

## 2023-07-26 DIAGNOSIS — G56.01 CARPAL TUNNEL SYNDROME, RIGHT: Primary | ICD-10-CM

## 2023-07-26 PROCEDURE — 99024 POSTOP FOLLOW-UP VISIT: CPT | Performed by: ORTHOPAEDIC SURGERY

## 2023-08-08 ENCOUNTER — TELEPHONE (OUTPATIENT)
Dept: ORTHOPEDIC SURGERY | Age: 68
End: 2023-08-08

## 2023-08-08 NOTE — TELEPHONE ENCOUNTER
CALL TRANSFERRED    PATIENT IS CALLING IN REGARDING NUMBNESS IN FINGERS AND IN HAND. DOES HAVE YELLOWISH COLORING AT HAND AND ELBOW. NO WARMTH TO AREA. THERE IS REDNESS ATTHE AREA. SHE IS CONCERNED FOR INFECTION. NO FEVER AT THIS TIME.      SHE IS S/P Right  Carpal Tunnel Release & Right Ulnar Nerve decompression at the Cubital Tunnel  7/18      [facility]

## 2023-08-08 NOTE — TELEPHONE ENCOUNTER
General Question     Subject: POST OP CONCERN  Patient and /or Facility Request: [facility]  Contact Number: [phone]   PT CLLED SAID SHE IS CONCERNED HER HAND IS INFECTED SHE SAID IT IS RED AND NUMB SURGERY WAS 7/18/23  TRANSFERRED TO TRIAGE

## 2023-08-08 NOTE — TELEPHONE ENCOUNTER
Spoke with patient, she refused to go to the ER for possible infection.   Appointment made for tomorrow

## 2023-08-09 ENCOUNTER — OFFICE VISIT (OUTPATIENT)
Dept: ORTHOPEDIC SURGERY | Age: 68
End: 2023-08-09

## 2023-08-09 VITALS — HEIGHT: 59 IN | WEIGHT: 152 LBS | RESPIRATION RATE: 16 BRPM | BODY MASS INDEX: 30.64 KG/M2

## 2023-08-09 DIAGNOSIS — G56.01 CARPAL TUNNEL SYNDROME, RIGHT: Primary | ICD-10-CM

## 2023-08-09 PROCEDURE — 99024 POSTOP FOLLOW-UP VISIT: CPT | Performed by: ORTHOPAEDIC SURGERY

## 2023-08-09 NOTE — PATIENT INSTRUCTIONS
Protocol for Managing Open Wounds  Dr. Luciana Mullins            Please remove all dressings so that you may see the skin fully. Prepare a clean sink full of warm water (bath temperature). Add one to two squirts of liquid antibacterial soap. Insert hand and soak for approximately five minutes, making sure to exercise your motion of fingers and wrists fully while soaking in warm water. After finished soaking, pat wound dry. I typically recommend soaking two to three times per day with a clean dressing change after each soak.

## 2023-08-29 ENCOUNTER — OFFICE VISIT (OUTPATIENT)
Dept: ORTHOPEDIC SURGERY | Age: 68
End: 2023-08-29

## 2023-08-29 VITALS — WEIGHT: 152 LBS | BODY MASS INDEX: 30.64 KG/M2 | HEIGHT: 59 IN

## 2023-08-29 DIAGNOSIS — Z98.890 HISTORY OF CARPAL TUNNEL RELEASE: ICD-10-CM

## 2023-08-29 DIAGNOSIS — Z86.39 HISTORY OF DIABETES MELLITUS: Primary | ICD-10-CM

## 2023-08-29 DIAGNOSIS — G56.11 NEURITIS OF RIGHT MEDIAN NERVE: ICD-10-CM

## 2023-08-29 NOTE — PROGRESS NOTES
tablet 1    fluticasone (FLONASE) 50 MCG/ACT nasal spray 1 spray by Each Nostril route daily 1 each 2    cyclobenzaprine (FLEXERIL) 5 MG tablet TAKE 1 TABLET BY MOUTH NIGHTLY AS NEEDED FOR MUSCLE SPASM 90 tablet 1    Cholecalciferol (VITAMIN D3) 50 MCG (2000 UT) TABS TAKE 2 TABLETS BY MOUTH ONCE DAILY WITH BREAKFAST 180 tablet 1    acyclovir (ZOVIRAX) 200 MG capsule Take 2 capsules by mouth twice daily (Patient taking differently: Take 2 capsules by mouth daily Take 2 capsules by mouth twice daily) 360 capsule 0    Calcium-Magnesium-Zinc 333-133-5 MG TABS Take 1 tablet by mouth as needed      insulin glargine (LANTUS) 100 UNIT/ML injection vial Inject 50 Units into the skin every morning      insulin lispro (HUMALOG) 100 UNIT/ML injection vial Inject 15 Units into the skin 3 times daily (before meals) Indications: with sliding scale Sliding scal   <200 = 0 units   221-250 = 1 unit  251-300 = 2 units   301 - 350 = 3 units   351-400= 4 units   401-430 = 5 units       Current Facility-Administered Medications   Medication Dose Route Frequency Provider Last Rate Last Admin    lidocaine 1 % injection 2 mL  2 mL SubCUTAneous Once Lissett Elizalde MD             Allergies: Allergies   Allergen Reactions    Atorvastatin Swelling    Lisinopril Other (See Comments)    Morphine Other (See Comments)     Patient reports it puts her in a coma    Naproxen Nausea Only    Percocet [Oxycodone-Acetaminophen] Other (See Comments)           Review of Systems:    Pertinent items are noted in HPI   10 point review of systems negative except as mentioned in HPI    Vital Signs: There were no vitals filed for this visit. General Exam:     Constitutional: Patient is adequately groomed with no evidence of malnutrition  Mental Status: The patient is oriented to time, place and person. The patient's mood and affect are appropriate. Vascular: Examination reveals no swelling or calf tenderness.   Peripheral pulses are

## 2023-09-27 ENCOUNTER — OFFICE VISIT (OUTPATIENT)
Dept: PSYCHOLOGY | Age: 68
End: 2023-09-27
Payer: MEDICARE

## 2023-09-27 DIAGNOSIS — F43.21 ADJUSTMENT DISORDER WITH DEPRESSED MOOD: Primary | ICD-10-CM

## 2023-09-27 PROCEDURE — 1123F ACP DISCUSS/DSCN MKR DOCD: CPT | Performed by: PSYCHOLOGIST

## 2023-09-27 PROCEDURE — 90791 PSYCH DIAGNOSTIC EVALUATION: CPT | Performed by: PSYCHOLOGIST

## 2023-11-06 ENCOUNTER — OFFICE VISIT (OUTPATIENT)
Dept: PSYCHOLOGY | Age: 68
End: 2023-11-06
Payer: MEDICARE

## 2023-11-06 DIAGNOSIS — F43.21 ADJUSTMENT DISORDER WITH DEPRESSED MOOD: Primary | ICD-10-CM

## 2023-11-06 PROCEDURE — 1123F ACP DISCUSS/DSCN MKR DOCD: CPT | Performed by: PSYCHOLOGIST

## 2023-11-06 PROCEDURE — 90832 PSYTX W PT 30 MINUTES: CPT | Performed by: PSYCHOLOGIST

## 2023-11-06 NOTE — PATIENT INSTRUCTIONS
Local places to receive PTSD treatment:  [address]: [phone] [address] [facility]  [place]: [phone] At 3 Locations:                             [facility]: [address] [facility], [address], [facility], [address]                             [place]: [address], [facility], [address]                             HOSP [facility] [place], [facility] by [facility] DR JORDIN DONAHUE: [address] [facility], [address] [facility], [address] [facility]  Dr. Yanet Dsouza: [phone] JUANITA ESCOTO, MVIACFKEX  Dr. Nazanin Crain: [phone] [facility], [address] [place] [facility] [employer]:  [phone]  [address] [place] [address], [address]             (Currently accepting: Alexei Cantu, [address] [facility], [place], [address] [facility], 1 [facility],               [address], [address] [facility])    There are four types of evidence-based treatments for PTSD, including:  - Cognitive Processing Therapy (CPT): A cognitive-behavioral therapy that focuses on thoughts and feelings. The focus is on identifying how traumatic experiences changed the patient's thoughts and beliefs and how the patient's thoughts influence current feelings and behaviors. Patients identify and challenge unhelpful thoughts through structured therapy sessions and practice assignments.     -Prolonged Exposure (PE): A cognitive-behavioral treatment that focuses on decreasing symptoms of PTSD by addressing the common causes and treatments in four ways-education about treatment and common reactions to trauma, breathing retraining, in vivo (\"in real life\") exposure and imaginal exposure. The therapy allows patients to work through painful memories in a safe and supportive environment and engage with activities they have been avoiding because of the trauma.     -Cognitive Behavioral Conjoint Therapy (CBCT): A treatment for couples that combines components of CPT, PE, and couples therapy to treat the symptoms of PTSD.  This therapy can be conducted along with Parent Management Training

## 2023-11-06 NOTE — PROGRESS NOTES
Behavioral Health Consultation  Santhosh Taylor, Ph.D.  Psychologist  11/6/2023  1:34 PM      Time spent with Patient: 26 minutes  This is patient's second  [facility] appointment. Reason for Consult:    Chief Complaint   Patient presents with    Depression       Feedback given to PCP. S:  Pt seen for f/u of depression. Pt reported improving mood and sxs. Has some difficult days, but generally doing okay. Discussed planning for holidays and grief during holidays. Typically spent Koki and New Years w Gilleslatanya Benjamin. Reviewed ways to honor friend in holidays. Been thinking more about ways Bryon attempted to end his life and trying to make sense of things. Grappling now with the ways her daughter was impacted by Bryon's mental illness, daughter was 8 when pt  him and his mental illness became obvious shortly after.  Has discussed w daughter and daughter agrees that it was difficult, but does not voice blame toward pt.     O:  MSE:    Appearance    alert, cooperative  Appetite normal  Sleep disturbance Yes  Fatigue Yes  Loss of pleasure No  Impulsive behavior No  Speech    spontaneous, normal rate, normal volume, and well articulated  Mood    Anxious  Affect    normal affect  Thought Content    intact and cognitive distortions  Thought Process    linear, goal directed, and coherent  Associations    logical connections  Insight    Fair  Judgment    Intact  Orientation    oriented to person, place, time, and general circumstances  Memory    recent and remote memory intact  Attention/Concentration    intact  Morbid ideation No  Suicide Assessment    no suicidal ideation    History:  Social History:   Social History     Socioeconomic History    Marital status: Legally      Spouse name: Not on file    Number of children: Not on file    Years of education: Not on file    Highest education level: Not on file   Occupational History    Not on file   Tobacco Use    Smoking status: Never    Smokeless tobacco: Never

## 2023-12-01 DIAGNOSIS — F43.10 PTSD (POST-TRAUMATIC STRESS DISORDER): ICD-10-CM

## 2023-12-01 RX ORDER — AMITRIPTYLINE HYDROCHLORIDE 100 MG/1
100 TABLET ORAL NIGHTLY
Qty: 90 TABLET | Refills: 0 | Status: SHIPPED | OUTPATIENT
Start: 2023-12-01

## 2023-12-12 ENCOUNTER — OFFICE VISIT (OUTPATIENT)
Dept: INTERNAL MEDICINE CLINIC | Age: 68
End: 2023-12-12
Payer: MEDICARE

## 2023-12-12 VITALS
WEIGHT: 152 LBS | SYSTOLIC BLOOD PRESSURE: 124 MMHG | BODY MASS INDEX: 30.64 KG/M2 | HEIGHT: 59 IN | HEART RATE: 100 BPM | OXYGEN SATURATION: 98 % | DIASTOLIC BLOOD PRESSURE: 72 MMHG

## 2023-12-12 DIAGNOSIS — F32.A ANXIETY AND DEPRESSION: ICD-10-CM

## 2023-12-12 DIAGNOSIS — I10 ESSENTIAL HYPERTENSION: ICD-10-CM

## 2023-12-12 DIAGNOSIS — E55.9 VITAMIN D DEFICIENCY: ICD-10-CM

## 2023-12-12 DIAGNOSIS — E78.2 MIXED HYPERLIPIDEMIA: ICD-10-CM

## 2023-12-12 DIAGNOSIS — L60.2 LONG TOENAIL: ICD-10-CM

## 2023-12-12 DIAGNOSIS — E10.9 TYPE 1 DIABETES MELLITUS WITHOUT COMPLICATION (HCC): ICD-10-CM

## 2023-12-12 DIAGNOSIS — M25.511 CHRONIC RIGHT SHOULDER PAIN: ICD-10-CM

## 2023-12-12 DIAGNOSIS — G89.29 CHRONIC RIGHT SHOULDER PAIN: ICD-10-CM

## 2023-12-12 DIAGNOSIS — A60.00 GENITAL HERPES SIMPLEX, UNSPECIFIED SITE: ICD-10-CM

## 2023-12-12 DIAGNOSIS — F43.10 PTSD (POST-TRAUMATIC STRESS DISORDER): ICD-10-CM

## 2023-12-12 DIAGNOSIS — M25.521 RIGHT ELBOW PAIN: ICD-10-CM

## 2023-12-12 DIAGNOSIS — J30.89 SEASONAL ALLERGIC RHINITIS DUE TO OTHER ALLERGIC TRIGGER: ICD-10-CM

## 2023-12-12 DIAGNOSIS — Z00.00 MEDICARE ANNUAL WELLNESS VISIT, SUBSEQUENT: Primary | ICD-10-CM

## 2023-12-12 DIAGNOSIS — Z23 NEED FOR VACCINATION: ICD-10-CM

## 2023-12-12 DIAGNOSIS — M54.50 LUMBAR PAIN: ICD-10-CM

## 2023-12-12 DIAGNOSIS — R19.5 POSITIVE FIT (FECAL IMMUNOCHEMICAL TEST): ICD-10-CM

## 2023-12-12 DIAGNOSIS — M25.531 RIGHT WRIST PAIN: ICD-10-CM

## 2023-12-12 DIAGNOSIS — F41.9 ANXIETY AND DEPRESSION: ICD-10-CM

## 2023-12-12 PROCEDURE — 3078F DIAST BP <80 MM HG: CPT | Performed by: NURSE PRACTITIONER

## 2023-12-12 PROCEDURE — 3074F SYST BP LT 130 MM HG: CPT | Performed by: NURSE PRACTITIONER

## 2023-12-12 PROCEDURE — G0439 PPPS, SUBSEQ VISIT: HCPCS | Performed by: NURSE PRACTITIONER

## 2023-12-12 PROCEDURE — 99214 OFFICE O/P EST MOD 30 MIN: CPT | Performed by: NURSE PRACTITIONER

## 2023-12-12 PROCEDURE — G0008 ADMIN INFLUENZA VIRUS VAC: HCPCS | Performed by: NURSE PRACTITIONER

## 2023-12-12 PROCEDURE — 1123F ACP DISCUSS/DSCN MKR DOCD: CPT | Performed by: NURSE PRACTITIONER

## 2023-12-12 PROCEDURE — 90694 VACC AIIV4 NO PRSRV 0.5ML IM: CPT | Performed by: NURSE PRACTITIONER

## 2023-12-12 PROCEDURE — 3044F HG A1C LEVEL LT 7.0%: CPT | Performed by: NURSE PRACTITIONER

## 2023-12-12 RX ORDER — PRAVASTATIN SODIUM 40 MG
TABLET ORAL
Qty: 90 TABLET | Refills: 1 | Status: SHIPPED | OUTPATIENT
Start: 2023-12-12

## 2023-12-12 RX ORDER — AMITRIPTYLINE HYDROCHLORIDE 100 MG/1
100 TABLET ORAL NIGHTLY
Qty: 90 TABLET | Refills: 0 | Status: SHIPPED | OUTPATIENT
Start: 2023-12-12

## 2023-12-12 RX ORDER — SERTRALINE HYDROCHLORIDE 100 MG/1
TABLET, FILM COATED ORAL
Qty: 90 TABLET | Refills: 1 | Status: SHIPPED | OUTPATIENT
Start: 2023-12-12

## 2023-12-12 RX ORDER — FLUTICASONE PROPIONATE 50 MCG
1 SPRAY, SUSPENSION (ML) NASAL DAILY
Qty: 1 EACH | Refills: 2 | Status: SHIPPED | OUTPATIENT
Start: 2023-12-12

## 2023-12-12 RX ORDER — TIZANIDINE 2 MG/1
2 TABLET ORAL NIGHTLY PRN
Qty: 90 TABLET | Refills: 1 | Status: SHIPPED | OUTPATIENT
Start: 2023-12-12

## 2023-12-12 RX ORDER — ACYCLOVIR 200 MG/1
CAPSULE ORAL
Qty: 360 CAPSULE | Refills: 0 | Status: SHIPPED | OUTPATIENT
Start: 2023-12-12

## 2023-12-12 RX ORDER — CHOLECALCIFEROL (VITAMIN D3) 125 MCG
CAPSULE ORAL
Qty: 180 TABLET | Refills: 1 | Status: SHIPPED | OUTPATIENT
Start: 2023-12-12

## 2023-12-12 ASSESSMENT — PATIENT HEALTH QUESTIONNAIRE - PHQ9
SUM OF ALL RESPONSES TO PHQ QUESTIONS 1-9: 2
SUM OF ALL RESPONSES TO PHQ9 QUESTIONS 1 & 2: 0
3. TROUBLE FALLING OR STAYING ASLEEP: 0
SUM OF ALL RESPONSES TO PHQ QUESTIONS 1-9: 2
SUM OF ALL RESPONSES TO PHQ QUESTIONS 1-9: 2
2. FEELING DOWN, DEPRESSED OR HOPELESS: 0
9. THOUGHTS THAT YOU WOULD BE BETTER OFF DEAD, OR OF HURTING YOURSELF: 0
5. POOR APPETITE OR OVEREATING: 0
6. FEELING BAD ABOUT YOURSELF - OR THAT YOU ARE A FAILURE OR HAVE LET YOURSELF OR YOUR FAMILY DOWN: 1
1. LITTLE INTEREST OR PLEASURE IN DOING THINGS: 0
SUM OF ALL RESPONSES TO PHQ QUESTIONS 1-9: 2
8. MOVING OR SPEAKING SO SLOWLY THAT OTHER PEOPLE COULD HAVE NOTICED. OR THE OPPOSITE, BEING SO FIGETY OR RESTLESS THAT YOU HAVE BEEN MOVING AROUND A LOT MORE THAN USUAL: 0
4. FEELING TIRED OR HAVING LITTLE ENERGY: 0
10. IF YOU CHECKED OFF ANY PROBLEMS, HOW DIFFICULT HAVE THESE PROBLEMS MADE IT FOR YOU TO DO YOUR WORK, TAKE CARE OF THINGS AT HOME, OR GET ALONG WITH OTHER PEOPLE: 0
7. TROUBLE CONCENTRATING ON THINGS, SUCH AS READING THE NEWSPAPER OR WATCHING TELEVISION: 1

## 2023-12-12 ASSESSMENT — ENCOUNTER SYMPTOMS
SHORTNESS OF BREATH: 0
NAUSEA: 0
WHEEZING: 0
VOMITING: 0
COUGH: 0
ABDOMINAL PAIN: 0
CONSTIPATION: 0
DIARRHEA: 0

## 2023-12-12 NOTE — PROGRESS NOTES
Medicare Annual Wellness Visit    Darius Mc is here for Medicare AWV    Assessment & Plan   Medicare annual wellness visit, subsequent  -     [facility]  Type 1 diabetes mellitus without complication ([address])  -     MICROALBUMIN / CREATININE URINE RATIO; Future  -     Diabetic Foot Exam  Essential hypertension  Anxiety and depression  PTSD (post-traumatic stress disorder)  -     amitriptyline (ELAVIL) 100 MG tablet; Take 1 tablet by mouth nightly, Disp-90 tablet, R-0Normal  -     sertraline (ZOLOFT) 100 MG tablet; TAKE 1 TABLET BY MOUTH ONCE DAILY, Disp-90 tablet, R-1Normal  Mixed hyperlipidemia  -     pravastatin (PRAVACHOL) 40 MG tablet; Take 1 tablet by mouth once daily, Disp-90 tablet, R-1Normal  Genital herpes simplex, unspecified site  -     acyclovir (ZOVIRAX) 200 MG capsule; Take 2 capsules by mouth twice daily, Disp-360 capsule, R-0Normal  Vitamin D deficiency  -     Cholecalciferol (VITAMIN D3) 50 MCG (2000 UT) TABS; TAKE 2 TABLETS BY MOUTH ONCE DAILY WITH BREAKFAST, Disp-180 tablet, R-1Normal  Seasonal allergic rhinitis due to other allergic trigger  -     fluticasone (FLONASE) 50 MCG/ACT nasal spray; 1 spray by Each Nostril route daily, Disp-1 each, R-2Normal  Positive FIT (fecal immunochemical test)  Lumbar pain  Need for vaccination  -     Influenza, FLUAD, (age 72 y+), IM, PF, 0.5 mL  Long toenail  -     [facility], DPM, Podiatry, [facility]  Chronic right shoulder pain  Right elbow pain  Right wrist pain    Recommendations for Preventive Services Due: see orders and patient instructions/AVS.  Recommended screening schedule for the next 5-10 years is provided to the patient in written form: see Patient Instructions/AVS.     Return in about 6 months (around 6/12/2024) for HTN/mood/HLD f/u and labs, or sooner if needed.      Subjective   See other OV note dated 12/12/23    Patient's complete Health Risk Assessment and screening values have been reviewed and are found in

## 2023-12-12 NOTE — PROGRESS NOTES
Office Visit   12/12/2023    Subjective:  Chief Complaint   Patient presents with    Medicare AWV     HPI:   Chelsea Cook is a 79 y.o. female who presents to the clinic today for follow up. T1DM- She follows with endocrine - Dr. Joyce Phipps. Asymptomatic. Had diabetic eye exam Jan 2023. HTN- using lifestyle modifications. Walking steps for exercise. Diet is reported as \"fairly\" healthy. Can't recall home readings. Asymptomatic. Denies chest pain, palpitations, shortness of breath, trouble breathing, lightheadedness, dizziness or blurred vision. Depression/PTSD/Anxiety-Pt is taking Zoloft 100 mg daily and amitriptyline 100 mg nightly. States she has been on this regimen for years and she would not like to switch. States her mood is doing ok. States she has lost many friends this year. Seeing psychology. Denies SI/HI. HLD- Allergy to atorvastatin. Taking pravastatin 40 mg without concerns/without side effects. Genital herpes- Pt reports that she takes acyclovir 400 mg once nightly- prescribed BID. She states she increases the dose with a flare- no recent flares. Vit D deficiency- taking supplements daily. Asymptomatic. Allergic rhinitis- well controlled with flonase. Denies side effects. Low back pains- was seeing ortho. Was getting epidurals. Seeing Dr. Fabienne iN. Right shoulder pains- present for years. chronic. Takes flexeril PRN-typically once nightly. Controlled. States she is not interested in cutting down- has been on this regimen for years. States she had carpal tunnel and elbow surgery. States \"it did nothing. \" States that she still has symptoms. She reports that this \"could take a while because you are a diabetic. \"    Review of Systems   Constitutional:  Negative for chills, fatigue and fever. Respiratory:  Negative for cough, shortness of breath and wheezing. Cardiovascular:  Negative for chest pain.    Gastrointestinal:  Negative for abdominal pain,

## 2023-12-12 NOTE — PATIENT INSTRUCTIONS
[facility]  [address] [facility], [address] [address] [facility] [address], [address]  [address] [facility]  Po More[place], [address] [facility], [address]   Phone: [phone]     Preventing Falls: Care Instructions  Injuries and health problems such as trouble walking or poor eyesight can increase your risk of falling. So can some medicines. But there are things you can do to help prevent falls. You can exercise to get stronger. You can also arrange your home to make it safer. Talk to your doctor about the medicines you take. Ask if any of them increase the risk of falls and whether they can be changed or stopped. Try to exercise regularly. It can help improve your strength and balance. This can help lower your risk of falling. Practice fall safety and prevention. Wear low-heeled shoes that fit well and give your feet good support. Talk to your doctor if you have foot problems that make this hard. Carry a cellphone or wear a medical alert device that you can use to call for help. Use stepladders instead of chairs to reach high objects. Don't climb if you're at risk for falls. Ask for help, if needed. Wear the correct eyeglasses, if you need them. Make your home safer. Remove rugs, cords, clutter, and furniture from walkways. Keep your house well lit. Use night-lights in hallways and bathrooms. Install and use sturdy handrails on stairways. Wear nonskid footwear, even inside. Don't walk barefoot or in socks without shoes. Be safe outside. Use handrails, curb cuts, and ramps whenever possible. Keep your hands free by using a shoulder bag or backpack. Try to walk in well-lit areas. Watch out for uneven ground, changes in pavement, and debris. Be careful in the winter. Walk on the grass or gravel when sidewalks are slippery. Use de-icer on steps and walkways. Add non-slip devices to shoes.     Put grab bars and nonskid mats in your

## 2023-12-13 LAB
CREAT UR-MCNC: 14.3 MG/DL (ref 28–259)
MICROALBUMIN UR DL<=1MG/L-MCNC: <1.2 MG/DL
MICROALBUMIN/CREAT UR: ABNORMAL MG/G (ref 0–30)

## 2023-12-18 ENCOUNTER — OFFICE VISIT (OUTPATIENT)
Dept: PSYCHOLOGY | Age: 68
End: 2023-12-18
Payer: MEDICARE

## 2023-12-18 DIAGNOSIS — F43.10 PTSD (POST-TRAUMATIC STRESS DISORDER): Primary | ICD-10-CM

## 2023-12-18 PROCEDURE — 90832 PSYTX W PT 30 MINUTES: CPT | Performed by: PSYCHOLOGIST

## 2023-12-18 PROCEDURE — 1123F ACP DISCUSS/DSCN MKR DOCD: CPT | Performed by: PSYCHOLOGIST

## 2023-12-18 NOTE — PROGRESS NOTES
Behavioral Health Consultation  Mary Hunter, Ph.D.  Psychologist  12/18/2023  1:35 PM      Time spent with Patient: 26 minutes  This is patient's third  [facility] appointment.    Reason for Consult:    Chief Complaint   Patient presents with    Depression       Feedback given to PCP.    S:  Pt seen for f/u of PTSD, depressed mood. Pt reported \"pretty good\" mood and sxs. Was able to honor her friend, Pushpa, in meaningful ways for Thanksgiving. Will spend Koki w her family.     Been checking blood sugar 8x/day out of anxiety. Fasting ranges from , so she feels like she need to check often. Upset her continuous glucose reader was denied by insurance, cannot afford $100/mo. Was dx'ed w diabetes at age 11. Daughter has glucose packs, has pt check glucose if she seems to be having a hard time w communication.     Agreed w tapering f/u plan.     O:  MSE:  Appearance    alert, cooperative  Appetite normal  Sleep disturbance Yes  Fatigue Yes  Loss of pleasure No  Impulsive behavior No  Speech    spontaneous, normal rate, normal volume, and well articulated  Mood    Anxious  Affect    normal affect  Thought Content    intact and cognitive distortions  Thought Process    linear, goal directed, and coherent  Associations    logical connections  Insight    Fair  Judgment    Intact  Orientation    oriented to person, place, time, and general circumstances  Memory    recent and remote memory intact  Attention/Concentration    intact  Morbid ideation No  Suicide Assessment    no suicidal ideation    History:  Social History:   Social History     Socioeconomic History    Marital status: Legally      Spouse name: Not on file    Number of children: Not on file    Years of education: Not on file    Highest education level: Not on file   Occupational History    Not on file   Tobacco Use    Smoking status: Never    Smokeless tobacco: Never   Vaping Use    Vaping Use: Never used   Substance and Sexual Activity    Alcohol

## 2024-02-22 ENCOUNTER — OFFICE VISIT (OUTPATIENT)
Dept: PSYCHOLOGY | Age: 69
End: 2024-02-22
Payer: MEDICARE

## 2024-02-22 DIAGNOSIS — F43.10 PTSD (POST-TRAUMATIC STRESS DISORDER): Primary | ICD-10-CM

## 2024-02-22 PROCEDURE — 1123F ACP DISCUSS/DSCN MKR DOCD: CPT | Performed by: PSYCHOLOGIST

## 2024-02-22 PROCEDURE — 90832 PSYTX W PT 30 MINUTES: CPT | Performed by: PSYCHOLOGIST

## 2024-02-22 ASSESSMENT — PATIENT HEALTH QUESTIONNAIRE - PHQ9
SUM OF ALL RESPONSES TO PHQ QUESTIONS 1-9: 3
2. FEELING DOWN, DEPRESSED OR HOPELESS: 0
5. POOR APPETITE OR OVEREATING: 1
7. TROUBLE CONCENTRATING ON THINGS, SUCH AS READING THE NEWSPAPER OR WATCHING TELEVISION: 0
8. MOVING OR SPEAKING SO SLOWLY THAT OTHER PEOPLE COULD HAVE NOTICED. OR THE OPPOSITE, BEING SO FIGETY OR RESTLESS THAT YOU HAVE BEEN MOVING AROUND A LOT MORE THAN USUAL: 0
SUM OF ALL RESPONSES TO PHQ QUESTIONS 1-9: 3
9. THOUGHTS THAT YOU WOULD BE BETTER OFF DEAD, OR OF HURTING YOURSELF: 0
SUM OF ALL RESPONSES TO PHQ QUESTIONS 1-9: 3
6. FEELING BAD ABOUT YOURSELF - OR THAT YOU ARE A FAILURE OR HAVE LET YOURSELF OR YOUR FAMILY DOWN: 0
3. TROUBLE FALLING OR STAYING ASLEEP: 1
SUM OF ALL RESPONSES TO PHQ QUESTIONS 1-9: 3
4. FEELING TIRED OR HAVING LITTLE ENERGY: 1
10. IF YOU CHECKED OFF ANY PROBLEMS, HOW DIFFICULT HAVE THESE PROBLEMS MADE IT FOR YOU TO DO YOUR WORK, TAKE CARE OF THINGS AT HOME, OR GET ALONG WITH OTHER PEOPLE: 0
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
## 2024-03-05 DIAGNOSIS — A60.00 GENITAL HERPES SIMPLEX, UNSPECIFIED SITE: ICD-10-CM

## 2024-03-06 RX ORDER — ACYCLOVIR 200 MG/1
CAPSULE ORAL
Qty: 360 CAPSULE | Refills: 0 | Status: SHIPPED | OUTPATIENT
Start: 2024-03-06

## 2024-03-13 ENCOUNTER — OFFICE VISIT (OUTPATIENT)
Dept: ORTHOPEDIC SURGERY | Age: 69
End: 2024-03-13

## 2024-03-13 VITALS — HEIGHT: 59 IN | WEIGHT: 150 LBS | BODY MASS INDEX: 30.24 KG/M2

## 2024-03-13 DIAGNOSIS — M65.4 DE QUERVAIN'S TENOSYNOVITIS, LEFT: ICD-10-CM

## 2024-03-13 DIAGNOSIS — R20.0 NUMBNESS OF RIGHT HAND: ICD-10-CM

## 2024-03-13 DIAGNOSIS — S69.92XA LEFT WRIST INJURY, INITIAL ENCOUNTER: ICD-10-CM

## 2024-03-13 DIAGNOSIS — M18.11 OSTEOARTHRITIS OF CARPOMETACARPAL (CMC) JOINT OF RIGHT THUMB, UNSPECIFIED OSTEOARTHRITIS TYPE: ICD-10-CM

## 2024-03-13 DIAGNOSIS — E11.9 TYPE 2 DIABETES MELLITUS WITHOUT COMPLICATION, WITH LONG-TERM CURRENT USE OF INSULIN (HCC): ICD-10-CM

## 2024-03-13 DIAGNOSIS — Z79.4 TYPE 2 DIABETES MELLITUS WITHOUT COMPLICATION, WITH LONG-TERM CURRENT USE OF INSULIN (HCC): ICD-10-CM

## 2024-03-13 DIAGNOSIS — Z98.890 HISTORY OF CARPAL TUNNEL RELEASE: ICD-10-CM

## 2024-03-13 RX ORDER — CELECOXIB 200 MG/1
200 CAPSULE ORAL DAILY
Qty: 30 CAPSULE | Refills: 1 | Status: SHIPPED | OUTPATIENT
Start: 2024-03-13

## 2024-03-13 NOTE — PROGRESS NOTES
sensation, increased swelling or worsening of the condition.    Fátima Mckeon Titan Wrist and Thumb Brace     Patient was prescribed a Fátima Mckeon Titan Wrist and Thumb Brace.  The left wrist and thumb will require stabilization / immobilization from this semi-rigid / rigid orthosis to improve their function.  The orthosis will assist in protecting the affected area, provide functional support and facilitate healing.    The patient was educated and fit by a healthcare professional with expert knowledge and specialization in brace application while under the direct supervision of the treating physician.  Verbal and written instructions for the use of and application of this item were provided.   They were instructed to contact the office immediately should the brace result in increased pain, decreased sensation, increased swelling or worsening of the condition.           Disclaimer:    \"This note was dictated with voice recognition software. Though review and correction are routine, we apologize for any errors.\"

## 2024-03-27 ENCOUNTER — OFFICE VISIT (OUTPATIENT)
Dept: ORTHOPEDIC SURGERY | Age: 69
End: 2024-03-27
Payer: MEDICARE

## 2024-03-27 VITALS — WEIGHT: 149.91 LBS | HEIGHT: 59 IN | BODY MASS INDEX: 30.22 KG/M2

## 2024-03-27 DIAGNOSIS — Z98.890 HISTORY OF CARPAL TUNNEL RELEASE: ICD-10-CM

## 2024-03-27 DIAGNOSIS — M18.11 OSTEOARTHRITIS OF CARPOMETACARPAL (CMC) JOINT OF RIGHT THUMB, UNSPECIFIED OSTEOARTHRITIS TYPE: ICD-10-CM

## 2024-03-27 DIAGNOSIS — R20.0 NUMBNESS OF RIGHT HAND: ICD-10-CM

## 2024-03-27 DIAGNOSIS — M65.4 DE QUERVAIN'S TENOSYNOVITIS, LEFT: Primary | ICD-10-CM

## 2024-03-27 PROCEDURE — 99213 OFFICE O/P EST LOW 20 MIN: CPT | Performed by: INTERNAL MEDICINE

## 2024-03-27 PROCEDURE — 1123F ACP DISCUSS/DSCN MKR DOCD: CPT | Performed by: INTERNAL MEDICINE

## 2024-04-01 NOTE — PROGRESS NOTES
Chief Complaint:   Chief Complaint   Patient presents with    Wrist Pain     left, feeling better, brace helpful, has been resting it, and is afraid that w/increased activities it will be aggravated again; sees Dr. Mullins next week for R hand/wrist/elbow          History of Present Illness:       Patient is a 69 y.o. female returns follow up for the above complaint. The patient was last seen approximately 2 weeksago. The symptoms are improving since the last visit. The patient has had no further testing for the problem.      Significant benefit from the trial thumb spica wrist splint immobilization on the left and right thumb spica splint immobilization on the right    Numbness involving the hand remains prominent.  She is scheduled for follow-up with hand specialty surgery next week.    She has started Celebrex in the interim and plans to start Voltaren gel once available at the pharmacy    No mechanical symptoms localizing to the left wrist or right thumb    She denies any subjective instability of the hand or wrist bilaterally    Pain levels 0/10 today     Past Medical History:        Past Medical History:   Diagnosis Date    Anxiety     Arthritis     Depression     Diabetes mellitus (HCC)     Genital herpes     Hyperlipidemia     Hypertension     past hx    PTSD (post-traumatic stress disorder)     Vitamin D deficiency         Present Medications:         Current Outpatient Medications   Medication Sig Dispense Refill    diclofenac sodium (VOLTAREN) 1 % GEL Apply 2 to 4 g 3 times daily for 2 weeks then twice daily as needed thereafter 150 g 3    celecoxib (CELEBREX) 200 MG capsule Take 1 capsule by mouth daily For 10 days then daily as needed thereafter 30 capsule 1    acyclovir (ZOVIRAX) 200 MG capsule Take 2 capsules by mouth twice daily 360 capsule 0    amitriptyline (ELAVIL) 100 MG tablet Take 1 tablet by mouth nightly 90 tablet 0    Cholecalciferol (VITAMIN D3) 50 MCG (2000 UT) TABS TAKE 2 TABLETS BY

## 2024-05-08 ENCOUNTER — OFFICE VISIT (OUTPATIENT)
Dept: ORTHOPEDIC SURGERY | Age: 69
End: 2024-05-08
Payer: MEDICARE

## 2024-05-08 VITALS — WEIGHT: 149 LBS | RESPIRATION RATE: 16 BRPM | BODY MASS INDEX: 30.04 KG/M2 | HEIGHT: 59 IN

## 2024-05-08 DIAGNOSIS — R20.0 HAND NUMBNESS: Primary | ICD-10-CM

## 2024-05-08 PROCEDURE — 1123F ACP DISCUSS/DSCN MKR DOCD: CPT | Performed by: ORTHOPAEDIC SURGERY

## 2024-05-08 PROCEDURE — 99213 OFFICE O/P EST LOW 20 MIN: CPT | Performed by: ORTHOPAEDIC SURGERY

## 2024-05-08 NOTE — PATIENT INSTRUCTIONS
Thank you for choosing [facility] for your Hand and Upper Extremity needs.  If we can be of any further assistance to you, please do not hesitate to contact us.    Office Phone Number:  [phone]  or  [phone]

## 2024-05-08 NOTE — PROGRESS NOTES
extremitiy condition.  If she feels that she continues to be feeling and functioning well, she may choose not to seek any further follow-up or treatment at her discretion.  I will remain available to continue her care at any time in the future.

## 2024-05-14 DIAGNOSIS — M18.11 OSTEOARTHRITIS OF CARPOMETACARPAL (CMC) JOINT OF RIGHT THUMB, UNSPECIFIED OSTEOARTHRITIS TYPE: ICD-10-CM

## 2024-05-14 DIAGNOSIS — M65.4 DE QUERVAIN'S TENOSYNOVITIS, LEFT: ICD-10-CM

## 2024-05-14 RX ORDER — CELECOXIB 200 MG/1
200 CAPSULE ORAL DAILY PRN
Qty: 30 CAPSULE | Refills: 1 | Status: SHIPPED | OUTPATIENT
Start: 2024-05-14

## 2024-05-22 ENCOUNTER — OFFICE VISIT (OUTPATIENT)
Dept: PSYCHOLOGY | Age: 69
End: 2024-05-22
Payer: MEDICARE

## 2024-05-22 DIAGNOSIS — F43.10 PTSD (POST-TRAUMATIC STRESS DISORDER): Primary | ICD-10-CM

## 2024-05-22 PROCEDURE — 1123F ACP DISCUSS/DSCN MKR DOCD: CPT | Performed by: PSYCHOLOGIST

## 2024-05-22 PROCEDURE — 90832 PSYTX W PT 30 MINUTES: CPT | Performed by: PSYCHOLOGIST

## 2024-05-22 ASSESSMENT — PATIENT HEALTH QUESTIONNAIRE - PHQ9
SUM OF ALL RESPONSES TO PHQ QUESTIONS 1-9: 4
SUM OF ALL RESPONSES TO PHQ QUESTIONS 1-9: 4
2. FEELING DOWN, DEPRESSED OR HOPELESS: SEVERAL DAYS
5. POOR APPETITE OR OVEREATING: NOT AT ALL
6. FEELING BAD ABOUT YOURSELF - OR THAT YOU ARE A FAILURE OR HAVE LET YOURSELF OR YOUR FAMILY DOWN: SEVERAL DAYS
3. TROUBLE FALLING OR STAYING ASLEEP: NOT AT ALL
SUM OF ALL RESPONSES TO PHQ9 QUESTIONS 1 & 2: 2
7. TROUBLE CONCENTRATING ON THINGS, SUCH AS READING THE NEWSPAPER OR WATCHING TELEVISION: NOT AT ALL
9. THOUGHTS THAT YOU WOULD BE BETTER OFF DEAD, OR OF HURTING YOURSELF: NOT AT ALL
SUM OF ALL RESPONSES TO PHQ QUESTIONS 1-9: 4
8. MOVING OR SPEAKING SO SLOWLY THAT OTHER PEOPLE COULD HAVE NOTICED. OR THE OPPOSITE, BEING SO FIGETY OR RESTLESS THAT YOU HAVE BEEN MOVING AROUND A LOT MORE THAN USUAL: NOT AT ALL
1. LITTLE INTEREST OR PLEASURE IN DOING THINGS: SEVERAL DAYS
4. FEELING TIRED OR HAVING LITTLE ENERGY: SEVERAL DAYS
10. IF YOU CHECKED OFF ANY PROBLEMS, HOW DIFFICULT HAVE THESE PROBLEMS MADE IT FOR YOU TO DO YOUR WORK, TAKE CARE OF THINGS AT HOME, OR GET ALONG WITH OTHER PEOPLE: NOT DIFFICULT AT ALL
SUM OF ALL RESPONSES TO PHQ QUESTIONS 1-9: 4

## 2024-05-22 ASSESSMENT — ANXIETY QUESTIONNAIRES
6. BECOMING EASILY ANNOYED OR IRRITABLE: NOT AT ALL
1. FEELING NERVOUS, ANXIOUS, OR ON EDGE: SEVERAL DAYS
5. BEING SO RESTLESS THAT IT IS HARD TO SIT STILL: SEVERAL DAYS
2. NOT BEING ABLE TO STOP OR CONTROL WORRYING: SEVERAL DAYS
4. TROUBLE RELAXING: NOT AT ALL
3. WORRYING TOO MUCH ABOUT DIFFERENT THINGS: SEVERAL DAYS
7. FEELING AFRAID AS IF SOMETHING AWFUL MIGHT HAPPEN: SEVERAL DAYS
GAD7 TOTAL SCORE: 5
IF YOU CHECKED OFF ANY PROBLEMS ON THIS QUESTIONNAIRE, HOW DIFFICULT HAVE THESE PROBLEMS MADE IT FOR YOU TO DO YOUR WORK, TAKE CARE OF THINGS AT HOME, OR GET ALONG WITH OTHER PEOPLE: SOMEWHAT DIFFICULT
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
## 2024-05-23 DIAGNOSIS — F43.10 PTSD (POST-TRAUMATIC STRESS DISORDER): ICD-10-CM

## 2024-05-24 RX ORDER — AMITRIPTYLINE HYDROCHLORIDE 100 MG/1
100 TABLET ORAL NIGHTLY
Qty: 90 TABLET | Refills: 0 | Status: SHIPPED | OUTPATIENT
Start: 2024-05-24

## 2024-06-12 DIAGNOSIS — E78.2 MIXED HYPERLIPIDEMIA: ICD-10-CM

## 2024-06-13 RX ORDER — TIZANIDINE 2 MG/1
TABLET ORAL
Qty: 30 TABLET | Refills: 0 | Status: SHIPPED | OUTPATIENT
Start: 2024-06-13

## 2024-06-13 RX ORDER — PRAVASTATIN SODIUM 40 MG
TABLET ORAL
Qty: 30 TABLET | Refills: 0 | Status: SHIPPED | OUTPATIENT
Start: 2024-06-13

## 2024-06-18 ENCOUNTER — OFFICE VISIT (OUTPATIENT)
Dept: INTERNAL MEDICINE CLINIC | Age: 69
End: 2024-06-18
Payer: MEDICARE

## 2024-06-18 VITALS
BODY MASS INDEX: 28.83 KG/M2 | DIASTOLIC BLOOD PRESSURE: 64 MMHG | SYSTOLIC BLOOD PRESSURE: 124 MMHG | HEIGHT: 59 IN | OXYGEN SATURATION: 96 % | WEIGHT: 143 LBS | HEART RATE: 100 BPM

## 2024-06-18 DIAGNOSIS — I10 ESSENTIAL HYPERTENSION: ICD-10-CM

## 2024-06-18 DIAGNOSIS — M25.511 CHRONIC RIGHT SHOULDER PAIN: ICD-10-CM

## 2024-06-18 DIAGNOSIS — Z00.00 MEDICARE ANNUAL WELLNESS VISIT, SUBSEQUENT: Primary | ICD-10-CM

## 2024-06-18 DIAGNOSIS — Z12.11 SCREEN FOR COLON CANCER: ICD-10-CM

## 2024-06-18 DIAGNOSIS — E78.2 MIXED HYPERLIPIDEMIA: ICD-10-CM

## 2024-06-18 DIAGNOSIS — A60.00 GENITAL HERPES SIMPLEX, UNSPECIFIED SITE: ICD-10-CM

## 2024-06-18 DIAGNOSIS — F41.9 ANXIETY AND DEPRESSION: ICD-10-CM

## 2024-06-18 DIAGNOSIS — F32.A ANXIETY AND DEPRESSION: ICD-10-CM

## 2024-06-18 DIAGNOSIS — E10.9 TYPE 1 DIABETES MELLITUS WITHOUT COMPLICATION (HCC): ICD-10-CM

## 2024-06-18 DIAGNOSIS — Z71.85 VACCINE COUNSELING: ICD-10-CM

## 2024-06-18 DIAGNOSIS — G89.29 CHRONIC RIGHT SHOULDER PAIN: ICD-10-CM

## 2024-06-18 DIAGNOSIS — J30.89 SEASONAL ALLERGIC RHINITIS DUE TO OTHER ALLERGIC TRIGGER: ICD-10-CM

## 2024-06-18 DIAGNOSIS — E55.9 VITAMIN D DEFICIENCY: ICD-10-CM

## 2024-06-18 DIAGNOSIS — M54.50 LUMBAR PAIN: ICD-10-CM

## 2024-06-18 DIAGNOSIS — F43.10 PTSD (POST-TRAUMATIC STRESS DISORDER): ICD-10-CM

## 2024-06-18 PROCEDURE — 99214 OFFICE O/P EST MOD 30 MIN: CPT | Performed by: NURSE PRACTITIONER

## 2024-06-18 PROCEDURE — G0439 PPPS, SUBSEQ VISIT: HCPCS | Performed by: NURSE PRACTITIONER

## 2024-06-18 PROCEDURE — 3074F SYST BP LT 130 MM HG: CPT | Performed by: NURSE PRACTITIONER

## 2024-06-18 PROCEDURE — 3078F DIAST BP <80 MM HG: CPT | Performed by: NURSE PRACTITIONER

## 2024-06-18 PROCEDURE — 1123F ACP DISCUSS/DSCN MKR DOCD: CPT | Performed by: NURSE PRACTITIONER

## 2024-06-18 RX ORDER — SERTRALINE HYDROCHLORIDE 100 MG/1
TABLET, FILM COATED ORAL
Qty: 90 TABLET | Refills: 1 | Status: SHIPPED | OUTPATIENT
Start: 2024-06-18

## 2024-06-18 RX ORDER — ACYCLOVIR 200 MG/1
400 CAPSULE ORAL 2 TIMES DAILY
Qty: 360 CAPSULE | Refills: 1 | Status: SHIPPED | OUTPATIENT
Start: 2024-06-18

## 2024-06-18 RX ORDER — CHOLECALCIFEROL (VITAMIN D3) 125 MCG
CAPSULE ORAL
Qty: 180 TABLET | Refills: 1 | Status: SHIPPED | OUTPATIENT
Start: 2024-06-18

## 2024-06-18 RX ORDER — FLUTICASONE PROPIONATE 50 MCG
1 SPRAY, SUSPENSION (ML) NASAL DAILY
Qty: 1 EACH | Refills: 2 | Status: SHIPPED | OUTPATIENT
Start: 2024-06-18

## 2024-06-18 RX ORDER — TIZANIDINE 2 MG/1
TABLET ORAL
Qty: 90 TABLET | Refills: 1 | Status: SHIPPED | OUTPATIENT
Start: 2024-06-18

## 2024-06-18 RX ORDER — AMITRIPTYLINE HYDROCHLORIDE 100 MG/1
100 TABLET ORAL NIGHTLY
Qty: 90 TABLET | Refills: 1 | Status: SHIPPED | OUTPATIENT
Start: 2024-06-18

## 2024-06-18 RX ORDER — PRAVASTATIN SODIUM 40 MG
40 TABLET ORAL DAILY
Qty: 90 TABLET | Refills: 1 | Status: SHIPPED | OUTPATIENT
Start: 2024-06-18

## 2024-06-18 SDOH — ECONOMIC STABILITY: FOOD INSECURITY: WITHIN THE PAST 12 MONTHS, THE FOOD YOU BOUGHT JUST DIDN'T LAST AND YOU DIDN'T HAVE MONEY TO GET MORE.: NEVER TRUE

## 2024-06-18 SDOH — ECONOMIC STABILITY: FOOD INSECURITY: WITHIN THE PAST 12 MONTHS, YOU WORRIED THAT YOUR FOOD WOULD RUN OUT BEFORE YOU GOT MONEY TO BUY MORE.: NEVER TRUE

## 2024-06-18 SDOH — ECONOMIC STABILITY: INCOME INSECURITY: HOW HARD IS IT FOR YOU TO PAY FOR THE VERY BASICS LIKE FOOD, HOUSING, MEDICAL CARE, AND HEATING?: NOT HARD AT ALL

## 2024-06-18 ASSESSMENT — ENCOUNTER SYMPTOMS
VOMITING: 0
DIARRHEA: 0
NAUSEA: 0
ABDOMINAL PAIN: 0
COUGH: 0
WHEEZING: 0
CONSTIPATION: 0
SHORTNESS OF BREATH: 0

## 2024-06-18 ASSESSMENT — PATIENT HEALTH QUESTIONNAIRE - PHQ9
6. FEELING BAD ABOUT YOURSELF - OR THAT YOU ARE A FAILURE OR HAVE LET YOURSELF OR YOUR FAMILY DOWN: NOT AT ALL
5. POOR APPETITE OR OVEREATING: NOT AT ALL
4. FEELING TIRED OR HAVING LITTLE ENERGY: SEVERAL DAYS
8. MOVING OR SPEAKING SO SLOWLY THAT OTHER PEOPLE COULD HAVE NOTICED. OR THE OPPOSITE, BEING SO FIGETY OR RESTLESS THAT YOU HAVE BEEN MOVING AROUND A LOT MORE THAN USUAL: NOT AT ALL
10. IF YOU CHECKED OFF ANY PROBLEMS, HOW DIFFICULT HAVE THESE PROBLEMS MADE IT FOR YOU TO DO YOUR WORK, TAKE CARE OF THINGS AT HOME, OR GET ALONG WITH OTHER PEOPLE: NOT DIFFICULT AT ALL
SUM OF ALL RESPONSES TO PHQ QUESTIONS 1-9: 2
9. THOUGHTS THAT YOU WOULD BE BETTER OFF DEAD, OR OF HURTING YOURSELF: NOT AT ALL
2. FEELING DOWN, DEPRESSED OR HOPELESS: NOT AT ALL
1. LITTLE INTEREST OR PLEASURE IN DOING THINGS: NOT AT ALL
3. TROUBLE FALLING OR STAYING ASLEEP: SEVERAL DAYS
SUM OF ALL RESPONSES TO PHQ QUESTIONS 1-9: 2
SUM OF ALL RESPONSES TO PHQ9 QUESTIONS 1 & 2: 0
SUM OF ALL RESPONSES TO PHQ QUESTIONS 1-9: 2
SUM OF ALL RESPONSES TO PHQ QUESTIONS 1-9: 2
7. TROUBLE CONCENTRATING ON THINGS, SUCH AS READING THE NEWSPAPER OR WATCHING TELEVISION: NOT AT ALL

## 2024-06-18 ASSESSMENT — LIFESTYLE VARIABLES
HOW MANY STANDARD DRINKS CONTAINING ALCOHOL DO YOU HAVE ON A TYPICAL DAY: PATIENT DOES NOT DRINK
HOW OFTEN DO YOU HAVE A DRINK CONTAINING ALCOHOL: NEVER

## 2024-06-18 NOTE — PROGRESS NOTES
Office Visit   6/18/2024    Subjective:  Chief Complaint   Patient presents with    Medicare AWV     HPI:   Emily Goodson is a 69 y.o. female who presents to the clinic today for follow up.    T1DM- She follows with endocrine - Dr. Mckeon .  Asymptomatic.  Had diabetic eye exam Jan 2023. Unable to recall fasting labs.     HTN- using lifestyle modifications.  Walking steps for exercise. Diet is reported as healthy.  Home BP - \"I don't know\" per pt- states she does check this.  Asymptomatic. Denies chest pain, palpitations, shortness of breath, trouble breathing, lightheadedness, dizziness or blurred vision.     Depression/PTSD/Anxiety-Pt is taking Zoloft 100 mg daily and amitriptyline 100 mg nightly. States she has been on this regimen for years and she would not like to switch. States her mood is doing well. Seeing psychology.   Denies SI/HI.      HLD- Allergy to atorvastatin. Taking pravastatin 40 mg without concerns/without side effects.     Genital herpes- Pt reports that she takes acyclovir 400 mg once nightly- prescribed BID. She states she increases the dose with a flare- no recent flares.     Vit D deficiency- taking supplements daily. Asymptomatic.      Allergic rhinitis- well controlled with flonase. Denies side effects.     Low back pains- seeing ortho. Was getting epidurals.     Right shoulder pains- present for years. chronic. Takes tizanidine PRN-typically once nightly. Controlled. States this medication works better and she feels better than on the flexeril. She would like to continue on this regimen PRN.    Review of Systems   Constitutional:  Negative for chills, fatigue, fever and unexpected weight change.   Eyes:  Negative for visual disturbance.   Respiratory:  Negative for cough, shortness of breath and wheezing.    Cardiovascular:  Negative for chest pain, palpitations and leg swelling.   Gastrointestinal:  Negative for abdominal pain, constipation, diarrhea, nausea and vomiting.   Skin:  
routine f/u, or sooner if needed.     Subjective   See other OV note dated 06/18/24    Patient's complete Health Risk Assessment and screening values have been reviewed and are found in Flowsheets. The following problems were reviewed today and where indicated follow up appointments were made and/or referrals ordered.    No Positive Risk Factors identified today.                              Objective   Vitals:    06/18/24 1456   BP: 124/64   Pulse: 100   SpO2: 96%   Weight: 64.9 kg (143 lb)   Height: 1.486 m (4' 10.5\")      Body mass index is 29.38 kg/m².          Allergies   Allergen Reactions    Atorvastatin Swelling    Lisinopril Other (See Comments)    Morphine Other (See Comments)     Patient reports it puts her in a coma    Naproxen Nausea Only    Oxycodone-Acetaminophen Other (See Comments)     Prior to Visit Medications    Medication Sig Taking? Authorizing Provider   pravastatin (PRAVACHOL) 40 MG tablet Take 1 tablet by mouth daily Yes Chelsey Barnes APRN - CNP   tiZANidine (ZANAFLEX) 2 MG tablet TAKE 1 TABLET BY MOUTH NIGHTLY AS NEEDED FOR MUSCLE SPASM Yes Chelsey Barnes APRN - CNP   amitriptyline (ELAVIL) 100 MG tablet Take 1 tablet by mouth nightly Yes Chelsey Barnes APRN - CNP   acyclovir (ZOVIRAX) 200 MG capsule Take 2 capsules by mouth 2 times daily Yes Chelsey Barnes APRN - CNP   Cholecalciferol (VITAMIN D3) 50 MCG (2000 UT) TABS TAKE 2 TABLETS BY MOUTH ONCE DAILY WITH BREAKFAST Yes Chelsey Barnes APRN - CNP   fluticasone (FLONASE) 50 MCG/ACT nasal spray 1 spray by Each Nostril route daily Yes Chelsey Barnes APRN - CNP   sertraline (ZOLOFT) 100 MG tablet TAKE 1 TABLET BY MOUTH ONCE DAILY Yes Chelsey Barnes APRN - CNP   celecoxib (CELEBREX) 200 MG capsule Take 1 capsule by mouth daily as needed for Pain Yes Fernando Armenta MD   diclofenac sodium (VOLTAREN) 1 % GEL Apply 2 to 4 g 3 times daily for 2 weeks then

## 2024-06-18 NOTE — PATIENT INSTRUCTIONS
Please get your fasting lab work (no food or drink for 10-12 hours prior besides water) completed M-F 730a-430p at our office. [facility] lab has walk-in hours available as well - no appointment is needed. We will call or mychart message you with your results.       Call to schedule screening colonoscopy for Oct     A Healthy Heart: Care Instructions  Overview     Coronary artery disease, also called heart disease, occurs when a substance called plaque builds up in the vessels that supply oxygen-rich blood to your heart muscle. This can narrow the blood vessels and reduce blood flow. A heart attack happens when blood flow is completely blocked. A high-fat diet, smoking, and other factors increase the risk of heart disease.  Your doctor has found that you have a chance of having heart disease. A heart-healthy lifestyle can help keep your heart healthy and prevent heart disease. This lifestyle includes eating healthy, being active, staying at a weight that's healthy for you, and not smoking or using tobacco. It also includes taking medicines as directed, managing other health conditions, and trying to get a healthy amount of sleep.  Follow-up care is a key part of your treatment and safety. Be sure to make and go to all appointments, and call your doctor if you are having problems. It's also a good idea to know your test results and keep a list of the medicines you take.  How can you care for yourself at home?  Diet    Use less salt when you cook and eat. This helps lower your blood pressure. Taste food before salting. Add only a little salt when you think you need it. With time, your taste buds will adjust to less salt.     Eat fewer snack items, fast foods, canned soups, and other high-salt, high-fat, processed foods.     Read food labels and try to avoid saturated and trans fats. They increase your risk of heart disease by raising cholesterol levels.     Limit the amount of solid fat--butter,

## 2024-07-10 DIAGNOSIS — M65.4 DE QUERVAIN'S TENOSYNOVITIS, LEFT: ICD-10-CM

## 2024-07-10 DIAGNOSIS — M18.11 OSTEOARTHRITIS OF CARPOMETACARPAL (CMC) JOINT OF RIGHT THUMB, UNSPECIFIED OSTEOARTHRITIS TYPE: ICD-10-CM

## 2024-07-10 RX ORDER — CELECOXIB 200 MG/1
CAPSULE ORAL
Qty: 30 CAPSULE | Refills: 1 | Status: SHIPPED | OUTPATIENT
Start: 2024-07-10

## 2024-07-10 NOTE — TELEPHONE ENCOUNTER
Rx Request: Celebrex    Last Filled: 05/14/2024 with 1 refill  Refill Due: 07/12/2024  Last OV: 03/27/2024  Follow Up:  None

## 2024-09-05 ENCOUNTER — OFFICE VISIT (OUTPATIENT)
Dept: PSYCHOLOGY | Age: 69
End: 2024-09-05
Payer: MEDICARE

## 2024-09-05 DIAGNOSIS — F43.10 PTSD (POST-TRAUMATIC STRESS DISORDER): Primary | ICD-10-CM

## 2024-09-05 PROCEDURE — 90832 PSYTX W PT 30 MINUTES: CPT | Performed by: PSYCHOLOGIST

## 2024-09-05 PROCEDURE — 1123F ACP DISCUSS/DSCN MKR DOCD: CPT | Performed by: PSYCHOLOGIST

## 2024-09-05 ASSESSMENT — PATIENT HEALTH QUESTIONNAIRE - PHQ9
SUM OF ALL RESPONSES TO PHQ QUESTIONS 1-9: 2
8. MOVING OR SPEAKING SO SLOWLY THAT OTHER PEOPLE COULD HAVE NOTICED. OR THE OPPOSITE, BEING SO FIGETY OR RESTLESS THAT YOU HAVE BEEN MOVING AROUND A LOT MORE THAN USUAL: NOT AT ALL
6. FEELING BAD ABOUT YOURSELF - OR THAT YOU ARE A FAILURE OR HAVE LET YOURSELF OR YOUR FAMILY DOWN: NOT AT ALL
SUM OF ALL RESPONSES TO PHQ QUESTIONS 1-9: 2
2. FEELING DOWN, DEPRESSED OR HOPELESS: NOT AT ALL
3. TROUBLE FALLING OR STAYING ASLEEP: NOT AT ALL
1. LITTLE INTEREST OR PLEASURE IN DOING THINGS: SEVERAL DAYS
SUM OF ALL RESPONSES TO PHQ QUESTIONS 1-9: 2
5. POOR APPETITE OR OVEREATING: NOT AT ALL
SUM OF ALL RESPONSES TO PHQ9 QUESTIONS 1 & 2: 1
4. FEELING TIRED OR HAVING LITTLE ENERGY: SEVERAL DAYS
SUM OF ALL RESPONSES TO PHQ QUESTIONS 1-9: 2
9. THOUGHTS THAT YOU WOULD BE BETTER OFF DEAD, OR OF HURTING YOURSELF: NOT AT ALL
7. TROUBLE CONCENTRATING ON THINGS, SUCH AS READING THE NEWSPAPER OR WATCHING TELEVISION: NOT AT ALL
10. IF YOU CHECKED OFF ANY PROBLEMS, HOW DIFFICULT HAVE THESE PROBLEMS MADE IT FOR YOU TO DO YOUR WORK, TAKE CARE OF THINGS AT HOME, OR GET ALONG WITH OTHER PEOPLE: NOT DIFFICULT AT ALL

## 2024-09-05 ASSESSMENT — ANXIETY QUESTIONNAIRES
4. TROUBLE RELAXING: NOT AT ALL
GAD7 TOTAL SCORE: 0
3. WORRYING TOO MUCH ABOUT DIFFERENT THINGS: NOT AT ALL
5. BEING SO RESTLESS THAT IT IS HARD TO SIT STILL: NOT AT ALL
2. NOT BEING ABLE TO STOP OR CONTROL WORRYING: NOT AT ALL
6. BECOMING EASILY ANNOYED OR IRRITABLE: NOT AT ALL
IF YOU CHECKED OFF ANY PROBLEMS ON THIS QUESTIONNAIRE, HOW DIFFICULT HAVE THESE PROBLEMS MADE IT FOR YOU TO DO YOUR WORK, TAKE CARE OF THINGS AT HOME, OR GET ALONG WITH OTHER PEOPLE: NOT DIFFICULT AT ALL
1. FEELING NERVOUS, ANXIOUS, OR ON EDGE: NOT AT ALL
7. FEELING AFRAID AS IF SOMETHING AWFUL MIGHT HAPPEN: NOT AT ALL

## 2024-10-08 ENCOUNTER — TELEPHONE (OUTPATIENT)
Dept: INTERNAL MEDICINE CLINIC | Age: 69
End: 2024-10-08

## 2024-10-08 NOTE — TELEPHONE ENCOUNTER
Spoke with patient and patient stated that she will get labs completed before her appointment in December.

## 2024-11-13 LAB
ESTIMATED AVERAGE GLUCOSE: NORMAL
HBA1C MFR BLD: 7 %

## 2024-12-09 DIAGNOSIS — F32.A ANXIETY AND DEPRESSION: ICD-10-CM

## 2024-12-09 DIAGNOSIS — F41.9 ANXIETY AND DEPRESSION: ICD-10-CM

## 2024-12-09 DIAGNOSIS — E78.2 MIXED HYPERLIPIDEMIA: ICD-10-CM

## 2024-12-09 DIAGNOSIS — E10.9 TYPE 1 DIABETES MELLITUS WITHOUT COMPLICATION (HCC): ICD-10-CM

## 2024-12-09 DIAGNOSIS — F43.10 PTSD (POST-TRAUMATIC STRESS DISORDER): ICD-10-CM

## 2024-12-09 LAB
ALBUMIN SERPL-MCNC: 4.2 G/DL (ref 3.4–5)
ALBUMIN/GLOB SERPL: 2.2 {RATIO} (ref 1.1–2.2)
ALP SERPL-CCNC: 65 U/L (ref 40–129)
ALT SERPL-CCNC: 10 U/L (ref 10–40)
ANION GAP SERPL CALCULATED.3IONS-SCNC: 10 MMOL/L (ref 3–16)
AST SERPL-CCNC: 19 U/L (ref 15–37)
BASOPHILS # BLD: 0.1 K/UL (ref 0–0.2)
BASOPHILS NFR BLD: 1 %
BILIRUB SERPL-MCNC: 0.5 MG/DL (ref 0–1)
BUN SERPL-MCNC: 14 MG/DL (ref 7–20)
CALCIUM SERPL-MCNC: 9.9 MG/DL (ref 8.3–10.6)
CHLORIDE SERPL-SCNC: 104 MMOL/L (ref 99–110)
CHOLEST SERPL-MCNC: 152 MG/DL (ref 0–199)
CO2 SERPL-SCNC: 27 MMOL/L (ref 21–32)
CREAT SERPL-MCNC: 0.8 MG/DL (ref 0.6–1.2)
DEPRECATED RDW RBC AUTO: 13.8 % (ref 12.4–15.4)
EOSINOPHIL # BLD: 0.2 K/UL (ref 0–0.6)
EOSINOPHIL NFR BLD: 3 %
GFR SERPLBLD CREATININE-BSD FMLA CKD-EPI: 80 ML/MIN/{1.73_M2}
GLUCOSE SERPL-MCNC: 182 MG/DL (ref 70–99)
HCT VFR BLD AUTO: 38 % (ref 36–48)
HDLC SERPL-MCNC: 56 MG/DL (ref 40–60)
HGB BLD-MCNC: 12.9 G/DL (ref 12–16)
LDL CHOLESTEROL: 80 MG/DL
LYMPHOCYTES # BLD: 1.2 K/UL (ref 1–5.1)
LYMPHOCYTES NFR BLD: 21.7 %
MCH RBC QN AUTO: 31.5 PG (ref 26–34)
MCHC RBC AUTO-ENTMCNC: 33.9 G/DL (ref 31–36)
MCV RBC AUTO: 92.9 FL (ref 80–100)
MONOCYTES # BLD: 0.4 K/UL (ref 0–1.3)
MONOCYTES NFR BLD: 7.5 %
NEUTROPHILS # BLD: 3.5 K/UL (ref 1.7–7.7)
NEUTROPHILS NFR BLD: 66.8 %
PLATELET # BLD AUTO: 266 K/UL (ref 135–450)
PMV BLD AUTO: 8.5 FL (ref 5–10.5)
POTASSIUM SERPL-SCNC: 4.4 MMOL/L (ref 3.5–5.1)
PROT SERPL-MCNC: 6.1 G/DL (ref 6.4–8.2)
RBC # BLD AUTO: 4.09 M/UL (ref 4–5.2)
SODIUM SERPL-SCNC: 141 MMOL/L (ref 136–145)
TRIGL SERPL-MCNC: 81 MG/DL (ref 0–150)
TSH SERPL DL<=0.005 MIU/L-ACNC: 1.89 UIU/ML (ref 0.27–4.2)
VLDLC SERPL CALC-MCNC: 16 MG/DL
WBC # BLD AUTO: 5.3 K/UL (ref 4–11)

## 2024-12-10 ENCOUNTER — TELEPHONE (OUTPATIENT)
Dept: INTERNAL MEDICINE CLINIC | Age: 69
End: 2024-12-10

## 2024-12-10 NOTE — TELEPHONE ENCOUNTER
Ice packs should help- twice to three times a day - 10 min at a time.   Call if symptoms worsen/fail to improve.

## 2024-12-10 NOTE — TELEPHONE ENCOUNTER
Pt had labs drawn yesterday and today she has a bruised hematoma from the lab draw site to left antecubital space. Area is tender to touch, discomfort when raising arm. She is wanting to know if there is anything to do for the area, apply ice, heat, etc. She sees Christina 12/16 but would like advice until then.

## 2024-12-16 ENCOUNTER — OFFICE VISIT (OUTPATIENT)
Dept: PSYCHOLOGY | Age: 69
End: 2024-12-16
Payer: MEDICARE

## 2024-12-16 DIAGNOSIS — F43.10 PTSD (POST-TRAUMATIC STRESS DISORDER): Primary | ICD-10-CM

## 2024-12-16 DIAGNOSIS — F43.21 ADJUSTMENT DISORDER WITH DEPRESSED MOOD: ICD-10-CM

## 2024-12-16 PROCEDURE — 1123F ACP DISCUSS/DSCN MKR DOCD: CPT | Performed by: PSYCHOLOGIST

## 2024-12-16 PROCEDURE — 90832 PSYTX W PT 30 MINUTES: CPT | Performed by: PSYCHOLOGIST
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
## 2024-12-18 ENCOUNTER — OFFICE VISIT (OUTPATIENT)
Dept: INTERNAL MEDICINE CLINIC | Age: 69
End: 2024-12-18

## 2024-12-18 VITALS
HEART RATE: 63 BPM | BODY MASS INDEX: 30.64 KG/M2 | OXYGEN SATURATION: 98 % | WEIGHT: 152 LBS | DIASTOLIC BLOOD PRESSURE: 74 MMHG | SYSTOLIC BLOOD PRESSURE: 128 MMHG | HEIGHT: 59 IN

## 2024-12-18 DIAGNOSIS — E55.9 VITAMIN D DEFICIENCY: ICD-10-CM

## 2024-12-18 DIAGNOSIS — F32.A ANXIETY AND DEPRESSION: ICD-10-CM

## 2024-12-18 DIAGNOSIS — Z12.11 SCREEN FOR COLON CANCER: ICD-10-CM

## 2024-12-18 DIAGNOSIS — F41.9 ANXIETY AND DEPRESSION: ICD-10-CM

## 2024-12-18 DIAGNOSIS — Z23 NEED FOR INFLUENZA VACCINATION: ICD-10-CM

## 2024-12-18 DIAGNOSIS — E78.2 MIXED HYPERLIPIDEMIA: ICD-10-CM

## 2024-12-18 DIAGNOSIS — F43.10 PTSD (POST-TRAUMATIC STRESS DISORDER): ICD-10-CM

## 2024-12-18 DIAGNOSIS — M54.50 LUMBAR PAIN: ICD-10-CM

## 2024-12-18 DIAGNOSIS — E10.9 TYPE 1 DIABETES MELLITUS WITHOUT COMPLICATION (HCC): Primary | ICD-10-CM

## 2024-12-18 DIAGNOSIS — A60.00 GENITAL HERPES SIMPLEX, UNSPECIFIED SITE: ICD-10-CM

## 2024-12-18 DIAGNOSIS — J30.89 SEASONAL ALLERGIC RHINITIS DUE TO OTHER ALLERGIC TRIGGER: ICD-10-CM

## 2024-12-18 DIAGNOSIS — G89.29 CHRONIC RIGHT SHOULDER PAIN: ICD-10-CM

## 2024-12-18 DIAGNOSIS — M25.511 CHRONIC RIGHT SHOULDER PAIN: ICD-10-CM

## 2024-12-18 DIAGNOSIS — I10 ESSENTIAL HYPERTENSION: ICD-10-CM

## 2024-12-18 DIAGNOSIS — Z71.85 VACCINE COUNSELING: ICD-10-CM

## 2024-12-18 RX ORDER — PRAVASTATIN SODIUM 40 MG
40 TABLET ORAL DAILY
Qty: 90 TABLET | Refills: 1 | Status: SHIPPED | OUTPATIENT
Start: 2024-12-18

## 2024-12-18 RX ORDER — AMITRIPTYLINE HYDROCHLORIDE 100 MG/1
100 TABLET ORAL NIGHTLY
Qty: 90 TABLET | Refills: 1 | Status: SHIPPED | OUTPATIENT
Start: 2024-12-18

## 2024-12-18 RX ORDER — TIZANIDINE 2 MG/1
TABLET ORAL
Qty: 90 TABLET | Refills: 1 | Status: SHIPPED | OUTPATIENT
Start: 2024-12-18

## 2024-12-18 RX ORDER — SERTRALINE HYDROCHLORIDE 100 MG/1
TABLET, FILM COATED ORAL
Qty: 90 TABLET | Refills: 1 | Status: SHIPPED | OUTPATIENT
Start: 2024-12-18

## 2024-12-18 RX ORDER — CHOLECALCIFEROL (VITAMIN D3) 50 MCG
TABLET ORAL
Qty: 180 TABLET | Refills: 1 | Status: SHIPPED | OUTPATIENT
Start: 2024-12-18

## 2024-12-18 RX ORDER — FLUTICASONE PROPIONATE 50 MCG
1 SPRAY, SUSPENSION (ML) NASAL DAILY
Qty: 1 EACH | Refills: 2 | Status: SHIPPED | OUTPATIENT
Start: 2024-12-18

## 2024-12-18 RX ORDER — ACYCLOVIR 200 MG/1
400 CAPSULE ORAL 2 TIMES DAILY
Qty: 360 CAPSULE | Refills: 1 | Status: SHIPPED | OUTPATIENT
Start: 2024-12-18

## 2024-12-18 ASSESSMENT — PATIENT HEALTH QUESTIONNAIRE - PHQ9
SUM OF ALL RESPONSES TO PHQ QUESTIONS 1-9: 0
8. MOVING OR SPEAKING SO SLOWLY THAT OTHER PEOPLE COULD HAVE NOTICED. OR THE OPPOSITE, BEING SO FIGETY OR RESTLESS THAT YOU HAVE BEEN MOVING AROUND A LOT MORE THAN USUAL: NOT AT ALL
SUM OF ALL RESPONSES TO PHQ9 QUESTIONS 1 & 2: 0
1. LITTLE INTEREST OR PLEASURE IN DOING THINGS: NOT AT ALL
4. FEELING TIRED OR HAVING LITTLE ENERGY: NOT AT ALL
7. TROUBLE CONCENTRATING ON THINGS, SUCH AS READING THE NEWSPAPER OR WATCHING TELEVISION: NOT AT ALL
SUM OF ALL RESPONSES TO PHQ QUESTIONS 1-9: 0
SUM OF ALL RESPONSES TO PHQ QUESTIONS 1-9: 0
9. THOUGHTS THAT YOU WOULD BE BETTER OFF DEAD, OR OF HURTING YOURSELF: NOT AT ALL
SUM OF ALL RESPONSES TO PHQ QUESTIONS 1-9: 0
2. FEELING DOWN, DEPRESSED OR HOPELESS: NOT AT ALL
6. FEELING BAD ABOUT YOURSELF - OR THAT YOU ARE A FAILURE OR HAVE LET YOURSELF OR YOUR FAMILY DOWN: NOT AT ALL
10. IF YOU CHECKED OFF ANY PROBLEMS, HOW DIFFICULT HAVE THESE PROBLEMS MADE IT FOR YOU TO DO YOUR WORK, TAKE CARE OF THINGS AT HOME, OR GET ALONG WITH OTHER PEOPLE: NOT DIFFICULT AT ALL
3. TROUBLE FALLING OR STAYING ASLEEP: NOT AT ALL
5. POOR APPETITE OR OVEREATING: NOT AT ALL

## 2024-12-18 ASSESSMENT — ENCOUNTER SYMPTOMS
DIARRHEA: 0
SHORTNESS OF BREATH: 0
COUGH: 0
NAUSEA: 0
CONSTIPATION: 0
ABDOMINAL PAIN: 0
VOMITING: 0
WHEEZING: 0

## 2024-12-18 NOTE — PROGRESS NOTES
time.      Coordination: Coordination normal.   Psychiatric:         Mood and Affect: Mood normal.       Assessment and Plan:  Emily was seen today for 6 month follow-up.  Diagnoses and all orders for this visit:    Type 1 diabetes mellitus without complication (HCC)  -     A1c last month 7 with endocrinology.  - Continue with endocrinology   - MICROALBUMIN / CREATININE URINE RATIO; Future    Essential hypertension   - Blood pressure stable with lifestyle modifications.  - Continue current regimen    Anxiety and depression/PTSD (post-traumatic stress disorder)  -     PHQ-9 is 0.  Denies suicidal or homicidal ideation.  - Mood well-controlled on the current regimen without side effects  - Continue current regimen  - amitriptyline (ELAVIL) 100 MG tablet; Take 1 tablet by mouth nightly  -     sertraline (ZOLOFT) 100 MG tablet; TAKE 1 TABLET BY MOUTH ONCE DAILY    Mixed hyperlipidemia  -     Taking statin without side effects.  - Continue current regimen  - pravastatin (PRAVACHOL) 40 MG tablet; Take 1 tablet by mouth daily    Genital herpes simplex, unspecified site  -     Taking medication as prescribed with rare flares.  Asymptomatic.  Denies side effects  - Continue current regimen  - acyclovir (ZOVIRAX) 200 MG capsule; Take 2 capsules by mouth 2 times daily    Vitamin D deficiency  -     Taking vitamin D supplements without side effects  - last lab stable on this regimen  - Cholecalciferol (VITAMIN D3) 50 MCG (2000 UT) TABS; TAKE 2 TABLETS BY MOUTH ONCE DAILY WITH BREAKFAST    Seasonal allergic rhinitis due to other allergic trigger  -     Well-controlled on current regimen without side effects  - fluticasone (FLONASE) 50 MCG/ACT nasal spray; 1 spray by Each Nostril route daily    Lumbar pain   - Continue with Ortho    Chronic right shoulder pain  -     Chronic.  Pt would like to continue taking this medication as needed.  - tiZANidine (ZANAFLEX) 2 MG tablet; TAKE 1 TABLET BY MOUTH NIGHTLY AS NEEDED FOR MUSCLE

## 2025-01-25 DIAGNOSIS — M18.11 OSTEOARTHRITIS OF CARPOMETACARPAL (CMC) JOINT OF RIGHT THUMB, UNSPECIFIED OSTEOARTHRITIS TYPE: ICD-10-CM

## 2025-01-25 DIAGNOSIS — M65.4 DE QUERVAIN'S TENOSYNOVITIS, LEFT: ICD-10-CM

## 2025-01-27 RX ORDER — CELECOXIB 200 MG/1
CAPSULE ORAL
Qty: 30 CAPSULE | Refills: 0 | OUTPATIENT
Start: 2025-01-27

## 2025-02-17 ENCOUNTER — OFFICE VISIT (OUTPATIENT)
Dept: PSYCHOLOGY | Age: 70
End: 2025-02-17
Payer: MEDICARE

## 2025-02-17 DIAGNOSIS — F43.10 PTSD (POST-TRAUMATIC STRESS DISORDER): Primary | ICD-10-CM

## 2025-02-17 PROCEDURE — 1123F ACP DISCUSS/DSCN MKR DOCD: CPT | Performed by: PSYCHOLOGIST

## 2025-02-17 PROCEDURE — 90832 PSYTX W PT 30 MINUTES: CPT | Performed by: PSYCHOLOGIST

## 2025-02-17 ASSESSMENT — PATIENT HEALTH QUESTIONNAIRE - PHQ9
8. MOVING OR SPEAKING SO SLOWLY THAT OTHER PEOPLE COULD HAVE NOTICED. OR THE OPPOSITE, BEING SO FIGETY OR RESTLESS THAT YOU HAVE BEEN MOVING AROUND A LOT MORE THAN USUAL: NOT AT ALL
5. POOR APPETITE OR OVEREATING: SEVERAL DAYS
SUM OF ALL RESPONSES TO PHQ QUESTIONS 1-9: 3
3. TROUBLE FALLING OR STAYING ASLEEP: SEVERAL DAYS
SUM OF ALL RESPONSES TO PHQ QUESTIONS 1-9: 3
10. IF YOU CHECKED OFF ANY PROBLEMS, HOW DIFFICULT HAVE THESE PROBLEMS MADE IT FOR YOU TO DO YOUR WORK, TAKE CARE OF THINGS AT HOME, OR GET ALONG WITH OTHER PEOPLE: NOT DIFFICULT AT ALL
6. FEELING BAD ABOUT YOURSELF - OR THAT YOU ARE A FAILURE OR HAVE LET YOURSELF OR YOUR FAMILY DOWN: NOT AT ALL
SUM OF ALL RESPONSES TO PHQ9 QUESTIONS 1 & 2: 0
4. FEELING TIRED OR HAVING LITTLE ENERGY: SEVERAL DAYS
SUM OF ALL RESPONSES TO PHQ QUESTIONS 1-9: 3
7. TROUBLE CONCENTRATING ON THINGS, SUCH AS READING THE NEWSPAPER OR WATCHING TELEVISION: NOT AT ALL
1. LITTLE INTEREST OR PLEASURE IN DOING THINGS: NOT AT ALL
9. THOUGHTS THAT YOU WOULD BE BETTER OFF DEAD, OR OF HURTING YOURSELF: NOT AT ALL
2. FEELING DOWN, DEPRESSED OR HOPELESS: NOT AT ALL
SUM OF ALL RESPONSES TO PHQ QUESTIONS 1-9: 3

## 2025-02-17 ASSESSMENT — ANXIETY QUESTIONNAIRES
IF YOU CHECKED OFF ANY PROBLEMS ON THIS QUESTIONNAIRE, HOW DIFFICULT HAVE THESE PROBLEMS MADE IT FOR YOU TO DO YOUR WORK, TAKE CARE OF THINGS AT HOME, OR GET ALONG WITH OTHER PEOPLE: NOT DIFFICULT AT ALL
GAD7 TOTAL SCORE: 2
1. FEELING NERVOUS, ANXIOUS, OR ON EDGE: NOT AT ALL
2. NOT BEING ABLE TO STOP OR CONTROL WORRYING: NOT AT ALL
6. BECOMING EASILY ANNOYED OR IRRITABLE: NOT AT ALL
3. WORRYING TOO MUCH ABOUT DIFFERENT THINGS: NOT AT ALL
7. FEELING AFRAID AS IF SOMETHING AWFUL MIGHT HAPPEN: NOT AT ALL
5. BEING SO RESTLESS THAT IT IS HARD TO SIT STILL: SEVERAL DAYS
4. TROUBLE RELAXING: SEVERAL DAYS
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
## 2025-04-22 LAB — DIABETIC RETINOPATHY: POSITIVE

## 2025-05-21 ENCOUNTER — OFFICE VISIT (OUTPATIENT)
Dept: PSYCHOLOGY | Age: 70
End: 2025-05-21
Payer: MEDICAID

## 2025-05-21 DIAGNOSIS — F43.10 PTSD (POST-TRAUMATIC STRESS DISORDER): Primary | ICD-10-CM

## 2025-05-21 PROCEDURE — 1123F ACP DISCUSS/DSCN MKR DOCD: CPT | Performed by: PSYCHOLOGIST

## 2025-05-21 PROCEDURE — 90832 PSYTX W PT 30 MINUTES: CPT | Performed by: PSYCHOLOGIST

## 2025-05-21 ASSESSMENT — PATIENT HEALTH QUESTIONNAIRE - PHQ9
SUM OF ALL RESPONSES TO PHQ QUESTIONS 1-9: 1
6. FEELING BAD ABOUT YOURSELF - OR THAT YOU ARE A FAILURE OR HAVE LET YOURSELF OR YOUR FAMILY DOWN: NOT AT ALL
9. THOUGHTS THAT YOU WOULD BE BETTER OFF DEAD, OR OF HURTING YOURSELF: NOT AT ALL
SUM OF ALL RESPONSES TO PHQ QUESTIONS 1-9: 1
3. TROUBLE FALLING OR STAYING ASLEEP: NOT AT ALL
10. IF YOU CHECKED OFF ANY PROBLEMS, HOW DIFFICULT HAVE THESE PROBLEMS MADE IT FOR YOU TO DO YOUR WORK, TAKE CARE OF THINGS AT HOME, OR GET ALONG WITH OTHER PEOPLE: NOT DIFFICULT AT ALL
7. TROUBLE CONCENTRATING ON THINGS, SUCH AS READING THE NEWSPAPER OR WATCHING TELEVISION: NOT AT ALL
4. FEELING TIRED OR HAVING LITTLE ENERGY: NOT AT ALL
8. MOVING OR SPEAKING SO SLOWLY THAT OTHER PEOPLE COULD HAVE NOTICED. OR THE OPPOSITE, BEING SO FIGETY OR RESTLESS THAT YOU HAVE BEEN MOVING AROUND A LOT MORE THAN USUAL: NOT AT ALL
2. FEELING DOWN, DEPRESSED OR HOPELESS: NOT AT ALL
SUM OF ALL RESPONSES TO PHQ QUESTIONS 1-9: 1
5. POOR APPETITE OR OVEREATING: SEVERAL DAYS
SUM OF ALL RESPONSES TO PHQ QUESTIONS 1-9: 1
1. LITTLE INTEREST OR PLEASURE IN DOING THINGS: NOT AT ALL

## 2025-05-21 NOTE — PROGRESS NOTES
Behavioral Health Consultation  Mary Hunter, Ph.D.  Psychologist  5/21/2025  3:08 PM      Time spent with Patient: 28 minutes  This is patient's ninth  [facility] appointment.    Reason for Consult:    Chief Complaint   Patient presents with    Anxiety       Feedback given to PCP.    S:  Pt seen for f/u of PTSD. Pt reported stable mood and sxs. Agreed pt has completed her tx goals and can f/u prn.     Discussed my transition to [facility] in July and discussed follow-up care options, if needed. We both agree pt has completed this episode of care and can f/u prn.     Signs of depression returning:  - Feeling unworthy and guilt  - struggling to get through your normal routine  - struggling with concentration    O:  MSE:  Appearance    alert, cooperative  Appetite normal  Sleep disturbance Yes  Fatigue Yes  Loss of pleasure No  Impulsive behavior No  Speech    spontaneous, normal rate, normal volume, and well articulated  Mood    normal  Affect    normal affect  Thought Content    intact and cognitive distortions  Thought Process    linear, goal directed, and coherent  Associations    logical connections  Insight    Fair  Judgment    Intact  Orientation    oriented to person, place, time, and general circumstances  Memory    recent and remote memory intact  Attention/Concentration    intact  Morbid ideation No  Suicide Assessment    no suicidal ideation    History:  Social History:   Social History     Socioeconomic History    Marital status: Legally      Spouse name: Not on file    Number of children: Not on file    Years of education: Not on file    Highest education level: Not on file   Occupational History    Not on file   Tobacco Use    Smoking status: Never    Smokeless tobacco: Never   Vaping Use    Vaping status: Never Used   Substance and Sexual Activity    Alcohol use: Not Currently    Drug use: Never    Sexual activity: Not Currently   Other Topics Concern    Not on file

## 2025-05-21 NOTE — PATIENT INSTRUCTIONS
Signs of depression returning:  - Feeling unworthy and guilt  - struggling to get through your normal routine  - struggling with concentration

## 2025-06-21 DIAGNOSIS — J30.89 SEASONAL ALLERGIC RHINITIS DUE TO OTHER ALLERGIC TRIGGER: ICD-10-CM

## 2025-06-23 RX ORDER — FLUTICASONE PROPIONATE 50 MCG
SPRAY, SUSPENSION (ML) NASAL
Qty: 16 G | Refills: 0 | Status: SHIPPED | OUTPATIENT
Start: 2025-06-23 | End: 2025-06-24 | Stop reason: SDUPTHER

## 2025-06-23 NOTE — TELEPHONE ENCOUNTER
Last OV: 12/18/2024  Next OV: 6/24/2025    Next appointment due:around 6/18/2025     Last fill:12/18/24  Refills:2

## 2025-06-24 ENCOUNTER — OFFICE VISIT (OUTPATIENT)
Dept: INTERNAL MEDICINE CLINIC | Age: 70
End: 2025-06-24
Payer: MEDICAID

## 2025-06-24 VITALS
HEIGHT: 59 IN | OXYGEN SATURATION: 98 % | BODY MASS INDEX: 29.88 KG/M2 | WEIGHT: 148.2 LBS | SYSTOLIC BLOOD PRESSURE: 110 MMHG | HEART RATE: 93 BPM | DIASTOLIC BLOOD PRESSURE: 60 MMHG

## 2025-06-24 DIAGNOSIS — Z12.11 SCREEN FOR COLON CANCER: ICD-10-CM

## 2025-06-24 DIAGNOSIS — J30.89 SEASONAL ALLERGIC RHINITIS DUE TO OTHER ALLERGIC TRIGGER: ICD-10-CM

## 2025-06-24 DIAGNOSIS — E10.9 TYPE 1 DIABETES MELLITUS WITHOUT COMPLICATION (HCC): ICD-10-CM

## 2025-06-24 DIAGNOSIS — A60.00 GENITAL HERPES SIMPLEX, UNSPECIFIED SITE: ICD-10-CM

## 2025-06-24 DIAGNOSIS — M54.50 LUMBAR PAIN: ICD-10-CM

## 2025-06-24 DIAGNOSIS — M25.511 CHRONIC RIGHT SHOULDER PAIN: ICD-10-CM

## 2025-06-24 DIAGNOSIS — E55.9 VITAMIN D DEFICIENCY: ICD-10-CM

## 2025-06-24 DIAGNOSIS — E78.2 MIXED HYPERLIPIDEMIA: ICD-10-CM

## 2025-06-24 DIAGNOSIS — F43.10 PTSD (POST-TRAUMATIC STRESS DISORDER): ICD-10-CM

## 2025-06-24 DIAGNOSIS — F41.9 ANXIETY AND DEPRESSION: ICD-10-CM

## 2025-06-24 DIAGNOSIS — R06.83 SNORING: ICD-10-CM

## 2025-06-24 DIAGNOSIS — G89.29 CHRONIC RIGHT SHOULDER PAIN: ICD-10-CM

## 2025-06-24 DIAGNOSIS — I10 ESSENTIAL HYPERTENSION: ICD-10-CM

## 2025-06-24 DIAGNOSIS — Z00.00 MEDICARE ANNUAL WELLNESS VISIT, SUBSEQUENT: Primary | ICD-10-CM

## 2025-06-24 DIAGNOSIS — Z12.31 ENCOUNTER FOR SCREENING MAMMOGRAM FOR MALIGNANT NEOPLASM OF BREAST: ICD-10-CM

## 2025-06-24 DIAGNOSIS — F32.A ANXIETY AND DEPRESSION: ICD-10-CM

## 2025-06-24 PROCEDURE — 99214 OFFICE O/P EST MOD 30 MIN: CPT | Performed by: NURSE PRACTITIONER

## 2025-06-24 PROCEDURE — G0439 PPPS, SUBSEQ VISIT: HCPCS | Performed by: NURSE PRACTITIONER

## 2025-06-24 PROCEDURE — 3074F SYST BP LT 130 MM HG: CPT | Performed by: NURSE PRACTITIONER

## 2025-06-24 PROCEDURE — 3078F DIAST BP <80 MM HG: CPT | Performed by: NURSE PRACTITIONER

## 2025-06-24 PROCEDURE — 1123F ACP DISCUSS/DSCN MKR DOCD: CPT | Performed by: NURSE PRACTITIONER

## 2025-06-24 RX ORDER — AMITRIPTYLINE HYDROCHLORIDE 100 MG/1
100 TABLET ORAL NIGHTLY
Qty: 90 TABLET | Refills: 1 | Status: SHIPPED | OUTPATIENT
Start: 2025-06-24

## 2025-06-24 RX ORDER — TIZANIDINE 2 MG/1
TABLET ORAL
Qty: 90 TABLET | Refills: 1 | Status: SHIPPED | OUTPATIENT
Start: 2025-06-24

## 2025-06-24 RX ORDER — ACYCLOVIR 200 MG/1
400 CAPSULE ORAL 2 TIMES DAILY
Qty: 360 CAPSULE | Refills: 1 | Status: SHIPPED | OUTPATIENT
Start: 2025-06-24

## 2025-06-24 RX ORDER — FLUTICASONE PROPIONATE 50 MCG
2 SPRAY, SUSPENSION (ML) NASAL DAILY
Qty: 16 G | Refills: 3 | Status: SHIPPED | OUTPATIENT
Start: 2025-06-24

## 2025-06-24 RX ORDER — CHOLECALCIFEROL (VITAMIN D3) 50 MCG
TABLET ORAL
Qty: 180 TABLET | Refills: 1 | Status: SHIPPED | OUTPATIENT
Start: 2025-06-24

## 2025-06-24 RX ORDER — PRAVASTATIN SODIUM 40 MG
40 TABLET ORAL DAILY
Qty: 90 TABLET | Refills: 1 | Status: SHIPPED | OUTPATIENT
Start: 2025-06-24

## 2025-06-24 RX ORDER — SERTRALINE HYDROCHLORIDE 100 MG/1
TABLET, FILM COATED ORAL
Qty: 90 TABLET | Refills: 1 | Status: SHIPPED | OUTPATIENT
Start: 2025-06-24

## 2025-06-24 SDOH — ECONOMIC STABILITY: FOOD INSECURITY: WITHIN THE PAST 12 MONTHS, YOU WORRIED THAT YOUR FOOD WOULD RUN OUT BEFORE YOU GOT MONEY TO BUY MORE.: NEVER TRUE

## 2025-06-24 SDOH — ECONOMIC STABILITY: FOOD INSECURITY: WITHIN THE PAST 12 MONTHS, THE FOOD YOU BOUGHT JUST DIDN'T LAST AND YOU DIDN'T HAVE MONEY TO GET MORE.: NEVER TRUE

## 2025-06-24 ASSESSMENT — PATIENT HEALTH QUESTIONNAIRE - PHQ9
3. TROUBLE FALLING OR STAYING ASLEEP: SEVERAL DAYS
SUM OF ALL RESPONSES TO PHQ QUESTIONS 1-9: 3
6. FEELING BAD ABOUT YOURSELF - OR THAT YOU ARE A FAILURE OR HAVE LET YOURSELF OR YOUR FAMILY DOWN: NOT AT ALL
9. THOUGHTS THAT YOU WOULD BE BETTER OFF DEAD, OR OF HURTING YOURSELF: NOT AT ALL
SUM OF ALL RESPONSES TO PHQ QUESTIONS 1-9: 3
7. TROUBLE CONCENTRATING ON THINGS, SUCH AS READING THE NEWSPAPER OR WATCHING TELEVISION: NOT AT ALL
2. FEELING DOWN, DEPRESSED OR HOPELESS: SEVERAL DAYS
SUM OF ALL RESPONSES TO PHQ QUESTIONS 1-9: 3
SUM OF ALL RESPONSES TO PHQ QUESTIONS 1-9: 3
8. MOVING OR SPEAKING SO SLOWLY THAT OTHER PEOPLE COULD HAVE NOTICED. OR THE OPPOSITE, BEING SO FIGETY OR RESTLESS THAT YOU HAVE BEEN MOVING AROUND A LOT MORE THAN USUAL: NOT AT ALL
10. IF YOU CHECKED OFF ANY PROBLEMS, HOW DIFFICULT HAVE THESE PROBLEMS MADE IT FOR YOU TO DO YOUR WORK, TAKE CARE OF THINGS AT HOME, OR GET ALONG WITH OTHER PEOPLE: NOT DIFFICULT AT ALL
4. FEELING TIRED OR HAVING LITTLE ENERGY: NOT AT ALL
1. LITTLE INTEREST OR PLEASURE IN DOING THINGS: NOT AT ALL
5. POOR APPETITE OR OVEREATING: SEVERAL DAYS

## 2025-06-24 ASSESSMENT — ENCOUNTER SYMPTOMS
DIARRHEA: 0
SHORTNESS OF BREATH: 0
CONSTIPATION: 0
NAUSEA: 0
VOMITING: 0
WHEEZING: 0
COUGH: 0
ABDOMINAL PAIN: 0
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
## 2025-06-24 NOTE — PROGRESS NOTES
Medicare Annual Wellness Visit    Emily Goodson is here for Medicare AWV    Assessment & Plan   Type 1 diabetes mellitus without complication (HCC)  -     Albumin/Creatinine Ratio, Urine; Future  Essential hypertension  PTSD (post-traumatic stress disorder)  -     sertraline (ZOLOFT) 100 MG tablet; TAKE 1 TABLET BY MOUTH ONCE DAILY, Disp-90 tablet, R-1Normal  -     amitriptyline (ELAVIL) 100 MG tablet; Take 1 tablet by mouth nightly, Disp-90 tablet, R-1Normal  Anxiety and depression  Mixed hyperlipidemia  -     pravastatin (PRAVACHOL) 40 MG tablet; Take 1 tablet by mouth daily, Disp-90 tablet, R-1Normal  Genital herpes simplex, unspecified site  -     acyclovir (ZOVIRAX) 200 MG capsule; Take 2 capsules by mouth 2 times daily, Disp-360 capsule, R-1Normal  Vitamin D deficiency  -     Cholecalciferol (VITAMIN D3) 50 MCG (2000 UT) TABS; TAKE 2 TABLETS BY MOUTH ONCE DAILY WITH BREAKFAST, Disp-180 tablet, R-1Normal  Seasonal allergic rhinitis due to other allergic trigger  -     fluticasone (FLONASE) 50 MCG/ACT nasal spray; 2 sprays by Nasal route daily, Disp-16 g, R-3Normal  Lumbar pain  Chronic right shoulder pain  -     tiZANidine (ZANAFLEX) 2 MG tablet; TAKE 1 TABLET BY MOUTH NIGHTLY AS NEEDED FOR MUSCLE SPASM, Disp-90 tablet, R-1Normal  Encounter for screening mammogram for malignant neoplasm of breast  -     [facility] RAIN DIGITAL SCREEN BILATERAL; Future  Screen for colon cancer  Medicare annual wellness visit, subsequent  Snoring  -     Ambulatory referral to Sleep Medicine       Return in about 6 months (around 12/24/2025) for routine f/u, or sooner if needed.     Subjective   See other OV note dated 06/24/25    Patient's complete Health Risk Assessment and screening values have been reviewed and are found in Flowsheets. The following problems were reviewed today and where indicated follow up appointments were made and/or referrals ordered.    Positive Risk Factor Screenings with Interventions:               Poor

## 2025-06-24 NOTE — PATIENT INSTRUCTIONS
Call to schedule with sleep medicine  Referral Details       Referred By   Referred To    Chelsey Barnes APRN - CNP    Diagnoses: Snoring   Order: Ambulatory Referral To Sleep Medicine   Reason: Specialty Services Required    [facility]   [address]   [address]   [place] [address]   Phone: [phone]   Fax: [phone]                  Eating Healthy Foods: Care Instructions  With every meal, you can make healthy food choices. Try to eat a variety of fruits, vegetables, whole grains, lean proteins, and low-fat dairy products. This can help you get the right balance of nutrients, including vitamins and minerals. Small changes add up over time. You can start by adding one healthy food to your meals each day.    Try to make half your plate fruits and vegetables, one-fourth whole grains, and one-fourth lean proteins. Try including dairy with your meals.   Eat more fruits and vegetables. Try to have them with most meals and snacks.   Foods for healthy eating        Fruits   These can be fresh, frozen, canned, or dried.  Try to choose whole fruit rather than fruit juice.  Eat a variety of colors.        Vegetables   These can be fresh, frozen, canned, or dried.  Beans, peas, and lentils count too.        Whole grains   Choose whole-grain breads, cereals, and noodles.  Try brown rice.        Lean proteins   These can include lean meat, poultry, fish, and eggs.  You can also have tofu, beans, peas, lentils, nuts, and seeds.        Dairy   Try milk, yogurt, and cheese.  Choose low-fat or fat-free when you can.  If you need to, use lactose-free milk or fortified plant-based milk products, such as soy milk.        Water   Drink water when you're thirsty.  Limit sugar-sweetened drinks, including soda, fruit drinks, and sports drinks.  Where can you learn more?  Go to https://www.healthwise.net/patientEd and enter T793 to learn more about \"Eating Healthy Foods: Care Instructions.\"  Current as of:

## 2025-06-25 ENCOUNTER — RESULTS FOLLOW-UP (OUTPATIENT)
Dept: INTERNAL MEDICINE CLINIC | Age: 70
End: 2025-06-25

## 2025-06-25 DIAGNOSIS — R92.8 ABNORMAL MAMMOGRAM: Primary | ICD-10-CM

## 2025-06-25 LAB
CREAT UR-MCNC: 62.4 MG/DL (ref 28–259)
MICROALBUMIN UR DL<=1MG/L-MCNC: <1.2 MG/DL
MICROALBUMIN/CREAT UR: NORMAL MG/G (ref 0–30)

## 2025-06-26 ENCOUNTER — TELEPHONE (OUTPATIENT)
Dept: INTERNAL MEDICINE CLINIC | Age: 70
End: 2025-06-26

## 2025-06-26 NOTE — TELEPHONE ENCOUNTER
Pt calling, asking to see Dr. Huntre before her last day. No appts available. Please advise and call pt.

## 2025-07-02 ENCOUNTER — HOSPITAL ENCOUNTER (OUTPATIENT)
Dept: WOMENS IMAGING | Age: 70
Discharge: HOME | End: 2025-07-02
Payer: MEDICAID

## 2025-07-02 ENCOUNTER — TELEPHONE (OUTPATIENT)
Dept: WOMENS IMAGING | Age: 70
End: 2025-07-02

## 2025-07-02 VITALS — HEIGHT: 59 IN | BODY MASS INDEX: 30.45 KG/M2

## 2025-07-02 DIAGNOSIS — Z12.31 ENCOUNTER FOR SCREENING MAMMOGRAM FOR MALIGNANT NEOPLASM OF BREAST: ICD-10-CM

## 2025-07-02 PROCEDURE — 77063 BREAST TOMOSYNTHESIS BI: CPT

## 2025-07-07 ENCOUNTER — TELEPHONE (OUTPATIENT)
Dept: WOMENS IMAGING | Age: 70
End: 2025-07-07

## 2025-07-07 NOTE — TELEPHONE ENCOUNTER
NN LMTCB regarding screening mammogram results and follow-up imaging recommendations.  This is our second attempt to reach patient.

## 2025-08-20 ENCOUNTER — HOSPITAL ENCOUNTER (OUTPATIENT)
Dept: WOMENS IMAGING | Age: 70
Discharge: HOME | End: 2025-08-20
Payer: MEDICARE

## 2025-08-20 ENCOUNTER — HOSPITAL ENCOUNTER (OUTPATIENT)
Dept: ULTRASOUND IMAGING | Age: 70
Discharge: HOME | End: 2025-08-20
Payer: MEDICARE

## 2025-08-20 ENCOUNTER — PRE-PROCEDURE TELEPHONE (OUTPATIENT)
Dept: WOMENS IMAGING | Age: 70
End: 2025-08-20

## 2025-08-20 DIAGNOSIS — R92.8 ABNORMAL MAMMOGRAM: ICD-10-CM

## 2025-08-20 PROCEDURE — 76642 ULTRASOUND BREAST LIMITED: CPT

## 2025-08-20 PROCEDURE — 77065 DX MAMMO INCL CAD UNI: CPT

## 2025-08-20 PROCEDURE — G0279 TOMOSYNTHESIS, MAMMO: HCPCS

## (undated) DEVICE — PADDING CAST W4INXL4YD NONSTERILE COT RAYON MICROPLEATED

## (undated) DEVICE — SOLUTION IV IRRIG 500ML 0.9% SODIUM CHL 2F7123

## (undated) DEVICE — TOWEL,OR,DSP,ST,BLUE,STD,8/PK,10PK/CS: Brand: MEDLINE

## (undated) DEVICE — GLOVE ORANGE PI 7   MSG9070

## (undated) DEVICE — PADDING CAST W4INXL4YD ST COT RAYON MICROPLEATED HIGHLY

## (undated) DEVICE — SYRINGE MED 10ML LUERLOCK TIP W/O SFTY DISP

## (undated) DEVICE — NEEDLE HYPO 18GA L1.5IN THN WALL PIVOTING SHLD BVL ORIENTED

## (undated) DEVICE — BANDAGE COMPR W4INXL5YD BGE HI E W/ REM CLP SURE-WRAP

## (undated) DEVICE — WRAP COHESIVE W2INXL5YD TAN SELF ADH BNDG HND NON STERILE TEAR CARING

## (undated) DEVICE — UNDERGLOVE SURG SZ 8 FNGR THK0.21MIL GRN LTX BEAD CUF

## (undated) DEVICE — SUTURE VCRL + SZ 3-0 L27IN ABSRB UD L26MM SH 1/2 CIR VCP416H

## (undated) DEVICE — WILLIS PACK: Brand: MEDLINE INDUSTRIES, INC.

## (undated) DEVICE — NEEDLE HYPO 25GA L1.5IN BLU POLYPR HUB S STL REG BVL STR